# Patient Record
Sex: MALE | Race: BLACK OR AFRICAN AMERICAN | NOT HISPANIC OR LATINO | Employment: OTHER | RURAL
[De-identification: names, ages, dates, MRNs, and addresses within clinical notes are randomized per-mention and may not be internally consistent; named-entity substitution may affect disease eponyms.]

---

## 2020-07-01 ENCOUNTER — HISTORICAL (OUTPATIENT)
Dept: ADMINISTRATIVE | Facility: HOSPITAL | Age: 50
End: 2020-07-01

## 2021-01-18 ENCOUNTER — HISTORICAL (OUTPATIENT)
Dept: ADMINISTRATIVE | Facility: HOSPITAL | Age: 51
End: 2021-01-18

## 2021-03-11 RX ORDER — OLMESARTAN MEDOXOMIL 20 MG/1
20 TABLET ORAL DAILY
COMMUNITY
End: 2021-03-15 | Stop reason: ALTCHOICE

## 2021-03-15 ENCOUNTER — HOSPITAL ENCOUNTER (OUTPATIENT)
Facility: HOSPITAL | Age: 51
Discharge: HOME | End: 2021-03-15
Attending: SURGERY | Admitting: SURGERY
Payer: COMMERCIAL

## 2021-03-15 ENCOUNTER — ANESTHESIA EVENT (OUTPATIENT)
Dept: SURGERY | Facility: HOSPITAL | Age: 51
End: 2021-03-15
Payer: COMMERCIAL

## 2021-03-15 ENCOUNTER — ANESTHESIA (OUTPATIENT)
Dept: SURGERY | Facility: HOSPITAL | Age: 51
End: 2021-03-15
Payer: COMMERCIAL

## 2021-03-15 VITALS
RESPIRATION RATE: 16 BRPM | TEMPERATURE: 97 F | HEIGHT: 73 IN | BODY MASS INDEX: 27.57 KG/M2 | WEIGHT: 208 LBS | SYSTOLIC BLOOD PRESSURE: 147 MMHG | HEART RATE: 50 BPM | DIASTOLIC BLOOD PRESSURE: 88 MMHG | OXYGEN SATURATION: 96 %

## 2021-03-15 PROBLEM — R10.32 GROIN DISCOMFORT, LEFT: Status: ACTIVE | Noted: 2021-03-15

## 2021-03-15 PROBLEM — K40.20 NON-RECURRENT BILATERAL INGUINAL HERNIA WITHOUT OBSTRUCTION OR GANGRENE: Status: RESOLVED | Noted: 2021-03-15 | Resolved: 2021-03-15

## 2021-03-15 PROBLEM — K40.20 NON-RECURRENT BILATERAL INGUINAL HERNIA WITHOUT OBSTRUCTION OR GANGRENE: Status: ACTIVE | Noted: 2021-03-15

## 2021-03-15 PROCEDURE — 63600175 PHARM REV CODE 636 W HCPCS: Performed by: ANESTHESIOLOGY

## 2021-03-15 PROCEDURE — 25000003 PHARM REV CODE 250: Performed by: ANESTHESIOLOGY

## 2021-03-15 PROCEDURE — D9220A PRA ANESTHESIA: ICD-10-PCS | Mod: ,,, | Performed by: ANESTHESIOLOGY

## 2021-03-15 PROCEDURE — 63600175 PHARM REV CODE 636 W HCPCS: Performed by: SURGERY

## 2021-03-15 PROCEDURE — 36000711: Performed by: SURGERY

## 2021-03-15 PROCEDURE — C1781 MESH (IMPLANTABLE): HCPCS | Performed by: SURGERY

## 2021-03-15 PROCEDURE — 25000003 PHARM REV CODE 250: Performed by: SURGERY

## 2021-03-15 PROCEDURE — D9220A PRA ANESTHESIA: Mod: ,,, | Performed by: ANESTHESIOLOGY

## 2021-03-15 PROCEDURE — 49650 PR LAP,INGUINAL HERNIA REPR,INITIAL: ICD-10-PCS | Mod: 50,,, | Performed by: SURGERY

## 2021-03-15 PROCEDURE — 71000033 HC RECOVERY, INTIAL HOUR: Performed by: SURGERY

## 2021-03-15 PROCEDURE — 71000015 HC POSTOP RECOV 1ST HR: Performed by: SURGERY

## 2021-03-15 PROCEDURE — 37000009 HC ANESTHESIA EA ADD 15 MINS: Performed by: SURGERY

## 2021-03-15 PROCEDURE — 36000710: Performed by: SURGERY

## 2021-03-15 PROCEDURE — 49650 LAP ING HERNIA REPAIR INIT: CPT | Mod: 50,,, | Performed by: SURGERY

## 2021-03-15 PROCEDURE — 27201423 OPTIME MED/SURG SUP & DEVICES STERILE SUPPLY: Performed by: SURGERY

## 2021-03-15 PROCEDURE — 63600175 PHARM REV CODE 636 W HCPCS

## 2021-03-15 PROCEDURE — 71000016 HC POSTOP RECOV ADDL HR: Performed by: SURGERY

## 2021-03-15 PROCEDURE — 37000008 HC ANESTHESIA 1ST 15 MINUTES: Performed by: SURGERY

## 2021-03-15 DEVICE — PROGRIP LAPAROSCOPIC SELF FIXATING MESH 10 X15: Type: IMPLANTABLE DEVICE | Site: INGUINAL | Status: FUNCTIONAL

## 2021-03-15 RX ORDER — MEPERIDINE HYDROCHLORIDE 25 MG/ML
25 INJECTION INTRAMUSCULAR; INTRAVENOUS; SUBCUTANEOUS EVERY 10 MIN PRN
Status: DISCONTINUED | OUTPATIENT
Start: 2021-03-15 | End: 2021-03-15 | Stop reason: HOSPADM

## 2021-03-15 RX ORDER — MEPERIDINE HYDROCHLORIDE 25 MG/ML
INJECTION INTRAMUSCULAR; INTRAVENOUS; SUBCUTANEOUS
Status: DISCONTINUED
Start: 2021-03-15 | End: 2021-03-15 | Stop reason: HOSPADM

## 2021-03-15 RX ORDER — CEFAZOLIN SODIUM 2 G/50ML
2 SOLUTION INTRAVENOUS
Status: COMPLETED | OUTPATIENT
Start: 2021-03-15 | End: 2021-03-15

## 2021-03-15 RX ORDER — SODIUM CHLORIDE, SODIUM LACTATE, POTASSIUM CHLORIDE, CALCIUM CHLORIDE 600; 310; 30; 20 MG/100ML; MG/100ML; MG/100ML; MG/100ML
INJECTION, SOLUTION INTRAVENOUS
Status: DISCONTINUED
Start: 2021-03-15 | End: 2021-03-15 | Stop reason: HOSPADM

## 2021-03-15 RX ORDER — HYDROMORPHONE HYDROCHLORIDE 2 MG/ML
INJECTION, SOLUTION INTRAMUSCULAR; INTRAVENOUS; SUBCUTANEOUS
Status: DISCONTINUED
Start: 2021-03-15 | End: 2021-03-15 | Stop reason: HOSPADM

## 2021-03-15 RX ORDER — COLCHICINE 0.6 MG/1
0.6 TABLET ORAL
COMMUNITY
End: 2022-10-25 | Stop reason: SDUPTHER

## 2021-03-15 RX ORDER — HYDROMORPHONE HYDROCHLORIDE 2 MG/ML
0.5 INJECTION, SOLUTION INTRAMUSCULAR; INTRAVENOUS; SUBCUTANEOUS EVERY 5 MIN PRN
Status: DISCONTINUED | OUTPATIENT
Start: 2021-03-15 | End: 2021-03-15 | Stop reason: HOSPADM

## 2021-03-15 RX ORDER — ASPIRIN 81 MG/1
81 TABLET ORAL DAILY
COMMUNITY
End: 2022-02-03

## 2021-03-15 RX ORDER — HYDROCODONE BITARTRATE AND ACETAMINOPHEN 7.5; 325 MG/1; MG/1
1 TABLET ORAL EVERY 4 HOURS PRN
Qty: 20 TABLET | Refills: 0 | OUTPATIENT
Start: 2021-03-15

## 2021-03-15 RX ORDER — NEOSTIGMINE METHYLSULFATE 0.5 MG/ML
INJECTION, SOLUTION INTRAVENOUS
Status: DISCONTINUED | OUTPATIENT
Start: 2021-03-15 | End: 2021-03-15

## 2021-03-15 RX ORDER — MIDAZOLAM HYDROCHLORIDE 1 MG/ML
INJECTION, SOLUTION INTRAMUSCULAR; INTRAVENOUS
Status: DISCONTINUED | OUTPATIENT
Start: 2021-03-15 | End: 2021-03-15

## 2021-03-15 RX ORDER — SODIUM CHLORIDE, SODIUM LACTATE, POTASSIUM CHLORIDE, CALCIUM CHLORIDE 600; 310; 30; 20 MG/100ML; MG/100ML; MG/100ML; MG/100ML
INJECTION, SOLUTION INTRAVENOUS
Status: COMPLETED
Start: 2021-03-15 | End: 2021-03-15

## 2021-03-15 RX ORDER — FENTANYL CITRATE 50 UG/ML
INJECTION, SOLUTION INTRAMUSCULAR; INTRAVENOUS
Status: DISCONTINUED | OUTPATIENT
Start: 2021-03-15 | End: 2021-03-15

## 2021-03-15 RX ORDER — DEXAMETHASONE SODIUM PHOSPHATE 4 MG/ML
INJECTION, SOLUTION INTRA-ARTICULAR; INTRALESIONAL; INTRAMUSCULAR; INTRAVENOUS; SOFT TISSUE
Status: DISCONTINUED | OUTPATIENT
Start: 2021-03-15 | End: 2021-03-15

## 2021-03-15 RX ORDER — ALLOPURINOL 300 MG/1
300 TABLET ORAL
COMMUNITY
End: 2021-06-24

## 2021-03-15 RX ORDER — SODIUM CHLORIDE, SODIUM LACTATE, POTASSIUM CHLORIDE, CALCIUM CHLORIDE 600; 310; 30; 20 MG/100ML; MG/100ML; MG/100ML; MG/100ML
INJECTION, SOLUTION INTRAVENOUS CONTINUOUS
Status: DISCONTINUED | OUTPATIENT
Start: 2021-03-15 | End: 2021-03-15 | Stop reason: HOSPADM

## 2021-03-15 RX ORDER — ONDANSETRON 2 MG/ML
4 INJECTION INTRAMUSCULAR; INTRAVENOUS DAILY PRN
Status: DISCONTINUED | OUTPATIENT
Start: 2021-03-15 | End: 2021-03-15 | Stop reason: HOSPADM

## 2021-03-15 RX ORDER — LIDOCAINE HYDROCHLORIDE 10 MG/ML
1 INJECTION INFILTRATION; PERINEURAL ONCE
Status: DISCONTINUED | OUTPATIENT
Start: 2021-03-15 | End: 2021-03-15 | Stop reason: HOSPADM

## 2021-03-15 RX ORDER — LIDOCAINE HYDROCHLORIDE 20 MG/ML
INJECTION INTRAVENOUS
Status: DISCONTINUED | OUTPATIENT
Start: 2021-03-15 | End: 2021-03-15

## 2021-03-15 RX ORDER — ROCURONIUM BROMIDE 10 MG/ML
INJECTION, SOLUTION INTRAVENOUS
Status: DISCONTINUED | OUTPATIENT
Start: 2021-03-15 | End: 2021-03-15

## 2021-03-15 RX ORDER — BUPIVACAINE HYDROCHLORIDE 2.5 MG/ML
INJECTION, SOLUTION EPIDURAL; INFILTRATION; INTRACAUDAL
Status: DISCONTINUED | OUTPATIENT
Start: 2021-03-15 | End: 2021-03-15 | Stop reason: HOSPADM

## 2021-03-15 RX ORDER — NIFEDIPINE 60 MG/1
60 TABLET, EXTENDED RELEASE ORAL DAILY
COMMUNITY
End: 2022-02-03

## 2021-03-15 RX ORDER — PROPOFOL 10 MG/ML
VIAL (ML) INTRAVENOUS
Status: DISCONTINUED | OUTPATIENT
Start: 2021-03-15 | End: 2021-03-15

## 2021-03-15 RX ORDER — ONDANSETRON 2 MG/ML
INJECTION INTRAMUSCULAR; INTRAVENOUS
Status: DISCONTINUED | OUTPATIENT
Start: 2021-03-15 | End: 2021-03-15

## 2021-03-15 RX ORDER — DIPHENHYDRAMINE HYDROCHLORIDE 50 MG/ML
25 INJECTION INTRAMUSCULAR; INTRAVENOUS EVERY 6 HOURS PRN
Status: DISCONTINUED | OUTPATIENT
Start: 2021-03-15 | End: 2021-03-15 | Stop reason: HOSPADM

## 2021-03-15 RX ADMIN — CEFAZOLIN 2000 MG: 1 INJECTION, POWDER, FOR SOLUTION INTRAVENOUS at 10:03

## 2021-03-15 RX ADMIN — SODIUM CHLORIDE, POTASSIUM CHLORIDE, SODIUM LACTATE AND CALCIUM CHLORIDE: 600; 310; 30; 20 INJECTION, SOLUTION INTRAVENOUS at 12:03

## 2021-03-15 RX ADMIN — LIDOCAINE HYDROCHLORIDE 100 MG: 20 INJECTION, SOLUTION INTRAVENOUS at 10:03

## 2021-03-15 RX ADMIN — SODIUM CHLORIDE, POTASSIUM CHLORIDE, SODIUM LACTATE AND CALCIUM CHLORIDE: 600; 310; 30; 20 INJECTION, SOLUTION INTRAVENOUS at 10:03

## 2021-03-15 RX ADMIN — MIDAZOLAM HYDROCHLORIDE 2 MG: 1 INJECTION, SOLUTION INTRAMUSCULAR; INTRAVENOUS at 10:03

## 2021-03-15 RX ADMIN — HYDROMORPHONE HYDROCHLORIDE 0.5 MG: 2 INJECTION, SOLUTION INTRAMUSCULAR; INTRAVENOUS; SUBCUTANEOUS at 12:03

## 2021-03-15 RX ADMIN — DEXAMETHASONE SODIUM PHOSPHATE 4 MG: 4 INJECTION, SOLUTION INTRA-ARTICULAR; INTRALESIONAL; INTRAMUSCULAR; INTRAVENOUS; SOFT TISSUE at 10:03

## 2021-03-15 RX ADMIN — ONDANSETRON 4 MG: 2 INJECTION INTRAMUSCULAR; INTRAVENOUS at 10:03

## 2021-03-15 RX ADMIN — NEOSTIGMINE METHYLSULFATE 2 MG: 0.5 INJECTION, SOLUTION INTRAVENOUS at 11:03

## 2021-03-15 RX ADMIN — ROCURONIUM BROMIDE 35 MG: 10 INJECTION, SOLUTION INTRAVENOUS at 10:03

## 2021-03-15 RX ADMIN — FENTANYL CITRATE 100 MCG: 50 INJECTION, SOLUTION INTRAMUSCULAR; INTRAVENOUS at 10:03

## 2021-03-15 RX ADMIN — Medication 200 MG: at 10:03

## 2021-03-29 ENCOUNTER — OFFICE VISIT (OUTPATIENT)
Dept: SURGERY | Facility: CLINIC | Age: 51
End: 2021-03-29
Payer: COMMERCIAL

## 2021-03-29 DIAGNOSIS — K40.20 NON-RECURRENT BILATERAL INGUINAL HERNIA WITHOUT OBSTRUCTION OR GANGRENE: Primary | ICD-10-CM

## 2021-03-29 PROCEDURE — 99213 OFFICE O/P EST LOW 20 MIN: CPT | Mod: PBBFAC | Performed by: SURGERY

## 2021-03-29 PROCEDURE — 99024 POSTOP FOLLOW-UP VISIT: CPT | Mod: ,,, | Performed by: SURGERY

## 2021-03-29 PROCEDURE — 99999 PR PBB SHADOW E&M-EST. PATIENT-LVL III: ICD-10-PCS | Mod: PBBFAC,,, | Performed by: SURGERY

## 2021-03-29 PROCEDURE — 99024 PR POST-OP FOLLOW-UP VISIT: ICD-10-PCS | Mod: ,,, | Performed by: SURGERY

## 2021-03-29 PROCEDURE — 99999 PR PBB SHADOW E&M-EST. PATIENT-LVL III: CPT | Mod: PBBFAC,,, | Performed by: SURGERY

## 2021-04-20 ENCOUNTER — OFFICE VISIT (OUTPATIENT)
Dept: ORTHOPEDICS | Facility: CLINIC | Age: 51
End: 2021-04-20
Payer: COMMERCIAL

## 2021-04-20 DIAGNOSIS — M25.559 PAIN IN UNSPECIFIED HIP: ICD-10-CM

## 2021-04-20 DIAGNOSIS — M25.852 FEMOROACETABULAR IMPINGEMENT OF LEFT HIP: Primary | ICD-10-CM

## 2021-04-20 PROCEDURE — 99203 PR OFFICE/OUTPT VISIT, NEW, LEVL III, 30-44 MIN: ICD-10-PCS | Mod: ,,, | Performed by: ORTHOPAEDIC SURGERY

## 2021-04-20 PROCEDURE — 99203 OFFICE O/P NEW LOW 30 MIN: CPT | Mod: ,,, | Performed by: ORTHOPAEDIC SURGERY

## 2021-05-05 DIAGNOSIS — M25.552 LEFT HIP PAIN: Primary | ICD-10-CM

## 2021-05-07 ENCOUNTER — HISTORICAL (OUTPATIENT)
Dept: ADMINISTRATIVE | Facility: HOSPITAL | Age: 51
End: 2021-05-07

## 2021-05-17 DIAGNOSIS — M25.552 LEFT HIP PAIN: Primary | ICD-10-CM

## 2021-05-20 ENCOUNTER — TELEPHONE (OUTPATIENT)
Dept: FAMILY MEDICINE | Facility: CLINIC | Age: 51
End: 2021-05-20

## 2021-05-20 DIAGNOSIS — Z12.11 ENCOUNTER FOR SCREENING FOR MALIGNANT NEOPLASM OF COLON: Primary | ICD-10-CM

## 2021-05-20 RX ORDER — HYDROCODONE BITARTRATE AND ACETAMINOPHEN 7.5; 325 MG/1; MG/1
1 TABLET ORAL EVERY 4 HOURS PRN
COMMUNITY
Start: 2021-03-15 | End: 2021-10-01

## 2021-05-20 RX ORDER — AMLODIPINE BESYLATE 5 MG/1
5 TABLET ORAL DAILY
COMMUNITY
Start: 2020-12-31 | End: 2021-06-24

## 2021-05-20 RX ORDER — CETIRIZINE HYDROCHLORIDE 10 MG/1
10 TABLET ORAL DAILY
COMMUNITY
Start: 2021-05-08 | End: 2021-11-02 | Stop reason: SDUPTHER

## 2021-05-20 RX ORDER — ATENOLOL AND CHLORTHALIDONE TABLET 50; 25 MG/1; MG/1
1 TABLET ORAL 2 TIMES DAILY
COMMUNITY
Start: 2020-12-30 | End: 2022-02-03

## 2021-05-20 RX ORDER — DILTIAZEM HYDROCHLORIDE 240 MG/1
240 CAPSULE, EXTENDED RELEASE ORAL DAILY
COMMUNITY
Start: 2020-12-21

## 2021-05-20 RX ORDER — NIFEDIPINE 30 MG/1
30 TABLET, FILM COATED, EXTENDED RELEASE ORAL NIGHTLY
COMMUNITY
Start: 2021-02-04 | End: 2022-02-03 | Stop reason: DRUGHIGH

## 2021-05-21 ENCOUNTER — HOSPITAL ENCOUNTER (OUTPATIENT)
Dept: RADIOLOGY | Facility: HOSPITAL | Age: 51
Discharge: HOME | End: 2021-05-21
Attending: ORTHOPAEDIC SURGERY
Payer: COMMERCIAL

## 2021-05-21 DIAGNOSIS — M25.552 LEFT HIP PAIN: ICD-10-CM

## 2021-05-21 PROCEDURE — 63600175 PHARM REV CODE 636 W HCPCS

## 2021-05-21 PROCEDURE — 20610 DRAIN/INJ JOINT/BURSA W/O US: CPT | Mod: TC,LT

## 2021-05-21 PROCEDURE — 25000003 PHARM REV CODE 250: Performed by: ORTHOPAEDIC SURGERY

## 2021-05-21 PROCEDURE — 25500020 PHARM REV CODE 255

## 2021-05-21 RX ORDER — BUPIVACAINE HYDROCHLORIDE 2.5 MG/ML
5 INJECTION, SOLUTION EPIDURAL; INFILTRATION; INTRACAUDAL ONCE
Status: COMPLETED | OUTPATIENT
Start: 2021-05-21 | End: 2021-05-21

## 2021-05-21 RX ADMIN — IOPAMIDOL 4 ML: 612 INJECTION, SOLUTION INTRAVENOUS at 10:05

## 2021-05-21 RX ADMIN — BUPIVACAINE HYDROCHLORIDE 12.5 MG: 2.5 INJECTION, SOLUTION EPIDURAL; INFILTRATION; INTRACAUDAL; PERINEURAL at 11:05

## 2021-06-17 ENCOUNTER — OFFICE VISIT (OUTPATIENT)
Dept: ORTHOPEDICS | Facility: CLINIC | Age: 51
End: 2021-06-17
Payer: COMMERCIAL

## 2021-06-17 DIAGNOSIS — G57.12 MERALGIA PARAESTHETICA, LEFT: ICD-10-CM

## 2021-06-17 DIAGNOSIS — M25.852 FEMOROACETABULAR IMPINGEMENT OF LEFT HIP: Primary | ICD-10-CM

## 2021-06-17 PROCEDURE — 99213 OFFICE O/P EST LOW 20 MIN: CPT | Mod: ,,, | Performed by: ORTHOPAEDIC SURGERY

## 2021-06-17 PROCEDURE — 99213 PR OFFICE/OUTPT VISIT, EST, LEVL III, 20-29 MIN: ICD-10-PCS | Mod: ,,, | Performed by: ORTHOPAEDIC SURGERY

## 2021-06-17 RX ORDER — GABAPENTIN 100 MG/1
100 CAPSULE ORAL 3 TIMES DAILY
Qty: 90 CAPSULE | Refills: 11 | Status: SHIPPED | OUTPATIENT
Start: 2021-06-17 | End: 2023-07-06 | Stop reason: SDUPTHER

## 2021-06-22 ENCOUNTER — CLINICAL SUPPORT (OUTPATIENT)
Dept: REHABILITATION | Facility: HOSPITAL | Age: 51
End: 2021-06-22
Payer: COMMERCIAL

## 2021-06-22 DIAGNOSIS — M25.852 FEMOROACETABULAR IMPINGEMENT OF LEFT HIP: ICD-10-CM

## 2021-06-22 PROCEDURE — 97161 PT EVAL LOW COMPLEX 20 MIN: CPT | Mod: PN

## 2021-06-22 PROCEDURE — 97110 THERAPEUTIC EXERCISES: CPT | Mod: PN

## 2021-06-24 ENCOUNTER — CLINICAL SUPPORT (OUTPATIENT)
Dept: REHABILITATION | Facility: HOSPITAL | Age: 51
End: 2021-06-24
Payer: COMMERCIAL

## 2021-06-24 ENCOUNTER — ANESTHESIA EVENT (OUTPATIENT)
Dept: GASTROENTEROLOGY | Facility: HOSPITAL | Age: 51
End: 2021-06-24
Payer: COMMERCIAL

## 2021-06-24 ENCOUNTER — ANESTHESIA (OUTPATIENT)
Dept: GASTROENTEROLOGY | Facility: HOSPITAL | Age: 51
End: 2021-06-24
Payer: COMMERCIAL

## 2021-06-24 ENCOUNTER — HOSPITAL ENCOUNTER (OUTPATIENT)
Dept: GASTROENTEROLOGY | Facility: HOSPITAL | Age: 51
Discharge: HOME | End: 2021-06-24
Attending: STUDENT IN AN ORGANIZED HEALTH CARE EDUCATION/TRAINING PROGRAM
Payer: COMMERCIAL

## 2021-06-24 VITALS
RESPIRATION RATE: 12 BRPM | DIASTOLIC BLOOD PRESSURE: 76 MMHG | HEART RATE: 70 BPM | OXYGEN SATURATION: 98 % | TEMPERATURE: 97 F | BODY MASS INDEX: 27.57 KG/M2 | WEIGHT: 208 LBS | HEIGHT: 73 IN | SYSTOLIC BLOOD PRESSURE: 113 MMHG

## 2021-06-24 DIAGNOSIS — M25.852 HIP IMPINGEMENT SYNDROME, LEFT: Primary | ICD-10-CM

## 2021-06-24 DIAGNOSIS — Z12.11 SCREENING FOR MALIGNANT NEOPLASM OF COLON: ICD-10-CM

## 2021-06-24 DIAGNOSIS — Z12.11 SCREENING FOR MALIGNANT NEOPLASM OF COLON: Primary | ICD-10-CM

## 2021-06-24 DIAGNOSIS — Z12.11 ENCOUNTER FOR SCREENING FOR MALIGNANT NEOPLASM OF COLON: ICD-10-CM

## 2021-06-24 PROCEDURE — D9220A PRA ANESTHESIA: ICD-10-PCS | Mod: PT,,, | Performed by: NURSE ANESTHETIST, CERTIFIED REGISTERED

## 2021-06-24 PROCEDURE — 27000284 HC CANNULA NASAL: Performed by: NURSE ANESTHETIST, CERTIFIED REGISTERED

## 2021-06-24 PROCEDURE — 27000716 HC OXISENSOR PROBE, ANY SIZE: Performed by: NURSE ANESTHETIST, CERTIFIED REGISTERED

## 2021-06-24 PROCEDURE — 37000008 HC ANESTHESIA 1ST 15 MINUTES

## 2021-06-24 PROCEDURE — C1889 IMPLANT/INSERT DEVICE, NOC: HCPCS

## 2021-06-24 PROCEDURE — 25000003 PHARM REV CODE 250: Performed by: NURSE ANESTHETIST, CERTIFIED REGISTERED

## 2021-06-24 PROCEDURE — 97035 APP MDLTY 1+ULTRASOUND EA 15: CPT | Mod: PN,CQ

## 2021-06-24 PROCEDURE — 45385 COLONOSCOPY W/LESION REMOVAL: CPT | Mod: 33,,, | Performed by: STUDENT IN AN ORGANIZED HEALTH CARE EDUCATION/TRAINING PROGRAM

## 2021-06-24 PROCEDURE — 45380 COLONOSCOPY AND BIOPSY: CPT | Mod: 59,33,, | Performed by: STUDENT IN AN ORGANIZED HEALTH CARE EDUCATION/TRAINING PROGRAM

## 2021-06-24 PROCEDURE — 45380 PR COLONOSCOPY,BIOPSY: ICD-10-PCS | Mod: 59,33,, | Performed by: STUDENT IN AN ORGANIZED HEALTH CARE EDUCATION/TRAINING PROGRAM

## 2021-06-24 PROCEDURE — 45385 COLONOSCOPY W/LESION REMOVAL: CPT | Mod: 33

## 2021-06-24 PROCEDURE — 88305 TISSUE EXAM BY PATHOLOGIST: CPT | Mod: SUR | Performed by: STUDENT IN AN ORGANIZED HEALTH CARE EDUCATION/TRAINING PROGRAM

## 2021-06-24 PROCEDURE — 97110 THERAPEUTIC EXERCISES: CPT | Mod: PN,CQ

## 2021-06-24 PROCEDURE — 63600175 PHARM REV CODE 636 W HCPCS: Performed by: NURSE ANESTHETIST, CERTIFIED REGISTERED

## 2021-06-24 PROCEDURE — 88305 SURGICAL PATHOLOGY: ICD-10-PCS | Mod: 26,,, | Performed by: PATHOLOGY

## 2021-06-24 PROCEDURE — 45380 COLONOSCOPY AND BIOPSY: CPT

## 2021-06-24 PROCEDURE — 45385 PR COLONOSCOPY,REMV LESN,SNARE: ICD-10-PCS | Mod: 33,,, | Performed by: STUDENT IN AN ORGANIZED HEALTH CARE EDUCATION/TRAINING PROGRAM

## 2021-06-24 PROCEDURE — 37000009 HC ANESTHESIA EA ADD 15 MINS

## 2021-06-24 PROCEDURE — 27201423 OPTIME MED/SURG SUP & DEVICES STERILE SUPPLY

## 2021-06-24 PROCEDURE — 88305 TISSUE EXAM BY PATHOLOGIST: CPT | Mod: 26,,, | Performed by: PATHOLOGY

## 2021-06-24 PROCEDURE — D9220A PRA ANESTHESIA: Mod: PT,,, | Performed by: NURSE ANESTHETIST, CERTIFIED REGISTERED

## 2021-06-24 RX ORDER — LIDOCAINE HYDROCHLORIDE 20 MG/ML
INJECTION, SOLUTION EPIDURAL; INFILTRATION; INTRACAUDAL; PERINEURAL
Status: DISCONTINUED | OUTPATIENT
Start: 2021-06-24 | End: 2021-06-24

## 2021-06-24 RX ORDER — SODIUM CHLORIDE 0.9 % (FLUSH) 0.9 %
10 SYRINGE (ML) INJECTION
Status: DISCONTINUED | OUTPATIENT
Start: 2021-06-24 | End: 2021-06-25 | Stop reason: HOSPADM

## 2021-06-24 RX ORDER — PROPOFOL 10 MG/ML
VIAL (ML) INTRAVENOUS
Status: DISCONTINUED | OUTPATIENT
Start: 2021-06-24 | End: 2021-06-24

## 2021-06-24 RX ORDER — SODIUM CHLORIDE 9 MG/ML
INJECTION, SOLUTION INTRAVENOUS CONTINUOUS
Status: DISCONTINUED | OUTPATIENT
Start: 2021-06-24 | End: 2021-06-25 | Stop reason: HOSPADM

## 2021-06-24 RX ADMIN — SODIUM CHLORIDE: 9 INJECTION, SOLUTION INTRAVENOUS at 01:06

## 2021-06-24 RX ADMIN — PROPOFOL 100 MG: 10 INJECTION, EMULSION INTRAVENOUS at 01:06

## 2021-06-24 RX ADMIN — PROPOFOL 40 MG: 10 INJECTION, EMULSION INTRAVENOUS at 01:06

## 2021-06-24 RX ADMIN — PROPOFOL 50 MG: 10 INJECTION, EMULSION INTRAVENOUS at 01:06

## 2021-06-24 RX ADMIN — LIDOCAINE HYDROCHLORIDE 80 MG: 20 INJECTION, SOLUTION INTRAVENOUS at 01:06

## 2021-06-25 ENCOUNTER — CLINICAL SUPPORT (OUTPATIENT)
Dept: REHABILITATION | Facility: HOSPITAL | Age: 51
End: 2021-06-25
Payer: COMMERCIAL

## 2021-06-25 DIAGNOSIS — M25.852 HIP IMPINGEMENT SYNDROME, LEFT: Primary | ICD-10-CM

## 2021-06-25 LAB
ESTROGEN SERPL-MCNC: NORMAL PG/ML
LAB AP GROSS DESCRIPTION: NORMAL
LAB AP LABORATORY NOTES: NORMAL
T3RU NFR SERPL: NORMAL %

## 2021-06-25 PROCEDURE — 97110 THERAPEUTIC EXERCISES: CPT | Mod: PN,CQ

## 2021-06-25 PROCEDURE — 97035 APP MDLTY 1+ULTRASOUND EA 15: CPT | Mod: PN,CQ

## 2021-06-29 ENCOUNTER — CLINICAL SUPPORT (OUTPATIENT)
Dept: REHABILITATION | Facility: HOSPITAL | Age: 51
End: 2021-06-29
Payer: COMMERCIAL

## 2021-06-29 ENCOUNTER — TELEPHONE (OUTPATIENT)
Dept: ORTHOPEDICS | Facility: CLINIC | Age: 51
End: 2021-06-29

## 2021-06-29 DIAGNOSIS — M25.552 LEFT HIP PAIN: Primary | ICD-10-CM

## 2021-06-29 DIAGNOSIS — S73.192D TEAR OF LEFT ACETABULAR LABRUM, SUBSEQUENT ENCOUNTER: Primary | ICD-10-CM

## 2021-06-29 PROCEDURE — 97110 THERAPEUTIC EXERCISES: CPT | Mod: PN,CQ

## 2021-06-29 PROCEDURE — 97014 ELECTRIC STIMULATION THERAPY: CPT | Mod: PN,CQ

## 2021-06-29 RX ORDER — IBUPROFEN 800 MG/1
800 TABLET ORAL EVERY 6 HOURS PRN
Qty: 30 TABLET | Refills: 0 | Status: CANCELLED | OUTPATIENT
Start: 2021-06-29

## 2021-06-30 ENCOUNTER — CLINICAL SUPPORT (OUTPATIENT)
Dept: REHABILITATION | Facility: HOSPITAL | Age: 51
End: 2021-06-30
Payer: COMMERCIAL

## 2021-06-30 DIAGNOSIS — M25.652 DECREASED RANGE OF LEFT HIP MOVEMENT: Primary | ICD-10-CM

## 2021-06-30 PROCEDURE — 97110 THERAPEUTIC EXERCISES: CPT | Mod: PN,CQ

## 2021-06-30 PROCEDURE — 97014 ELECTRIC STIMULATION THERAPY: CPT | Mod: PN,CQ

## 2021-07-02 ENCOUNTER — CLINICAL SUPPORT (OUTPATIENT)
Dept: REHABILITATION | Facility: HOSPITAL | Age: 51
End: 2021-07-02
Payer: COMMERCIAL

## 2021-07-02 PROCEDURE — 97110 THERAPEUTIC EXERCISES: CPT | Mod: PN,CQ

## 2021-07-06 ENCOUNTER — CLINICAL SUPPORT (OUTPATIENT)
Dept: REHABILITATION | Facility: HOSPITAL | Age: 51
End: 2021-07-06
Payer: COMMERCIAL

## 2021-07-06 DIAGNOSIS — S73.192S TEAR OF LEFT ACETABULAR LABRUM, SEQUELA: Primary | ICD-10-CM

## 2021-07-06 PROCEDURE — 97110 THERAPEUTIC EXERCISES: CPT | Mod: PN,CQ

## 2021-07-08 ENCOUNTER — CLINICAL SUPPORT (OUTPATIENT)
Dept: REHABILITATION | Facility: HOSPITAL | Age: 51
End: 2021-07-08
Payer: COMMERCIAL

## 2021-07-08 PROCEDURE — 97110 THERAPEUTIC EXERCISES: CPT | Mod: PN,CQ

## 2021-07-09 ENCOUNTER — CLINICAL SUPPORT (OUTPATIENT)
Dept: REHABILITATION | Facility: HOSPITAL | Age: 51
End: 2021-07-09
Payer: COMMERCIAL

## 2021-07-09 DIAGNOSIS — M25.652 DECREASED RANGE OF LEFT HIP MOVEMENT: Primary | ICD-10-CM

## 2021-07-09 PROCEDURE — 97110 THERAPEUTIC EXERCISES: CPT | Mod: PN,CQ

## 2021-07-12 ENCOUNTER — CLINICAL SUPPORT (OUTPATIENT)
Dept: REHABILITATION | Facility: HOSPITAL | Age: 51
End: 2021-07-12
Payer: COMMERCIAL

## 2021-07-12 DIAGNOSIS — M25.652 DECREASED RANGE OF LEFT HIP MOVEMENT: Primary | ICD-10-CM

## 2021-07-12 PROCEDURE — 97110 THERAPEUTIC EXERCISES: CPT | Mod: PN,CQ

## 2021-07-22 ENCOUNTER — TELEPHONE (OUTPATIENT)
Dept: FAMILY MEDICINE | Facility: CLINIC | Age: 51
End: 2021-07-22

## 2021-07-29 ENCOUNTER — OFFICE VISIT (OUTPATIENT)
Dept: ORTHOPEDICS | Facility: CLINIC | Age: 51
End: 2021-07-29
Payer: COMMERCIAL

## 2021-07-29 DIAGNOSIS — Z01.818 PREPROCEDURAL EXAMINATION: ICD-10-CM

## 2021-07-29 DIAGNOSIS — Z01.812 PRE-PROCEDURAL LABORATORY EXAMINATION: ICD-10-CM

## 2021-07-29 DIAGNOSIS — M25.852 FEMOROACETABULAR IMPINGEMENT OF LEFT HIP: Primary | ICD-10-CM

## 2021-07-29 PROCEDURE — 99214 PR OFFICE/OUTPT VISIT, EST, LEVL IV, 30-39 MIN: ICD-10-PCS | Mod: ,,, | Performed by: ORTHOPAEDIC SURGERY

## 2021-07-29 PROCEDURE — 99214 OFFICE O/P EST MOD 30 MIN: CPT | Mod: ,,, | Performed by: ORTHOPAEDIC SURGERY

## 2021-07-29 RX ORDER — NAPROXEN 500 MG/1
500 TABLET ORAL 2 TIMES DAILY WITH MEALS
Qty: 60 TABLET | Refills: 3 | Status: SHIPPED | OUTPATIENT
Start: 2021-07-29 | End: 2022-02-03 | Stop reason: ALTCHOICE

## 2021-07-30 DIAGNOSIS — M25.852 FEMOROACETABULAR IMPINGEMENT OF LEFT HIP: Primary | ICD-10-CM

## 2021-07-30 RX ORDER — MUPIROCIN 20 MG/G
OINTMENT TOPICAL
Status: CANCELLED | OUTPATIENT
Start: 2021-07-30

## 2021-07-30 RX ORDER — SODIUM CHLORIDE 9 MG/ML
INJECTION, SOLUTION INTRAVENOUS CONTINUOUS
Status: CANCELLED | OUTPATIENT
Start: 2021-07-30

## 2021-08-09 ENCOUNTER — CLINICAL SUPPORT (OUTPATIENT)
Dept: CARDIOLOGY | Facility: CLINIC | Age: 51
End: 2021-08-09
Payer: COMMERCIAL

## 2021-08-09 DIAGNOSIS — Z01.812 PRE-PROCEDURAL LABORATORY EXAMINATION: ICD-10-CM

## 2021-08-09 PROCEDURE — 93010 ELECTROCARDIOGRAM REPORT: CPT | Mod: S$PBB,,, | Performed by: STUDENT IN AN ORGANIZED HEALTH CARE EDUCATION/TRAINING PROGRAM

## 2021-08-09 PROCEDURE — 93010 EKG 12-LEAD: ICD-10-PCS | Mod: S$PBB,,, | Performed by: STUDENT IN AN ORGANIZED HEALTH CARE EDUCATION/TRAINING PROGRAM

## 2021-08-09 PROCEDURE — 93005 ELECTROCARDIOGRAM TRACING: CPT | Mod: PBBFAC | Performed by: STUDENT IN AN ORGANIZED HEALTH CARE EDUCATION/TRAINING PROGRAM

## 2021-08-09 PROCEDURE — 99212 OFFICE O/P EST SF 10 MIN: CPT | Mod: PBBFAC

## 2021-08-24 ENCOUNTER — OFFICE VISIT (OUTPATIENT)
Dept: FAMILY MEDICINE | Facility: CLINIC | Age: 51
End: 2021-08-24
Payer: COMMERCIAL

## 2021-08-24 VITALS
TEMPERATURE: 97 F | OXYGEN SATURATION: 95 % | RESPIRATION RATE: 20 BRPM | DIASTOLIC BLOOD PRESSURE: 88 MMHG | SYSTOLIC BLOOD PRESSURE: 116 MMHG | HEART RATE: 68 BPM | WEIGHT: 201 LBS | HEIGHT: 73 IN | BODY MASS INDEX: 26.64 KG/M2

## 2021-08-24 DIAGNOSIS — H66.91 RIGHT OTITIS MEDIA, UNSPECIFIED OTITIS MEDIA TYPE: ICD-10-CM

## 2021-08-24 DIAGNOSIS — H60.501 ACUTE OTITIS EXTERNA OF RIGHT EAR, UNSPECIFIED TYPE: Primary | ICD-10-CM

## 2021-08-24 DIAGNOSIS — H93.8X1 CONGESTION OF RIGHT EAR: ICD-10-CM

## 2021-08-24 PROBLEM — M25.551 PAIN IN RIGHT HIP: Status: ACTIVE | Noted: 2020-08-12

## 2021-08-24 LAB
CTP QC/QA: YES
FLUAV AG NPH QL: NEGATIVE
FLUBV AG NPH QL: NEGATIVE
SARS-COV-2 AG RESP QL IA.RAPID: NEGATIVE

## 2021-08-24 PROCEDURE — 87428 SARSCOV & INF VIR A&B AG IA: CPT | Mod: QW,,, | Performed by: NURSE PRACTITIONER

## 2021-08-24 PROCEDURE — 99213 OFFICE O/P EST LOW 20 MIN: CPT | Mod: ,,, | Performed by: NURSE PRACTITIONER

## 2021-08-24 PROCEDURE — 99213 PR OFFICE/OUTPT VISIT, EST, LEVL III, 20-29 MIN: ICD-10-PCS | Mod: ,,, | Performed by: NURSE PRACTITIONER

## 2021-08-24 PROCEDURE — 87428 POCT SARS-COV2 (COVID) WITH FLU ANTIGEN: ICD-10-PCS | Mod: QW,,, | Performed by: NURSE PRACTITIONER

## 2021-08-24 RX ORDER — CARVEDILOL 12.5 MG/1
12.5 TABLET ORAL 2 TIMES DAILY WITH MEALS
COMMUNITY
End: 2022-02-03

## 2021-08-24 RX ORDER — CEFUROXIME AXETIL 250 MG/1
250 TABLET ORAL EVERY 12 HOURS
Qty: 20 TABLET | Refills: 0 | Status: SHIPPED | OUTPATIENT
Start: 2021-08-24 | End: 2021-11-04 | Stop reason: ALTCHOICE

## 2021-08-24 RX ORDER — NEOMYCIN SULFATE, POLYMYXIN B SULFATE AND HYDROCORTISONE 10; 3.5; 1 MG/ML; MG/ML; [USP'U]/ML
3 SUSPENSION/ DROPS AURICULAR (OTIC) 3 TIMES DAILY
Qty: 10 ML | Refills: 0 | Status: SHIPPED | OUTPATIENT
Start: 2021-08-24 | End: 2021-11-04 | Stop reason: ALTCHOICE

## 2021-09-09 DIAGNOSIS — M25.852 FEMOROACETABULAR IMPINGEMENT OF LEFT HIP: Primary | ICD-10-CM

## 2021-09-09 DIAGNOSIS — Z11.59 SPECIAL SCREENING EXAMINATION FOR UNSPECIFIED VIRAL DISEASE: ICD-10-CM

## 2021-09-09 DIAGNOSIS — S73.192A LABRAL TEAR OF LEFT HIP JOINT: ICD-10-CM

## 2021-09-09 RX ORDER — SODIUM CHLORIDE 9 MG/ML
INJECTION, SOLUTION INTRAVENOUS CONTINUOUS
Status: CANCELLED | OUTPATIENT
Start: 2021-09-09

## 2021-09-09 RX ORDER — MUPIROCIN 20 MG/G
OINTMENT TOPICAL
Status: CANCELLED | OUTPATIENT
Start: 2021-09-09

## 2021-09-16 RX ORDER — NIFEDIPINE 30 MG/1
30 TABLET, EXTENDED RELEASE ORAL NIGHTLY
COMMUNITY
Start: 2021-09-09 | End: 2022-02-03

## 2021-09-20 ENCOUNTER — ANESTHESIA (OUTPATIENT)
Dept: SURGERY | Facility: HOSPITAL | Age: 51
End: 2021-09-20
Payer: COMMERCIAL

## 2021-09-20 ENCOUNTER — ANESTHESIA EVENT (OUTPATIENT)
Dept: SURGERY | Facility: HOSPITAL | Age: 51
End: 2021-09-20
Payer: COMMERCIAL

## 2021-09-20 ENCOUNTER — HOSPITAL ENCOUNTER (OUTPATIENT)
Facility: HOSPITAL | Age: 51
Discharge: HOME | End: 2021-09-20
Attending: ORTHOPAEDIC SURGERY | Admitting: ORTHOPAEDIC SURGERY
Payer: COMMERCIAL

## 2021-09-20 VITALS
SYSTOLIC BLOOD PRESSURE: 160 MMHG | HEART RATE: 60 BPM | HEIGHT: 73 IN | DIASTOLIC BLOOD PRESSURE: 73 MMHG | BODY MASS INDEX: 28.58 KG/M2 | WEIGHT: 215.63 LBS | OXYGEN SATURATION: 99 % | RESPIRATION RATE: 12 BRPM | TEMPERATURE: 99 F

## 2021-09-20 DIAGNOSIS — S73.192A LABRAL TEAR OF LEFT HIP JOINT: Primary | ICD-10-CM

## 2021-09-20 DIAGNOSIS — M25.852 FEMOROACETABULAR IMPINGEMENT OF LEFT HIP: ICD-10-CM

## 2021-09-20 PROCEDURE — 37000008 HC ANESTHESIA 1ST 15 MINUTES: Performed by: ORTHOPAEDIC SURGERY

## 2021-09-20 PROCEDURE — 71000016 HC POSTOP RECOV ADDL HR: Performed by: ORTHOPAEDIC SURGERY

## 2021-09-20 PROCEDURE — 29916 PR ARTHROSCOPY HIP W/LABRAL REPAIR: ICD-10-PCS | Mod: LT,,, | Performed by: ORTHOPAEDIC SURGERY

## 2021-09-20 PROCEDURE — 27000510 HC BLANKET BAIR HUGGER ANY SIZE: Performed by: ANESTHESIOLOGY

## 2021-09-20 PROCEDURE — 27000260 *HC AIRWAY ORAL: Performed by: ANESTHESIOLOGY

## 2021-09-20 PROCEDURE — 37000009 HC ANESTHESIA EA ADD 15 MINS: Performed by: ORTHOPAEDIC SURGERY

## 2021-09-20 PROCEDURE — 27000655: Performed by: ANESTHESIOLOGY

## 2021-09-20 PROCEDURE — 25000003 PHARM REV CODE 250

## 2021-09-20 PROCEDURE — C1713 ANCHOR/SCREW BN/BN,TIS/BN: HCPCS | Performed by: ORTHOPAEDIC SURGERY

## 2021-09-20 PROCEDURE — 27000689 HC BLADE LARYNGOSCOPE ANY SIZE: Performed by: ANESTHESIOLOGY

## 2021-09-20 PROCEDURE — 27201423 OPTIME MED/SURG SUP & DEVICES STERILE SUPPLY: Performed by: ORTHOPAEDIC SURGERY

## 2021-09-20 PROCEDURE — D9220A PRA ANESTHESIA: Mod: ,,, | Performed by: ANESTHESIOLOGY

## 2021-09-20 PROCEDURE — 25000003 PHARM REV CODE 250: Performed by: ANESTHESIOLOGY

## 2021-09-20 PROCEDURE — 27100168 OPTIME MED/SURG SUP & DEVICES NON-STERILE SUPPLY: Performed by: ORTHOPAEDIC SURGERY

## 2021-09-20 PROCEDURE — 63600175 PHARM REV CODE 636 W HCPCS: Performed by: ANESTHESIOLOGY

## 2021-09-20 PROCEDURE — 63600175 PHARM REV CODE 636 W HCPCS

## 2021-09-20 PROCEDURE — 64447 NJX AA&/STRD FEMORAL NRV IMG: CPT | Mod: XU,LT,, | Performed by: ANESTHESIOLOGY

## 2021-09-20 PROCEDURE — 27000716 HC OXISENSOR PROBE, ANY SIZE: Performed by: ANESTHESIOLOGY

## 2021-09-20 PROCEDURE — 71000033 HC RECOVERY, INTIAL HOUR: Performed by: ORTHOPAEDIC SURGERY

## 2021-09-20 PROCEDURE — 71000015 HC POSTOP RECOV 1ST HR: Performed by: ORTHOPAEDIC SURGERY

## 2021-09-20 PROCEDURE — 29916 HIP ARTHRO W/LABRAL REPAIR: CPT | Mod: LT,,, | Performed by: ORTHOPAEDIC SURGERY

## 2021-09-20 PROCEDURE — 25000003 PHARM REV CODE 250: Performed by: NURSE ANESTHETIST, CERTIFIED REGISTERED

## 2021-09-20 PROCEDURE — 29914 HIP ARTHRO W/FEMOROPLASTY: CPT | Mod: LT,,, | Performed by: ORTHOPAEDIC SURGERY

## 2021-09-20 PROCEDURE — 63600175 PHARM REV CODE 636 W HCPCS: Performed by: NURSE ANESTHETIST, CERTIFIED REGISTERED

## 2021-09-20 PROCEDURE — 97161 PT EVAL LOW COMPLEX 20 MIN: CPT

## 2021-09-20 PROCEDURE — 29914 PR ARTHROSCOPY HIP W/FEMOROPLASTY: ICD-10-PCS | Mod: LT,,, | Performed by: ORTHOPAEDIC SURGERY

## 2021-09-20 PROCEDURE — D9220A PRA ANESTHESIA: ICD-10-PCS | Mod: ,,, | Performed by: ANESTHESIOLOGY

## 2021-09-20 PROCEDURE — 64447 PERIPHERAL BLOCK: ICD-10-PCS | Mod: XU,LT,, | Performed by: ANESTHESIOLOGY

## 2021-09-20 PROCEDURE — 36000710: Performed by: ORTHOPAEDIC SURGERY

## 2021-09-20 PROCEDURE — 36000711: Performed by: ORTHOPAEDIC SURGERY

## 2021-09-20 PROCEDURE — 25000003 PHARM REV CODE 250: Performed by: ORTHOPAEDIC SURGERY

## 2021-09-20 PROCEDURE — 27000165 HC TUBE, ETT CUFFED: Performed by: ANESTHESIOLOGY

## 2021-09-20 DEVICE — IMPLANTABLE DEVICE: Type: IMPLANTABLE DEVICE | Site: HIP | Status: FUNCTIONAL

## 2021-09-20 RX ORDER — ONDANSETRON 2 MG/ML
4 INJECTION INTRAMUSCULAR; INTRAVENOUS DAILY PRN
Status: DISCONTINUED | OUTPATIENT
Start: 2021-09-20 | End: 2021-09-20 | Stop reason: HOSPADM

## 2021-09-20 RX ORDER — FENTANYL CITRATE 50 UG/ML
INJECTION, SOLUTION INTRAMUSCULAR; INTRAVENOUS
Status: DISCONTINUED | OUTPATIENT
Start: 2021-09-20 | End: 2021-09-20

## 2021-09-20 RX ORDER — MORPHINE SULFATE 10 MG/ML
4 INJECTION INTRAMUSCULAR; INTRAVENOUS; SUBCUTANEOUS EVERY 5 MIN PRN
Status: DISCONTINUED | OUTPATIENT
Start: 2021-09-20 | End: 2021-09-20 | Stop reason: HOSPADM

## 2021-09-20 RX ORDER — OXYCODONE AND ACETAMINOPHEN 10; 325 MG/1; MG/1
1 TABLET ORAL EVERY 6 HOURS PRN
Qty: 30 TABLET | Refills: 0 | Status: SHIPPED | OUTPATIENT
Start: 2021-09-20 | End: 2021-10-01

## 2021-09-20 RX ORDER — OXYCODONE HYDROCHLORIDE 5 MG/1
5 TABLET ORAL
Status: DISCONTINUED | OUTPATIENT
Start: 2021-09-20 | End: 2021-09-20 | Stop reason: HOSPADM

## 2021-09-20 RX ORDER — OXYCODONE AND ACETAMINOPHEN 5; 325 MG/1; MG/1
1 TABLET ORAL EVERY 4 HOURS PRN
Status: DISCONTINUED | OUTPATIENT
Start: 2021-09-20 | End: 2021-09-20 | Stop reason: HOSPADM

## 2021-09-20 RX ORDER — ONDANSETRON 4 MG/1
4 TABLET, ORALLY DISINTEGRATING ORAL EVERY 6 HOURS PRN
Qty: 30 TABLET | Refills: 0 | Status: SHIPPED | OUTPATIENT
Start: 2021-09-20 | End: 2021-11-04 | Stop reason: ALTCHOICE

## 2021-09-20 RX ORDER — HYDROCODONE BITARTRATE AND ACETAMINOPHEN 7.5; 325 MG/1; MG/1
1 TABLET ORAL EVERY 6 HOURS PRN
Status: DISCONTINUED | OUTPATIENT
Start: 2021-09-20 | End: 2021-09-20 | Stop reason: HOSPADM

## 2021-09-20 RX ORDER — LIDOCAINE HYDROCHLORIDE 20 MG/ML
INJECTION, SOLUTION EPIDURAL; INFILTRATION; INTRACAUDAL; PERINEURAL
Status: DISCONTINUED | OUTPATIENT
Start: 2021-09-20 | End: 2021-09-20

## 2021-09-20 RX ORDER — EPHEDRINE SULFATE 50 MG/ML
INJECTION, SOLUTION INTRAVENOUS
Status: DISCONTINUED | OUTPATIENT
Start: 2021-09-20 | End: 2021-09-20

## 2021-09-20 RX ORDER — DEXAMETHASONE SODIUM PHOSPHATE 4 MG/ML
INJECTION, SOLUTION INTRA-ARTICULAR; INTRALESIONAL; INTRAMUSCULAR; INTRAVENOUS; SOFT TISSUE
Status: DISCONTINUED | OUTPATIENT
Start: 2021-09-20 | End: 2021-09-20

## 2021-09-20 RX ORDER — OXYCODONE HYDROCHLORIDE 5 MG/1
10 TABLET ORAL EVERY 4 HOURS PRN
Status: DISCONTINUED | OUTPATIENT
Start: 2021-09-20 | End: 2021-09-20 | Stop reason: HOSPADM

## 2021-09-20 RX ORDER — HYDROMORPHONE HYDROCHLORIDE 2 MG/ML
0.5 INJECTION, SOLUTION INTRAMUSCULAR; INTRAVENOUS; SUBCUTANEOUS EVERY 5 MIN PRN
Status: DISCONTINUED | OUTPATIENT
Start: 2021-09-20 | End: 2021-09-20 | Stop reason: HOSPADM

## 2021-09-20 RX ORDER — BUPIVACAINE HYDROCHLORIDE 2.5 MG/ML
INJECTION, SOLUTION EPIDURAL; INFILTRATION; INTRACAUDAL
Status: DISCONTINUED | OUTPATIENT
Start: 2021-09-20 | End: 2021-09-20 | Stop reason: HOSPADM

## 2021-09-20 RX ORDER — ONDANSETRON 4 MG/1
8 TABLET, ORALLY DISINTEGRATING ORAL EVERY 8 HOURS PRN
Status: DISCONTINUED | OUTPATIENT
Start: 2021-09-20 | End: 2021-09-20 | Stop reason: HOSPADM

## 2021-09-20 RX ORDER — ONDANSETRON 2 MG/ML
INJECTION INTRAMUSCULAR; INTRAVENOUS
Status: DISCONTINUED | OUTPATIENT
Start: 2021-09-20 | End: 2021-09-20

## 2021-09-20 RX ORDER — PROPOFOL 10 MG/ML
VIAL (ML) INTRAVENOUS
Status: DISCONTINUED | OUTPATIENT
Start: 2021-09-20 | End: 2021-09-20

## 2021-09-20 RX ORDER — CEFAZOLIN SODIUM 1 G/3ML
INJECTION, POWDER, FOR SOLUTION INTRAMUSCULAR; INTRAVENOUS
Status: DISCONTINUED | OUTPATIENT
Start: 2021-09-20 | End: 2021-09-20

## 2021-09-20 RX ORDER — SODIUM CHLORIDE 9 MG/ML
INJECTION, SOLUTION INTRAVENOUS CONTINUOUS
Status: DISCONTINUED | OUTPATIENT
Start: 2021-09-20 | End: 2021-09-20 | Stop reason: HOSPADM

## 2021-09-20 RX ORDER — MUPIROCIN 20 MG/G
OINTMENT TOPICAL
Status: DISCONTINUED | OUTPATIENT
Start: 2021-09-20 | End: 2021-09-20 | Stop reason: HOSPADM

## 2021-09-20 RX ORDER — MEPERIDINE HYDROCHLORIDE 25 MG/ML
25 INJECTION INTRAMUSCULAR; INTRAVENOUS; SUBCUTANEOUS EVERY 10 MIN PRN
Status: DISCONTINUED | OUTPATIENT
Start: 2021-09-20 | End: 2021-09-20 | Stop reason: HOSPADM

## 2021-09-20 RX ORDER — CEFAZOLIN SODIUM 2 G/50ML
2 SOLUTION INTRAVENOUS
Status: DISCONTINUED | OUTPATIENT
Start: 2021-09-20 | End: 2021-09-20 | Stop reason: HOSPADM

## 2021-09-20 RX ORDER — MIDAZOLAM HYDROCHLORIDE 1 MG/ML
INJECTION INTRAMUSCULAR; INTRAVENOUS
Status: DISCONTINUED | OUTPATIENT
Start: 2021-09-20 | End: 2021-09-20

## 2021-09-20 RX ORDER — DIPHENHYDRAMINE HYDROCHLORIDE 50 MG/ML
25 INJECTION INTRAMUSCULAR; INTRAVENOUS EVERY 6 HOURS PRN
Status: DISCONTINUED | OUTPATIENT
Start: 2021-09-20 | End: 2021-09-20 | Stop reason: HOSPADM

## 2021-09-20 RX ORDER — ROCURONIUM BROMIDE 10 MG/ML
INJECTION, SOLUTION INTRAVENOUS
Status: DISCONTINUED | OUTPATIENT
Start: 2021-09-20 | End: 2021-09-20

## 2021-09-20 RX ADMIN — SUGAMMADEX 200 MG: 100 INJECTION, SOLUTION INTRAVENOUS at 06:09

## 2021-09-20 RX ADMIN — FENTANYL CITRATE 50 MCG: 50 INJECTION INTRAMUSCULAR; INTRAVENOUS at 06:09

## 2021-09-20 RX ADMIN — HYDROCODONE BITARTRATE AND ACETAMINOPHEN 1 TABLET: 7.5; 325 TABLET ORAL at 12:09

## 2021-09-20 RX ADMIN — LIDOCAINE HYDROCHLORIDE 100 MG: 20 INJECTION, SOLUTION INTRAVENOUS at 02:09

## 2021-09-20 RX ADMIN — MORPHINE SULFATE 4 MG: 10 INJECTION INTRAVENOUS at 07:09

## 2021-09-20 RX ADMIN — ROCURONIUM BROMIDE 50 MG: 10 INJECTION INTRAVENOUS at 02:09

## 2021-09-20 RX ADMIN — ROPIVACAINE HYDROCHLORIDE 20 ML: 7.5 INJECTION, SOLUTION EPIDURAL; PERINEURAL at 02:09

## 2021-09-20 RX ADMIN — FENTANYL CITRATE 100 MCG: 50 INJECTION INTRAMUSCULAR; INTRAVENOUS at 02:09

## 2021-09-20 RX ADMIN — ONDANSETRON 4 MG: 2 INJECTION INTRAMUSCULAR; INTRAVENOUS at 07:09

## 2021-09-20 RX ADMIN — PROPOFOL 100 MG: 10 INJECTION, EMULSION INTRAVENOUS at 02:09

## 2021-09-20 RX ADMIN — CEFAZOLIN 2 G: 1 INJECTION, POWDER, FOR SOLUTION INTRAMUSCULAR; INTRAVENOUS; PARENTERAL at 06:09

## 2021-09-20 RX ADMIN — SODIUM CHLORIDE: 9 INJECTION, SOLUTION INTRAVENOUS at 02:09

## 2021-09-20 RX ADMIN — SODIUM CHLORIDE: 9 INJECTION, SOLUTION INTRAVENOUS at 12:09

## 2021-09-20 RX ADMIN — DEXAMETHASONE SODIUM PHOSPHATE 4 MG: 4 INJECTION, SOLUTION INTRA-ARTICULAR; INTRALESIONAL; INTRAMUSCULAR; INTRAVENOUS; SOFT TISSUE at 06:09

## 2021-09-20 RX ADMIN — ROCURONIUM BROMIDE 20 MG: 10 INJECTION INTRAVENOUS at 03:09

## 2021-09-20 RX ADMIN — EPHEDRINE SULFATE 10 MG: 50 INJECTION INTRAVENOUS at 03:09

## 2021-09-20 RX ADMIN — FENTANYL CITRATE 100 MCG: 50 INJECTION INTRAMUSCULAR; INTRAVENOUS at 04:09

## 2021-09-20 RX ADMIN — MIDAZOLAM 2 MG: 1 INJECTION INTRAMUSCULAR; INTRAVENOUS at 02:09

## 2021-09-20 RX ADMIN — PROPOFOL 200 MG: 10 INJECTION, EMULSION INTRAVENOUS at 02:09

## 2021-09-20 RX ADMIN — CEFAZOLIN 2 G: 1 INJECTION, POWDER, FOR SOLUTION INTRAMUSCULAR; INTRAVENOUS; PARENTERAL at 02:09

## 2021-09-20 RX ADMIN — EPHEDRINE SULFATE 10 MG: 50 INJECTION INTRAVENOUS at 05:09

## 2021-09-20 RX ADMIN — ONDANSETRON 4 MG: 2 INJECTION INTRAMUSCULAR; INTRAVENOUS at 06:09

## 2021-09-20 RX ADMIN — ROCURONIUM BROMIDE 10 MG: 10 INJECTION INTRAVENOUS at 04:09

## 2021-09-21 RX ORDER — ROPIVACAINE HYDROCHLORIDE 7.5 MG/ML
INJECTION, SOLUTION EPIDURAL; PERINEURAL
Status: DISCONTINUED | OUTPATIENT
Start: 2021-09-20 | End: 2021-09-21

## 2021-09-23 ENCOUNTER — CLINICAL SUPPORT (OUTPATIENT)
Dept: REHABILITATION | Facility: HOSPITAL | Age: 51
End: 2021-09-23
Payer: COMMERCIAL

## 2021-09-23 DIAGNOSIS — S73.192A TEAR OF LEFT ACETABULAR LABRUM, INITIAL ENCOUNTER: ICD-10-CM

## 2021-09-23 DIAGNOSIS — M25.852 FEMOROACETABULAR IMPINGEMENT OF LEFT HIP: Primary | ICD-10-CM

## 2021-09-23 DIAGNOSIS — S73.192A LABRAL TEAR OF LEFT HIP JOINT: ICD-10-CM

## 2021-09-23 PROCEDURE — 97110 THERAPEUTIC EXERCISES: CPT | Mod: PN

## 2021-09-23 PROCEDURE — 97161 PT EVAL LOW COMPLEX 20 MIN: CPT | Mod: PN

## 2021-09-28 ENCOUNTER — CLINICAL SUPPORT (OUTPATIENT)
Dept: REHABILITATION | Facility: HOSPITAL | Age: 51
End: 2021-09-28
Payer: COMMERCIAL

## 2021-09-28 DIAGNOSIS — S73.192S TEAR OF LEFT ACETABULAR LABRUM, SEQUELA: Primary | ICD-10-CM

## 2021-09-28 PROCEDURE — 97110 THERAPEUTIC EXERCISES: CPT | Mod: PN,CQ

## 2021-09-30 ENCOUNTER — CLINICAL SUPPORT (OUTPATIENT)
Dept: REHABILITATION | Facility: HOSPITAL | Age: 51
End: 2021-09-30
Payer: COMMERCIAL

## 2021-09-30 DIAGNOSIS — S73.192S TEAR OF LEFT ACETABULAR LABRUM, SEQUELA: Primary | ICD-10-CM

## 2021-09-30 PROCEDURE — 97110 THERAPEUTIC EXERCISES: CPT | Mod: PN,CQ

## 2021-10-01 ENCOUNTER — OFFICE VISIT (OUTPATIENT)
Dept: ORTHOPEDICS | Facility: CLINIC | Age: 51
End: 2021-10-01
Payer: COMMERCIAL

## 2021-10-01 VITALS — WEIGHT: 206 LBS | BODY MASS INDEX: 27.3 KG/M2 | HEIGHT: 73 IN

## 2021-10-01 DIAGNOSIS — S73.192S TEAR OF LEFT ACETABULAR LABRUM, SEQUELA: Primary | ICD-10-CM

## 2021-10-01 PROCEDURE — 99024 POSTOP FOLLOW-UP VISIT: CPT | Mod: ,,, | Performed by: NURSE PRACTITIONER

## 2021-10-01 PROCEDURE — 99024 PR POST-OP FOLLOW-UP VISIT: ICD-10-PCS | Mod: ,,, | Performed by: NURSE PRACTITIONER

## 2021-10-01 RX ORDER — TRAMADOL HYDROCHLORIDE 50 MG/1
50 TABLET ORAL EVERY 6 HOURS PRN
Qty: 30 TABLET | Refills: 0 | Status: SHIPPED | OUTPATIENT
Start: 2021-10-01 | End: 2023-11-22 | Stop reason: SDUPTHER

## 2021-10-05 ENCOUNTER — CLINICAL SUPPORT (OUTPATIENT)
Dept: REHABILITATION | Facility: HOSPITAL | Age: 51
End: 2021-10-05
Payer: COMMERCIAL

## 2021-10-05 DIAGNOSIS — S73.192S TEAR OF LEFT ACETABULAR LABRUM, SEQUELA: Primary | ICD-10-CM

## 2021-10-05 PROCEDURE — 97110 THERAPEUTIC EXERCISES: CPT | Mod: PN,CQ

## 2021-10-08 ENCOUNTER — CLINICAL SUPPORT (OUTPATIENT)
Dept: REHABILITATION | Facility: HOSPITAL | Age: 51
End: 2021-10-08
Payer: COMMERCIAL

## 2021-10-08 DIAGNOSIS — S73.192S TEAR OF LEFT ACETABULAR LABRUM, SEQUELA: Primary | ICD-10-CM

## 2021-10-08 PROCEDURE — 97110 THERAPEUTIC EXERCISES: CPT | Mod: PN,CQ

## 2021-10-12 ENCOUNTER — CLINICAL SUPPORT (OUTPATIENT)
Dept: REHABILITATION | Facility: HOSPITAL | Age: 51
End: 2021-10-12
Payer: COMMERCIAL

## 2021-10-12 DIAGNOSIS — M25.652 DECREASED RANGE OF LEFT HIP MOVEMENT: Primary | ICD-10-CM

## 2021-10-12 PROCEDURE — 97116 GAIT TRAINING THERAPY: CPT | Mod: PN,CQ

## 2021-10-12 PROCEDURE — 97110 THERAPEUTIC EXERCISES: CPT | Mod: PN,CQ

## 2021-10-14 ENCOUNTER — CLINICAL SUPPORT (OUTPATIENT)
Dept: REHABILITATION | Facility: HOSPITAL | Age: 51
End: 2021-10-14
Payer: COMMERCIAL

## 2021-10-14 DIAGNOSIS — S73.192S TEAR OF LEFT ACETABULAR LABRUM, SEQUELA: Primary | ICD-10-CM

## 2021-10-14 DIAGNOSIS — M25.551 PAIN IN RIGHT HIP: ICD-10-CM

## 2021-10-14 PROCEDURE — 97110 THERAPEUTIC EXERCISES: CPT | Mod: PN

## 2021-10-19 ENCOUNTER — CLINICAL SUPPORT (OUTPATIENT)
Dept: REHABILITATION | Facility: HOSPITAL | Age: 51
End: 2021-10-19
Payer: COMMERCIAL

## 2021-10-19 DIAGNOSIS — S73.192S TEAR OF LEFT ACETABULAR LABRUM, SEQUELA: Primary | ICD-10-CM

## 2021-10-19 PROCEDURE — 97110 THERAPEUTIC EXERCISES: CPT | Mod: PN,CQ

## 2021-10-21 ENCOUNTER — CLINICAL SUPPORT (OUTPATIENT)
Dept: REHABILITATION | Facility: HOSPITAL | Age: 51
End: 2021-10-21
Payer: COMMERCIAL

## 2021-10-21 DIAGNOSIS — M25.551 PAIN IN RIGHT HIP: ICD-10-CM

## 2021-10-21 DIAGNOSIS — M25.852 FEMOROACETABULAR IMPINGEMENT OF LEFT HIP: ICD-10-CM

## 2021-10-21 DIAGNOSIS — S73.192S TEAR OF LEFT ACETABULAR LABRUM, SEQUELA: Primary | ICD-10-CM

## 2021-10-21 PROCEDURE — 97110 THERAPEUTIC EXERCISES: CPT | Mod: PN

## 2021-10-26 ENCOUNTER — CLINICAL SUPPORT (OUTPATIENT)
Dept: REHABILITATION | Facility: HOSPITAL | Age: 51
End: 2021-10-26
Payer: COMMERCIAL

## 2021-10-26 DIAGNOSIS — M25.652 DECREASED RANGE OF LEFT HIP MOVEMENT: Primary | ICD-10-CM

## 2021-10-26 PROCEDURE — 97110 THERAPEUTIC EXERCISES: CPT | Mod: PN,CQ

## 2021-10-28 ENCOUNTER — CLINICAL SUPPORT (OUTPATIENT)
Dept: REHABILITATION | Facility: HOSPITAL | Age: 51
End: 2021-10-28
Payer: COMMERCIAL

## 2021-10-28 DIAGNOSIS — M25.652 DECREASED RANGE OF LEFT HIP MOVEMENT: Primary | ICD-10-CM

## 2021-10-28 PROCEDURE — 97110 THERAPEUTIC EXERCISES: CPT | Mod: PN,CQ

## 2021-11-02 ENCOUNTER — CLINICAL SUPPORT (OUTPATIENT)
Dept: REHABILITATION | Facility: HOSPITAL | Age: 51
End: 2021-11-02
Payer: COMMERCIAL

## 2021-11-02 ENCOUNTER — HOSPITAL ENCOUNTER (OUTPATIENT)
Dept: RADIOLOGY | Facility: HOSPITAL | Age: 51
Discharge: HOME | End: 2021-11-02
Attending: ORTHOPAEDIC SURGERY
Payer: COMMERCIAL

## 2021-11-02 ENCOUNTER — OFFICE VISIT (OUTPATIENT)
Dept: ORTHOPEDICS | Facility: CLINIC | Age: 51
End: 2021-11-02
Payer: COMMERCIAL

## 2021-11-02 DIAGNOSIS — M25.552 BILATERAL HIP PAIN: Primary | ICD-10-CM

## 2021-11-02 DIAGNOSIS — M25.652 DECREASED RANGE OF LEFT HIP MOVEMENT: Primary | ICD-10-CM

## 2021-11-02 DIAGNOSIS — M25.552 BILATERAL HIP PAIN: ICD-10-CM

## 2021-11-02 DIAGNOSIS — M25.551 BILATERAL HIP PAIN: Primary | ICD-10-CM

## 2021-11-02 DIAGNOSIS — M25.551 BILATERAL HIP PAIN: ICD-10-CM

## 2021-11-02 DIAGNOSIS — Z98.890 S/P HIP ARTHROSCOPY: ICD-10-CM

## 2021-11-02 PROCEDURE — 97110 THERAPEUTIC EXERCISES: CPT | Mod: PN,CQ,GP

## 2021-11-02 PROCEDURE — 73523 X-RAY EXAM HIPS BI 5/> VIEWS: CPT | Mod: 26,,, | Performed by: ORTHOPAEDIC SURGERY

## 2021-11-02 PROCEDURE — 73523 X-RAY EXAM HIPS BI 5/> VIEWS: CPT | Mod: TC

## 2021-11-02 PROCEDURE — 73523 XR HIP 5 OR MORE VIEWS BILATERAL: ICD-10-PCS | Mod: 26,,, | Performed by: ORTHOPAEDIC SURGERY

## 2021-11-02 PROCEDURE — 99024 POSTOP FOLLOW-UP VISIT: CPT | Mod: ,,, | Performed by: ORTHOPAEDIC SURGERY

## 2021-11-02 PROCEDURE — 99024 PR POST-OP FOLLOW-UP VISIT: ICD-10-PCS | Mod: ,,, | Performed by: ORTHOPAEDIC SURGERY

## 2021-11-02 RX ORDER — CETIRIZINE HYDROCHLORIDE 10 MG/1
10 TABLET ORAL DAILY
Qty: 30 TABLET | Refills: 5 | Status: SHIPPED | OUTPATIENT
Start: 2021-11-02 | End: 2023-07-06 | Stop reason: SDUPTHER

## 2021-11-04 ENCOUNTER — OFFICE VISIT (OUTPATIENT)
Dept: FAMILY MEDICINE | Facility: CLINIC | Age: 51
End: 2021-11-04
Payer: COMMERCIAL

## 2021-11-04 VITALS
DIASTOLIC BLOOD PRESSURE: 98 MMHG | RESPIRATION RATE: 20 BRPM | HEART RATE: 57 BPM | WEIGHT: 206 LBS | BODY MASS INDEX: 27.3 KG/M2 | SYSTOLIC BLOOD PRESSURE: 158 MMHG | OXYGEN SATURATION: 97 % | HEIGHT: 73 IN | TEMPERATURE: 97 F

## 2021-11-04 DIAGNOSIS — J32.4 CHRONIC PANSINUSITIS: ICD-10-CM

## 2021-11-04 DIAGNOSIS — R09.81 NASAL CONGESTION: Primary | ICD-10-CM

## 2021-11-04 DIAGNOSIS — I10 HYPERTENSION, UNSPECIFIED TYPE: ICD-10-CM

## 2021-11-04 LAB
CTP QC/QA: YES
CTP QC/QA: YES
FLUAV AG NPH QL: NEGATIVE
FLUBV AG NPH QL: NEGATIVE
S PYO RRNA THROAT QL PROBE: NEGATIVE
SARS-COV-2 AG RESP QL IA.RAPID: NEGATIVE

## 2021-11-04 PROCEDURE — 87428 SARSCOV & INF VIR A&B AG IA: CPT | Mod: QW,,, | Performed by: FAMILY MEDICINE

## 2021-11-04 PROCEDURE — 87880 STREP A ASSAY W/OPTIC: CPT | Mod: QW,,, | Performed by: FAMILY MEDICINE

## 2021-11-04 PROCEDURE — 87428 POCT SARS-COV2 (COVID) WITH FLU ANTIGEN: ICD-10-PCS | Mod: QW,,, | Performed by: FAMILY MEDICINE

## 2021-11-04 PROCEDURE — 99214 OFFICE O/P EST MOD 30 MIN: CPT | Mod: 25,,, | Performed by: FAMILY MEDICINE

## 2021-11-04 PROCEDURE — 96372 PR INJECTION,THERAP/PROPH/DIAG2ST, IM OR SUBCUT: ICD-10-PCS | Mod: ,,, | Performed by: FAMILY MEDICINE

## 2021-11-04 PROCEDURE — 96372 THER/PROPH/DIAG INJ SC/IM: CPT | Mod: ,,, | Performed by: FAMILY MEDICINE

## 2021-11-04 PROCEDURE — 87880 POCT RAPID STREP A: ICD-10-PCS | Mod: QW,,, | Performed by: FAMILY MEDICINE

## 2021-11-04 PROCEDURE — 99214 PR OFFICE/OUTPT VISIT, EST, LEVL IV, 30-39 MIN: ICD-10-PCS | Mod: 25,,, | Performed by: FAMILY MEDICINE

## 2021-11-04 RX ORDER — METHYLPREDNISOLONE ACETATE 40 MG/ML
40 INJECTION, SUSPENSION INTRA-ARTICULAR; INTRALESIONAL; INTRAMUSCULAR; SOFT TISSUE
Status: COMPLETED | OUTPATIENT
Start: 2021-11-04 | End: 2021-11-04

## 2021-11-04 RX ORDER — IPRATROPIUM BROMIDE 42 UG/1
2 SPRAY, METERED NASAL 4 TIMES DAILY
Qty: 15 ML | Refills: 5 | Status: SHIPPED | OUTPATIENT
Start: 2021-11-04 | End: 2023-09-01 | Stop reason: SDUPTHER

## 2021-11-04 RX ORDER — MONTELUKAST SODIUM 10 MG/1
TABLET ORAL
COMMUNITY
End: 2022-02-03

## 2021-11-04 RX ORDER — AMLODIPINE BESYLATE 5 MG/1
TABLET ORAL
COMMUNITY
End: 2022-02-03

## 2021-11-04 RX ORDER — DOXYCYCLINE 100 MG/1
100 CAPSULE ORAL 2 TIMES DAILY
Qty: 20 CAPSULE | Refills: 0 | Status: SHIPPED | OUTPATIENT
Start: 2021-11-04 | End: 2022-02-03

## 2021-11-04 RX ADMIN — METHYLPREDNISOLONE ACETATE 40 MG: 40 INJECTION, SUSPENSION INTRA-ARTICULAR; INTRALESIONAL; INTRAMUSCULAR; SOFT TISSUE at 10:11

## 2021-11-12 ENCOUNTER — CLINICAL SUPPORT (OUTPATIENT)
Dept: REHABILITATION | Facility: HOSPITAL | Age: 51
End: 2021-11-12
Payer: COMMERCIAL

## 2021-11-12 DIAGNOSIS — M25.652 DECREASED RANGE OF LEFT HIP MOVEMENT: Primary | ICD-10-CM

## 2021-11-12 PROCEDURE — 97110 THERAPEUTIC EXERCISES: CPT | Mod: KX,PN,CQ,GP

## 2021-11-16 ENCOUNTER — CLINICAL SUPPORT (OUTPATIENT)
Dept: REHABILITATION | Facility: HOSPITAL | Age: 51
End: 2021-11-16
Payer: COMMERCIAL

## 2021-11-16 DIAGNOSIS — M25.652 DECREASED RANGE OF LEFT HIP MOVEMENT: Primary | ICD-10-CM

## 2021-11-16 PROCEDURE — 97110 THERAPEUTIC EXERCISES: CPT | Mod: KX,PN,CQ,GP

## 2021-11-22 ENCOUNTER — CLINICAL SUPPORT (OUTPATIENT)
Dept: REHABILITATION | Facility: HOSPITAL | Age: 51
End: 2021-11-22
Payer: COMMERCIAL

## 2021-11-22 DIAGNOSIS — M25.652 DECREASED RANGE OF LEFT HIP MOVEMENT: Primary | ICD-10-CM

## 2021-11-22 PROCEDURE — 97110 THERAPEUTIC EXERCISES: CPT | Mod: KX,PN,CQ

## 2021-11-23 ENCOUNTER — HOSPITAL ENCOUNTER (OUTPATIENT)
Dept: RADIOLOGY | Facility: HOSPITAL | Age: 51
Discharge: HOME | End: 2021-11-23
Attending: ORTHOPAEDIC SURGERY
Payer: COMMERCIAL

## 2021-11-23 ENCOUNTER — CLINICAL SUPPORT (OUTPATIENT)
Dept: REHABILITATION | Facility: HOSPITAL | Age: 51
End: 2021-11-23
Payer: COMMERCIAL

## 2021-11-23 VITALS — RESPIRATION RATE: 18 BRPM

## 2021-11-23 DIAGNOSIS — M25.652 DECREASED RANGE OF LEFT HIP MOVEMENT: Primary | ICD-10-CM

## 2021-11-23 DIAGNOSIS — S73.192A LABRAL TEAR OF LEFT HIP JOINT: Primary | ICD-10-CM

## 2021-11-23 DIAGNOSIS — S73.192A LABRAL TEAR OF LEFT HIP JOINT: ICD-10-CM

## 2021-11-23 PROCEDURE — 75989 ABSCESS DRAINAGE UNDER X-RAY: CPT | Mod: TC

## 2021-11-23 PROCEDURE — 75989: ICD-10-PCS | Mod: 26,,, | Performed by: RADIOLOGY

## 2021-11-23 PROCEDURE — 77012 CT SCAN FOR NEEDLE BIOPSY: CPT | Mod: CT

## 2021-11-23 PROCEDURE — 75989 ABSCESS DRAINAGE UNDER X-RAY: CPT | Mod: 26,,, | Performed by: RADIOLOGY

## 2021-11-23 PROCEDURE — 20610 DRAIN/INJ JOINT/BURSA W/O US: CPT | Mod: RT

## 2021-11-23 PROCEDURE — 97110 THERAPEUTIC EXERCISES: CPT | Mod: PN,CQ,GP

## 2021-11-30 ENCOUNTER — CLINICAL SUPPORT (OUTPATIENT)
Dept: REHABILITATION | Facility: HOSPITAL | Age: 51
End: 2021-11-30
Payer: COMMERCIAL

## 2021-11-30 DIAGNOSIS — S73.192S TEAR OF LEFT ACETABULAR LABRUM, SEQUELA: ICD-10-CM

## 2021-11-30 DIAGNOSIS — M25.551 PAIN IN RIGHT HIP: ICD-10-CM

## 2021-11-30 PROCEDURE — 97110 THERAPEUTIC EXERCISES: CPT | Mod: PN

## 2022-01-04 ENCOUNTER — OFFICE VISIT (OUTPATIENT)
Dept: ORTHOPEDICS | Facility: CLINIC | Age: 52
End: 2022-01-04
Payer: COMMERCIAL

## 2022-01-04 DIAGNOSIS — M25.551 PAIN IN RIGHT HIP: Primary | ICD-10-CM

## 2022-01-04 DIAGNOSIS — Z98.890 STATUS POST ARTHROSCOPY OF HIP: ICD-10-CM

## 2022-01-04 PROCEDURE — 1159F PR MEDICATION LIST DOCUMENTED IN MEDICAL RECORD: ICD-10-PCS | Mod: CPTII,,, | Performed by: ORTHOPAEDIC SURGERY

## 2022-01-04 PROCEDURE — 99213 OFFICE O/P EST LOW 20 MIN: CPT | Mod: ,,, | Performed by: ORTHOPAEDIC SURGERY

## 2022-01-04 PROCEDURE — 1159F MED LIST DOCD IN RCRD: CPT | Mod: CPTII,,, | Performed by: ORTHOPAEDIC SURGERY

## 2022-01-04 PROCEDURE — 99213 PR OFFICE/OUTPT VISIT, EST, LEVL III, 20-29 MIN: ICD-10-PCS | Mod: ,,, | Performed by: ORTHOPAEDIC SURGERY

## 2022-01-04 NOTE — PROGRESS NOTES
ASSESSMENT:      ICD-10-CM ICD-9-CM   1. Pain in right hip  M25.551 719.45   2. Status post arthroscopy of hip  Z98.890 V45.89       PLAN:     -Findings and treatment options were discussed with the patient  -All questions answered  Natural history and expected course discussed. Questions answered.  Educational materials distributed.  Home exercises discussed.      He is doing well following his left hip arthroscopy.  He states that his vas pain score is less than a 1 on a 10.  It is greatly improved compared to preoperatively.  His right hip continues to be a source of pain.  He had a recent injection is iliopsoas sheath under CT guidance which provided short-term relief of his pain.  Unfortunately the pain returned.  He is 10 years out from a total hip arthroplasty.  May be a candidate for revision hip replacement versus psoas tenotomy.  Will refer to Dr. Barron for evaluation  Patient Instructions   Refer -[facility] [employer]      IMAGING:  No results found.               CC:  Hip pain  51 y.o. Male returns to clinic for a follow up visit regarding hip pain.  Bilateral hip pain. Left is doing much better. Right is still giving him some trouble. The Psoas injection helped for a few weeks but gradually got worse.          REVIEW OF SYSTEMS:   Constitution: Negative. Negative for chills, fever and night sweats.    Hematologic/Lymphatic: Negative for bleeding problem. Does not bruise/bleed easily.   Skin: Negative for dry skin, itching and rash.   Musculoskeletal: Negative for falls. Positive for hip pain and muscle weakness.     All other review of symptoms were reviewed and found to be noncontributory.     PAST MEDICAL HISTORY:   Past Medical History:   Diagnosis Date    Aseptic necrosis of head of humerus 09/27/2013    Cyst of epididymis 02/18/2021    Degeneration of lumbar intervertebral disc 03/24/2013    Encounter for examination for driving license 08/06/2018    Hypertension     Hypokalemia      Lateral epicondylitis, left elbow 06/01/2020    Non-diabetic hypoglycemia 01/10/2012    Presence of right artificial hip joint 02/04/2016    Prolapsed cervical intervertebral disc 05/09/2013    without myelopathy       PAST SURGICAL HISTORY:   Past Surgical History:   Procedure Laterality Date    ARTHROSCOPY, HIP Left 9/20/2021    Procedure: ARTHROSCOPY, HIP, WITH LABRUM REPAIR;  Surgeon: Charles Yeh MD;  Location: [facility] OR;  Service: Orthopedics;  Laterality: Left;    ARTHROSCOPY, HIP Left 9/20/2021    Procedure: ARTHROSCOPY, HIP, WITH FEMOROPLASTY;  Surgeon: Charles Yeh MD;  Location: [facility] OR;  Service: Orthopedics;  Laterality: Left;    ARTHROSCOPY, HIP Left 9/20/2021    Procedure: ARTHROSCOPY, HIP, WITH ACETABULOPLASTY, WITH REPAIR OF LABRUM IF INDICATED;  Surgeon: Charles Yeh MD;  Location: [facility] OR;  Service: Orthopedics;  Laterality: Left;    CARPAL TUNNEL RELEASE      DIAGNOSTIC LAPAROSCOPY N/A 3/15/2021    Procedure: LAPAROSCOPY, DIAGNOSTIC;  Surgeon: Jay Nolen MD;  Location: [facility] OR;  Service: General;  Laterality: N/A;  1052 FAMILY INFORMED OF SURGERY START  1130 DR NOLEN TALKED TO PATIENT FAMILY    HIP SURGERY      inguinal hernia reparir  03/05/2021    Dr. Robert Nolen    ROBOT-ASSISTED LAPAROSCOPIC REPAIR OF INGUINAL HERNIA USING DA JALEN XI Bilateral 3/15/2021    Procedure: XI ROBOTIC INGUINAL HERNIA REPAIRS WITH MESH;  Surgeon: Jay Nolen MD;  Location: [facility] [place];  Service: General;  Laterality: Bilateral;    SHOULDER SURGERY      VARICOSE VEIN SURGERY         FAMILY HISTORY:   Family History   Problem Relation Age of Onset    Colon cancer Mother     Hypertension Mother     Diabetes Mellitus Father     Heart disease Father     Hypertension Father        SOCIAL HISTORY:   Social History     Socioeconomic History    Marital status:    Tobacco Use    Smoking status: Never Smoker    Smokeless tobacco: Never Used   Substance  and Sexual Activity    Alcohol use: Not Currently    Drug use: Not Currently    Sexual activity: Yes     Partners: Female       MEDICATIONS:     Current Outpatient Medications:     amLODIPine (NORVASC) 5 MG tablet, 1 tablet, Disp: , Rfl:     aspirin (ECOTRIN) 81 MG EC tablet, Take 81 mg by mouth once daily., Disp: , Rfl:     atenoloL-chlorthalidone (TENORETIC) 50-25 mg Tab, Take 1 tablet by mouth 2 (two) times daily., Disp: , Rfl:     CARTIA  mg 24 hr capsule, Take 240 mg by mouth once daily., Disp: , Rfl:     carvediloL (COREG) 12.5 MG tablet, Take 12.5 mg by mouth 2 (two) times daily with meals., Disp: , Rfl:     cetirizine (ZYRTEC) 10 MG tablet, Take 1 tablet (10 mg total) by mouth once daily., Disp: 30 tablet, Rfl: 5    colchicine (COLCRYS) 0.6 mg tablet, Take 0.6 mg by mouth as needed., Disp: , Rfl:     doxycycline (VIBRAMYCIN) 100 MG Cap, Take 1 capsule (100 mg total) by mouth 2 (two) times daily., Disp: 20 capsule, Rfl: 0    gabapentin (NEURONTIN) 100 MG capsule, Take 1 capsule (100 mg total) by mouth 3 (three) times daily., Disp: 90 capsule, Rfl: 11    ipratropium (ATROVENT) 42 mcg (0.06 %) nasal spray, 2 sprays by Nasal route 4 (four) times daily., Disp: 15 mL, Rfl: 5    montelukast (SINGULAIR) 10 mg tablet, 1 tablet in the evening, Disp: , Rfl:     naproxen (NAPROSYN) 500 MG tablet, Take 1 tablet (500 mg total) by mouth 2 (two) times daily with meals., Disp: 60 tablet, Rfl: 3    NIFEdipine (ADALAT CC) 30 MG TbSR, Take 30 mg by mouth nightly., Disp: , Rfl:     NIFEdipine (ADALAT CC) 60 MG TbSR, Take 60 mg by mouth once daily., Disp: , Rfl:     NIFEdipine (PROCARDIA-XL) 30 MG (OSM) 24 hr tablet, Take 30 mg by mouth every evening., Disp: , Rfl:     traMADoL (ULTRAM) 50 mg tablet, Take 1 tablet (50 mg total) by mouth every 6 (six) hours as needed for Pain., Disp: 30 tablet, Rfl: 0    ALLERGIES:   Review of patient's allergies indicates:   Allergen Reactions    Ace inhibitors Swelling     Lisinopril Anaphylaxis    Codeine Nausea And Vomiting    Mobic [meloxicam] Itching        PHYSICAL EXAMINATION:  There were no vitals taken for this visit.  General    Eyes: EOM are normal.   Cardiovascular: Intact distal pulses.    Neurological: He displays normal reflexes. No cranial nerve deficit. He exhibits normal muscle tone. Coordination normal.     General Musculoskeletal Exam   Pelvic Obliquity: none        Right Hip Exam     Inspection   Deformity of hip.    Range of Motion   Extension: abnormal   Flexion: abnormal   External rotation: abnormal   Internal rotation: abnormal     Tests   Pain w/ forced internal rotation (JOSE ARMANDO): present  Pain w/ forced external rotation (FADIR): present  Circumduction test: positive  Log Roll: positive    Other   Sensation: normal  Back (L-Spine & T-Spine) / Neck (C-Spine) Exam   Back exam is normal.    Back (L-Spine & T-Spine) Range of Motion   The patient has abnormal back ROM.      Vascular Exam     Right Pulses    Posterior Tibial:      2+                No orders of the defined types were placed in this encounter.      Procedures

## 2022-01-24 ENCOUNTER — HOSPITAL ENCOUNTER (OUTPATIENT)
Dept: RADIOLOGY | Facility: HOSPITAL | Age: 52
Discharge: HOME | End: 2022-01-24
Attending: ORTHOPAEDIC SURGERY
Payer: COMMERCIAL

## 2022-01-24 DIAGNOSIS — M25.551 RIGHT HIP PAIN: ICD-10-CM

## 2022-01-24 PROCEDURE — 73502 X-RAY EXAM HIP UNI 2-3 VIEWS: CPT | Mod: TC,RT

## 2022-02-03 ENCOUNTER — OFFICE VISIT (OUTPATIENT)
Dept: FAMILY MEDICINE | Facility: CLINIC | Age: 52
End: 2022-02-03
Payer: COMMERCIAL

## 2022-02-03 VITALS
RESPIRATION RATE: 18 BRPM | HEART RATE: 74 BPM | DIASTOLIC BLOOD PRESSURE: 80 MMHG | SYSTOLIC BLOOD PRESSURE: 110 MMHG | BODY MASS INDEX: 29.31 KG/M2 | WEIGHT: 221.19 LBS | HEIGHT: 73 IN | OXYGEN SATURATION: 98 %

## 2022-02-03 DIAGNOSIS — M25.551 PAIN IN RIGHT HIP: Primary | ICD-10-CM

## 2022-02-03 DIAGNOSIS — I10 HYPERTENSION, UNSPECIFIED TYPE: ICD-10-CM

## 2022-02-03 DIAGNOSIS — H16.203 KERATOCONJUNCTIVITIS OF BOTH EYES: ICD-10-CM

## 2022-02-03 DIAGNOSIS — K21.9 GASTROESOPHAGEAL REFLUX DISEASE WITHOUT ESOPHAGITIS: ICD-10-CM

## 2022-02-03 PROCEDURE — 1159F PR MEDICATION LIST DOCUMENTED IN MEDICAL RECORD: ICD-10-PCS | Mod: CPTII,,, | Performed by: NURSE PRACTITIONER

## 2022-02-03 PROCEDURE — 96372 THER/PROPH/DIAG INJ SC/IM: CPT | Mod: ,,, | Performed by: NURSE PRACTITIONER

## 2022-02-03 PROCEDURE — 3008F PR BODY MASS INDEX (BMI) DOCUMENTED: ICD-10-PCS | Mod: CPTII,,, | Performed by: NURSE PRACTITIONER

## 2022-02-03 PROCEDURE — 99213 PR OFFICE/OUTPT VISIT, EST, LEVL III, 20-29 MIN: ICD-10-PCS | Mod: 25,,, | Performed by: NURSE PRACTITIONER

## 2022-02-03 PROCEDURE — 1159F MED LIST DOCD IN RCRD: CPT | Mod: CPTII,,, | Performed by: NURSE PRACTITIONER

## 2022-02-03 PROCEDURE — 3079F PR MOST RECENT DIASTOLIC BLOOD PRESSURE 80-89 MM HG: ICD-10-PCS | Mod: CPTII,,, | Performed by: NURSE PRACTITIONER

## 2022-02-03 PROCEDURE — 1160F PR REVIEW ALL MEDS BY PRESCRIBER/CLIN PHARMACIST DOCUMENTED: ICD-10-PCS | Mod: CPTII,,, | Performed by: NURSE PRACTITIONER

## 2022-02-03 PROCEDURE — 3074F PR MOST RECENT SYSTOLIC BLOOD PRESSURE < 130 MM HG: ICD-10-PCS | Mod: CPTII,,, | Performed by: NURSE PRACTITIONER

## 2022-02-03 PROCEDURE — 99213 OFFICE O/P EST LOW 20 MIN: CPT | Mod: 25,,, | Performed by: NURSE PRACTITIONER

## 2022-02-03 PROCEDURE — 96372 PR INJECTION,THERAP/PROPH/DIAG2ST, IM OR SUBCUT: ICD-10-PCS | Mod: ,,, | Performed by: NURSE PRACTITIONER

## 2022-02-03 PROCEDURE — 3074F SYST BP LT 130 MM HG: CPT | Mod: CPTII,,, | Performed by: NURSE PRACTITIONER

## 2022-02-03 PROCEDURE — 1160F RVW MEDS BY RX/DR IN RCRD: CPT | Mod: CPTII,,, | Performed by: NURSE PRACTITIONER

## 2022-02-03 PROCEDURE — 3008F BODY MASS INDEX DOCD: CPT | Mod: CPTII,,, | Performed by: NURSE PRACTITIONER

## 2022-02-03 PROCEDURE — 3079F DIAST BP 80-89 MM HG: CPT | Mod: CPTII,,, | Performed by: NURSE PRACTITIONER

## 2022-02-03 RX ORDER — ESOMEPRAZOLE MAGNESIUM 20 MG/1
20 GRANULE, DELAYED RELEASE ORAL EVERY OTHER DAY
COMMUNITY
End: 2023-07-06 | Stop reason: SDUPTHER

## 2022-02-03 RX ORDER — LIFITEGRAST 50 MG/ML
SOLUTION/ DROPS OPHTHALMIC
COMMUNITY
Start: 2021-08-19

## 2022-02-03 RX ORDER — NEOMYCIN SULFATE, POLYMYXIN B SULFATE, AND DEXAMETHASONE 3.5; 10000; 1 MG/G; [USP'U]/G; MG/G
OINTMENT OPHTHALMIC
COMMUNITY
Start: 2021-10-11

## 2022-02-03 RX ORDER — NIFEDIPINE 30 MG/1
30 TABLET, FILM COATED, EXTENDED RELEASE ORAL 2 TIMES DAILY
COMMUNITY

## 2022-02-03 RX ORDER — CARVEDILOL 25 MG/1
TABLET ORAL
COMMUNITY
Start: 2022-02-01 | End: 2023-07-06

## 2022-02-03 RX ORDER — SPIRONOLACTONE 25 MG/1
25 TABLET ORAL DAILY
COMMUNITY

## 2022-02-03 RX ORDER — TRIAMCINOLONE ACETONIDE 40 MG/ML
40 INJECTION, SUSPENSION INTRA-ARTICULAR; INTRAMUSCULAR
Status: COMPLETED | OUTPATIENT
Start: 2022-02-03 | End: 2022-02-03

## 2022-02-03 RX ORDER — KETOROLAC TROMETHAMINE 10 MG/1
10 TABLET, FILM COATED ORAL EVERY 6 HOURS
Qty: 28 TABLET | Refills: 0 | Status: SHIPPED | OUTPATIENT
Start: 2022-02-03 | End: 2022-02-08

## 2022-02-03 RX ADMIN — TRIAMCINOLONE ACETONIDE 40 MG: 40 INJECTION, SUSPENSION INTRA-ARTICULAR; INTRAMUSCULAR at 04:02

## 2022-02-07 NOTE — PROGRESS NOTES
YAQUELIN Fatima   [place] [address] [address] [address]  [phone]      PATIENT NAME: Gary Moscoso  : 1970  DATE: 2/3/22  MRN: [id number]      Billing Provider: YAQUELIN Fatima  Level of Service:   Patient PCP Information     Provider PCP Type    YAQUELIN Daniels General          Reason for Visit / Chief Complaint: Abdominal Pain (Lower right quadrant pain when he takes a deep breath or when he is sitting down.  Reports that area is sore when you press on it.  Pain started yesterday.)       Update PCP  Update Chief Complaint         History of Present Illness / Problem Focused Workflow     Presents with complaints of right hip pain. Reports he has had this pain for several months and has been seen by orthopedics for ongoing problem with hip  He has had previous hip surgery and has scheduled appointment with ortho    Review of Systems     Review of Systems   Constitutional: Negative for chills, fatigue and fever.   HENT: Negative for congestion and sore throat.    Eyes: Negative for visual disturbance.   Respiratory: Negative for cough.    Cardiovascular: Negative for chest pain.   Gastrointestinal: Positive for abdominal pain. Negative for diarrhea and nausea.   Musculoskeletal: Positive for arthralgias. Negative for gait problem.        Right hip pain     Skin: Negative for wound.   Neurological: Negative for dizziness and headaches.   Psychiatric/Behavioral: Negative for dysphoric mood. The patient is not nervous/anxious.        Medical / Social / Family History     Past Medical History:   Diagnosis Date    Aseptic necrosis of head of humerus 2013    Cyst of epididymis 2021    Degeneration of lumbar intervertebral disc 2013    Encounter for examination for driving license 2018    Hypertension     Hypokalemia     Lateral epicondylitis, left elbow 2020    Non-diabetic hypoglycemia 01/10/2012    Presence of right  artificial hip joint 02/04/2016    Prolapsed cervical intervertebral disc 05/09/2013    without myelopathy       Past Surgical History:   Procedure Laterality Date    ARTHROSCOPY, HIP Left 9/20/2021    Procedure: ARTHROSCOPY, HIP, WITH LABRUM REPAIR;  Surgeon: Charles Yeh MD;  Location: [facility] OR;  Service: Orthopedics;  Laterality: Left;    ARTHROSCOPY, HIP Left 9/20/2021    Procedure: ARTHROSCOPY, HIP, WITH FEMOROPLASTY;  Surgeon: Charles Yeh MD;  Location: [facility] OR;  Service: Orthopedics;  Laterality: Left;    ARTHROSCOPY, HIP Left 9/20/2021    Procedure: ARTHROSCOPY, HIP, WITH ACETABULOPLASTY, WITH REPAIR OF LABRUM IF INDICATED;  Surgeon: Charles Yeh MD;  Location: [facility] OR;  Service: Orthopedics;  Laterality: Left;    CARPAL TUNNEL RELEASE      DIAGNOSTIC LAPAROSCOPY N/A 3/15/2021    Procedure: LAPAROSCOPY, DIAGNOSTIC;  Surgeon: Jay Nolen MD;  Location: [facility] OR;  Service: General;  Laterality: N/A;  1052 FAMILY INFORMED OF SURGERY START  1130 DR NOLEN TALKED TO PATIENT FAMILY    HIP SURGERY      inguinal hernia reparir  03/05/2021    Dr. Robert Nolen    ROBOT-ASSISTED LAPAROSCOPIC REPAIR OF INGUINAL HERNIA USING DA JALEN XI Bilateral 3/15/2021    Procedure: XI ROBOTIC INGUINAL HERNIA REPAIRS WITH MESH;  Surgeon: Jay Nolen MD;  Location: [facility];  Service: General;  Laterality: Bilateral;    SHOULDER SURGERY      VARICOSE VEIN SURGERY         Social History    reports that he has never smoked. He has never used smokeless tobacco. He reports previous alcohol use. He reports previous drug use.    Family History  's family history includes Colon cancer in his mother; Diabetes Mellitus in his father; Heart disease in his father; Hypertension in his father and mother.    Medications and Allergies     Medications  Outpatient Medications Marked as Taking for the 2/3/22 encounter (Office Visit) with YAQUELIN Fatima   Medication Sig Dispense Refill     CARTIA  mg 24 hr capsule Take 240 mg by mouth once daily.      carvediloL (COREG) 25 MG tablet TAKE 1 TABLET BY MOUTH TWICE DAILY DOSAGE INCREASE      cetirizine (ZYRTEC) 10 MG tablet Take 1 tablet (10 mg total) by mouth once daily. 30 tablet 5    colchicine (COLCRYS) 0.6 mg tablet Take 0.6 mg by mouth as needed.      esomeprazole (NEXIUM) 20 mg GrPS Take 20 mg by mouth every other day.      gabapentin (NEURONTIN) 100 MG capsule Take 1 capsule (100 mg total) by mouth 3 (three) times daily. 90 capsule 11    ipratropium (ATROVENT) 42 mcg (0.06 %) nasal spray 2 sprays by Nasal route 4 (four) times daily. 15 mL 5    neomycin-polymyxin-dexamethasone (DEXACINE) 3.5 mg/g-10,000 unit/g-0.1 % Oint SMARTSIG:Sparingly In Eye(s) Every Night      NIFEdipine (ADALAT CC) 30 MG TbSR Take 30 mg by mouth 2 (two) times a day.      spironolactone (ALDACTONE) 25 MG tablet Take 25 mg by mouth once daily.      traMADoL (ULTRAM) 50 mg tablet Take 1 tablet (50 mg total) by mouth every 6 (six) hours as needed for Pain. 30 tablet 0    XIIDRA 5 % Dpet Instill 1 drop into both eyes twice a day as directed      [DISCONTINUED] aspirin (ECOTRIN) 81 MG EC tablet Take 81 mg by mouth once daily.      [DISCONTINUED] naproxen (NAPROSYN) 500 MG tablet Take 1 tablet (500 mg total) by mouth 2 (two) times daily with meals. 60 tablet 3       Allergies  Review of patient's allergies indicates:   Allergen Reactions    Ace inhibitors Swelling    Lisinopril Anaphylaxis    Codeine Nausea And Vomiting    Mobic [meloxicam] Itching       Physical Examination     Vitals:    02/03/22 1625   BP: 110/80   Pulse: 74   Resp: 18     Physical Exam  Constitutional:       General: He is not in acute distress.  HENT:      Head: Normocephalic.      Nose: Nose normal. No congestion.      Mouth/Throat:      Mouth: Mucous membranes are moist.   Eyes:      Extraocular Movements: Extraocular movements intact.   Cardiovascular:      Rate and Rhythm: Normal  rate.      Heart sounds: Normal heart sounds.   Pulmonary:      Effort: Pulmonary effort is normal. No respiratory distress.   Abdominal:      General: Bowel sounds are normal. There is no distension.      Tenderness: There is no abdominal tenderness.   Musculoskeletal:         General: Tenderness (right hip area) present. No swelling or signs of injury. Normal range of motion.      Cervical back: Normal range of motion.   Skin:     General: Skin is warm.   Neurological:      Mental Status: He is alert.   Psychiatric:         Mood and Affect: Mood normal.           Imaging / Labs     No visits with results within 1 Day(s) from this visit.   Latest known visit with results is:   Lab Visit on 01/24/2022   Component Date Value Ref Range Status    CRP 01/24/2022 0.65  0.00 - 0.80 mg/dL Final    ESR Westergren 01/24/2022 6  0 - 20 mm/Hr Final    WBC 01/24/2022 5.21  4.50 - 11.00 K/uL Final    RBC 01/24/2022 4.93  4.60 - 6.20 M/uL Final    Hemoglobin 01/24/2022 14.7  13.5 - 18.0 g/dL Final    Hematocrit 01/24/2022 42.8  40.0 - 54.0 % Final    MCV 01/24/2022 86.8  80.0 - 96.0 fL Final    MCH 01/24/2022 29.8  27.0 - 31.0 pg Final    MCHC 01/24/2022 34.3  32.0 - 36.0 g/dL Final    RDW 01/24/2022 13.5  11.5 - 14.5 % Final    Platelet Count 01/24/2022 295  150 - 400 K/uL Final    MPV 01/24/2022 9.4  9.4 - 12.4 fL Final    Neutrophils % 01/24/2022 55.9  53.0 - 65.0 % Final    Lymphocytes % 01/24/2022 29.2  27.0 - 41.0 % Final    Monocytes % 01/24/2022 10.6* 2.0 - 6.0 % Final    Eosinophils % 01/24/2022 3.3  1.0 - 4.0 % Final    Basophils % 01/24/2022 0.8  0.0 - 1.0 % Final    Immature Granulocytes % 01/24/2022 0.2  0.0 - 0.4 % Final    nRBC, Auto 01/24/2022 0.0  <=0.0 % Final    Neutrophils, Abs 01/24/2022 2.92  1.80 - 7.70 K/uL Final    Lymphocytes, Absolute 01/24/2022 1.52  1.00 - 4.80 K/uL Final    Monocytes, Absolute 01/24/2022 0.55  0.00 - 0.80 K/uL Final    Eosinophils, Absolute 01/24/2022 0.17   0.00 - 0.50 K/uL Final    Basophils, Absolute 01/24/2022 0.04  0.00 - 0.20 K/uL Final    Immature Granulocytes, Absolute 01/24/2022 0.01  0.00 - 0.04 K/uL Final    nRBC, Absolute 01/24/2022 0.00  <=0.00 x10e3/uL Final    Diff Type 01/24/2022 Auto   Final     X-Ray Hip 2 or 3 views Right (with Pelvis when performed)  See Procedure Notes for results.     IMPRESSION: Please see Ortho procedure notes for report.      This procedure was auto-finalized by: Virtual Radiologist      Assessment and Plan (including Health Maintenance)      Problem List  Smart Sets  Document Outside HM   :    Health Maintenance Due   Topic Date Due    Hepatitis C Screening  Never done    Lipid Panel  Never done    HIV Screening  Never done    TETANUS VACCINE  Never done    Shingles Vaccine (1 of 2) Never done    COVID-19 Vaccine (3 - Booster) 08/25/2021    Influenza Vaccine (1) Never done       Problem List Items Addressed This Visit        Cardiac/Vascular    Hypertension    Relevant Medications    carvediloL (COREG) 25 MG tablet    NIFEdipine (ADALAT CC) 30 MG TbSR    spironolactone (ALDACTONE) 25 MG tablet       Orthopedic    Pain in right hip - Primary    Relevant Medications    ketorolac (TORADOL) 10 mg tablet      Other Visit Diagnoses     Keratoconjunctivitis of both eyes        Relevant Medications    neomycin-polymyxin-dexamethasone (DEXACINE) 3.5 mg/g-10,000 unit/g-0.1 % Oint    XIIDRA 5 % Dpet    Gastroesophageal reflux disease without esophagitis        Relevant Medications    esomeprazole (NEXIUM) 20 mg GrPS        Kenalog 40 mg IM today and toradol po as needed for right hip pain. Refill routine medications  Follow up with ortho 4/2022. Follow up here about 6 mo    Health Maintenance Topics with due status: Not Due       Topic Last Completion Date    Colorectal Cancer Screening 06/24/2021       Future Appointments   Date Time Provider Department Center   4/5/2022  8:45 AM Charles Yeh MD [facility]           Signature:  YAQUELIN Fatima  [address] [place] [address]  [phone]    Date of encounter: 2/3/22

## 2022-04-05 ENCOUNTER — OFFICE VISIT (OUTPATIENT)
Dept: ORTHOPEDICS | Facility: CLINIC | Age: 52
End: 2022-04-05
Payer: COMMERCIAL

## 2022-04-05 DIAGNOSIS — Z98.890 STATUS POST ARTHROSCOPY OF HIP: Primary | ICD-10-CM

## 2022-04-05 PROCEDURE — 99214 OFFICE O/P EST MOD 30 MIN: CPT | Mod: ,,, | Performed by: ORTHOPAEDIC SURGERY

## 2022-04-05 PROCEDURE — 99214 PR OFFICE/OUTPT VISIT, EST, LEVL IV, 30-39 MIN: ICD-10-PCS | Mod: ,,, | Performed by: ORTHOPAEDIC SURGERY

## 2022-04-05 RX ORDER — METHYLPREDNISOLONE 4 MG/1
TABLET ORAL
Qty: 1 TABLET | Refills: 0 | Status: SHIPPED | OUTPATIENT
Start: 2022-04-05 | End: 2023-07-06

## 2022-04-05 NOTE — PROGRESS NOTES
CC:  Hip pain  51 y.o. Male returns to clinic for a follow up visit regarding     ICD-10-CM ICD-9-CM   1. Status post arthroscopy of hip  Z98.890 V45.89       Pt is here for recheck left hip, he is 6- mos post hip arthroscopy.  today his pain has become worse in the buttock area, but he has some mild groin pain as well. He continue to have right hip pain.  [place] [facility] but that never materialized due to insurance issues.  His psoas injection did help but relief was transient.        REVIEW OF SYSTEMS:   Constitution: Negative. Negative for chills, fever and night sweats.    Hematologic/Lymphatic: Negative for bleeding problem. Does not bruise/bleed easily.   Skin: Negative for dry skin, itching and rash.   Musculoskeletal: Negative for falls. Positive for hip pain and muscle weakness.   All other review of symptoms were reviewed and found to be noncontributory.     PAST MEDICAL HISTORY:   Past Medical History:   Diagnosis Date    Aseptic necrosis of head of humerus 09/27/2013    Cyst of epididymis 02/18/2021    Degeneration of lumbar intervertebral disc 03/24/2013    Encounter for examination for driving license 08/06/2018    Hypertension     Hypokalemia     Lateral epicondylitis, left elbow 06/01/2020    Non-diabetic hypoglycemia 01/10/2012    Presence of right artificial hip joint 02/04/2016    Prolapsed cervical intervertebral disc 05/09/2013    without myelopathy       PAST SURGICAL HISTORY:   Past Surgical History:   Procedure Laterality Date    ARTHROSCOPY, HIP Left 9/20/2021    Procedure: ARTHROSCOPY, HIP, WITH LABRUM REPAIR;  Surgeon: Charles Yeh MD;  Location: [facility] OR;  Service: Orthopedics;  Laterality: Left;    ARTHROSCOPY, HIP Left 9/20/2021    Procedure: ARTHROSCOPY, HIP, WITH FEMOROPLASTY;  Surgeon: Charles Yeh MD;  Location: [facility] OR;  Service: Orthopedics;  Laterality: Left;    ARTHROSCOPY, HIP Left 9/20/2021     Procedure: ARTHROSCOPY, HIP, WITH ACETABULOPLASTY, WITH REPAIR OF LABRUM IF INDICATED;  Surgeon: Charles Yeh MD;  Location: [facility] OR;  Service: Orthopedics;  Laterality: Left;    CARPAL TUNNEL RELEASE      DIAGNOSTIC LAPAROSCOPY N/A 3/15/2021    Procedure: LAPAROSCOPY, DIAGNOSTIC;  Surgeon: Jay Nolen MD;  Location: [facility] OR;  Service: General;  Laterality: N/A;  1052 FAMILY INFORMED OF SURGERY START  1130 DR NOLEN TALKED TO PATIENT FAMILY    HIP SURGERY      inguinal hernia reparir  03/05/2021    Dr. Robert Nolen    ROBOT-ASSISTED LAPAROSCOPIC REPAIR OF INGUINAL HERNIA USING DA JALEN XI Bilateral 3/15/2021    Procedure: XI ROBOTIC INGUINAL HERNIA REPAIRS WITH MESH;  Surgeon: Jay Nolen MD;  Location: [facility] OR;  Service: General;  Laterality: Bilateral;    SHOULDER SURGERY      VARICOSE VEIN SURGERY           PHYSICAL EXAMINATION:  Ortho/SPM Exam  No pain with Susie, fadir exam. ttp along trochanteric bursa.      IMAGING:  No results found.       ASSESSMENT:      ICD-10-CM ICD-9-CM   1. Status post arthroscopy of hip  Z98.890 V45.89       PLAN:     -Findings and treatment options were discussed with the patient  -All questions answered  Natural history and expected course discussed. Questions answered.  Educational materials distributed.  Home exercises discussed.    Patient Instructions     PT for lumbar spine/left hip        Orders Placed This Encounter   Procedures    Ambulatory referral/consult to Physical/Occupational Therapy    Ambulatory referral/consult to Physical/Occupational Therapy       Procedures

## 2022-04-13 ENCOUNTER — CLINICAL SUPPORT (OUTPATIENT)
Dept: REHABILITATION | Facility: HOSPITAL | Age: 52
End: 2022-04-13
Payer: COMMERCIAL

## 2022-04-13 DIAGNOSIS — M25.552 LEFT HIP PAIN: Primary | ICD-10-CM

## 2022-04-13 DIAGNOSIS — Z98.890 STATUS POST ARTHROSCOPY OF HIP: ICD-10-CM

## 2022-04-13 PROCEDURE — 97110 THERAPEUTIC EXERCISES: CPT | Mod: PN

## 2022-04-13 PROCEDURE — 97161 PT EVAL LOW COMPLEX 20 MIN: CPT | Mod: PN

## 2022-04-13 NOTE — PLAN OF CARE
[facility]   Physical Therapy Initial Evaluation    Date: 4/13/2022   Name: Gary Moscoso  Clinic Number: [id number]    Therapy Diagnosis:   Encounter Diagnoses   Name Primary?    Status post arthroscopy of hip     Left hip pain Yes     Physician: Charles Yeh MD    Physician Orders: PT Eval and Treat    Medical Diagnosis from Referral: left hip pain  Evaluation Date: 4/13/2022  Updated Plan of Care Due : 5/12/22  Authorization Period Expiration: master   Plan of Care Expiration: see master approved through 6/10/22  Visit # / Visits authorized: 1/  10 visits     Time In: 933  Time Out: 1025  Total Appointment Time (timed & untimed codes): 55 minutes    Precautions: Standard    Subjective   Date of onset: pt had left hip scope last year 3/21/2022 .  Pt voices he has been having increased hip pain about a month ago , pt states he is aching feels like a toothache.  Pt states he has pain with sidelying on his left side. Pt states standing a long time and walking also bother him.     History of current condition - Gary reports: see above.       Medical History:   Past Medical History:   Diagnosis Date    Aseptic necrosis of head of humerus 09/27/2013    Cyst of epididymis 02/18/2021    Degeneration of lumbar intervertebral disc 03/24/2013    Encounter for examination for driving license 08/06/2018    Hypertension     Hypokalemia     Lateral epicondylitis, left elbow 06/01/2020    Non-diabetic hypoglycemia 01/10/2012    Presence of right artificial hip joint 02/04/2016    Prolapsed cervical intervertebral disc 05/09/2013    without myelopathy       Surgical History:   Gary Moscoso  has a past surgical history that includes Hip surgery; Shoulder surgery; Carpal tunnel release; Diagnostic laparoscopy (N/A, 3/15/2021); Robot-assisted laparoscopic repair of inguinal hernia using da Alpesh Xi (Bilateral, 3/15/2021); inguinal hernia reparir (03/05/2021); Varicose vein surgery; arthroscopy, hip  (Left, 9/20/2021); arthroscopy, hip (Left, 9/20/2021); and arthroscopy, hip (Left, 9/20/2021).    Medications:   Gary has a current medication list which includes the following prescription(s): cartia xt, carvedilol, cetirizine, colchicine, esomeprazole, gabapentin, ipratropium, methylprednisolone, neomycin-polymyxin-dexamethasone, nifedipine, spironolactone, tramadol, and xiidra.    Allergies:   Review of patient's allergies indicates:   Allergen Reactions    Ace inhibitors Swelling    Lisinopril Anaphylaxis    Codeine Nausea And Vomiting    Mobic [meloxicam] Itching        Imaging, bone scan films:      Prior Therapy: a year ago   Social History:   lives with their family  Occupation: disabled   Prior Level of Function: independent   Current Level of Function: pain with movement     Pain:  Current 5/10, worst 10/10, best 4/10   Location: left hip       Description: Aching, Dull, Tight and Tingling  Aggravating Factors: Sitting, Standing and walking   Easing Factors: nothing    Patients goals: pt states he wants to be able to stand and walk pain free wea    Objective           Incision :    Old scar no pain with old incision site             Range of Motion/Strength :                  Left Extremity                                                                        Right Extremity   AROM PROM Strength  Location  AROM    PROM   Strength   110 110 3+   Hip      Flexion 90 92 4   15  3+               Extension 5     30  3+               Internal Rotation (Prone) 20  4   30  3+               External Rotation (Prone) 30  4   25  3+               Abduction 10  4   -5             Quad lag  -5     135  4   Knee    Flexion 140  4   0  4                Extension 0  4   15     Ankle   Dorsiflexion 15                                               Functional Impairments :  Decreased hip  range of motion and strength. Right si dysfunction, left it band and greater trochanteric bursitis       Limitation/Restriction for  FOTO hip  Survey    Therapist reviewed FOTO scores for Gary Mai Ardd on 4/13/2022.   FOTO documents entered into EPIC - see Media section.    Limitation Score: 28%         TREATMENT     Pt has right sacroiliac dysfunction  And left IT band tendonitis and GREATER TROCHANTERIC BURSITIS     Gary received the treatments listed below:  THERAPEUTIC EXERCISES to develop strength, endurance, ROM and flexibility for 20 minutes including home ex program     Pt received cont us to left it band and greater trochanteric bursitis x 8 min at 1 mhz     Home Exercises and Patient Education Provided    Education provided:   - Pt performed and received home ex program.     Written Home Exercises Provided: yes.  Exercises were reviewed and Gary was able to demonstrate them prior to the end of the session.  Gary demonstrated good  understanding of the education provided.     See EMR under Media for exercises provided 4/13/2022.    Assessment   Gary is a 51 y.o. male referred to outpatient Physical Therapy with a medical diagnosis of left hip pain . Patient presents with right sacroiliac dysfunction, left hip pain with it band tendonitis and left greater trochanteric bursitis .      Patient prognosis is Excellent.   Patientt will benefit from skilled outpatient Physical Therapy to address the deficits stated above and in the chart below, provide patient /family education, and to maximize patientt's level of independence.     Plan of care discussed with patient: Yes  Patient's spiritual, cultural and educational needs considered and patient is agreeable to the plan of care and goals as stated below:     Anticipated Barriers for therapy: right sacroiliac dysfunction, left it band tendonitis , left greater trochanteric bursitis   Goals:  Short Term Goals: 4 weeks   Pt will be independent with home ex program   Pt will increase left hip flexion to 125 degrees  Pt will increase bilateral hip extension to 15 degrees   Increase bilateral  hip strength to 5/5   Pt will decrease pain to 4/10 at rest       Long Term Goals: 6 weeks   Pt will be able to sleep on the left side pain free   Pt will be able to walk 30 min without pain     Plan   Plan of care Certification: 4/13/2022 to 5/12/2022.    Outpatient Physical Therapy 2 times weekly for 4 weeks to include the following interventions: Patient Education, Therapeutic Exercise, Ultrasound and modalities as needed      .     González Solo, PT

## 2022-04-19 ENCOUNTER — CLINICAL SUPPORT (OUTPATIENT)
Dept: REHABILITATION | Facility: HOSPITAL | Age: 52
End: 2022-04-19
Payer: COMMERCIAL

## 2022-04-19 DIAGNOSIS — M25.552 LEFT HIP PAIN: Primary | ICD-10-CM

## 2022-04-19 DIAGNOSIS — Z98.890 STATUS POST ARTHROSCOPY OF HIP: ICD-10-CM

## 2022-04-19 PROCEDURE — 97110 THERAPEUTIC EXERCISES: CPT | Mod: PN,CQ

## 2022-04-19 NOTE — PROGRESS NOTES
Physical Therapy Treatment Note     Name: Gary Mai [facility]  Clinic Number: [id number]    Therapy Diagnosis:   Encounter Diagnoses   Name Primary?    Status post arthroscopy of hip     Left hip pain Yes     Physician: Charles Yeh MD    Visit Date: 4/19/2022    Physician Orders: PT Eval and Treat    Medical Diagnosis from Referral: left hip pain  Evaluation Date: 4/13/2022  Updated Plan of Care Due : 5/12/22  Authorization Period Expiration: master   Plan of Care Expiration: see master approved through 6/10/22  Visit # / Visits authorized: 2/  10 visits   PTA Visit #: 1    Time In: 0845  Time Out: 0920  Total Billable Time: 35 minutes    Precautions: Standard    Received Plan of Care per González Solo PT     Subjective     Pt reports: pain as noted; no pain meds taken today  He was compliant with home exercise program.  Response to previous treatment: sore      Pain: 3/10  Location: left hip      Objective     Gary received therapeutic exercises to develop strength, ROM, flexibility and core stabilization for 35 minutes including:    Bike x 5 minutes   Total gym bilateral squats x 20 repetitions   Total gym bilateral calf raises x 20 repetitions   Slant board bilateral calf stretch x 2 minutes   Hamstring stretch on step, 3x20 second hold   iliotibial band stretching, 3x15 second hold   Side bending stretch, 3x15 second hold   Bridging with hip adduction isometrics x 15 repetitions   hooklying hip abduction with blue band x 15 repetitions   bilateral lower extremity stretches, 3x20 second hold each:   Single knee to chest, hamstring, hip external rotation, piriformis    Range of motion measures:   Hip flexion left 117 degrees  right 110 degrees   Hamstring  left   -5 degrees right   -5 degrees     Hip extension  left    5 degrees  right    5 degrees       Gary received the following direct contact modalities after being cleared for contraindications: Ultrasound:  Gary received ultrasound to manage pain and  inflammation at 100 % duty cycle applied to the left hip at an intensity of NA W/cm2  for a duration of NA minutes. Patient tolerated treatment well without adverse effects. Therapist was in attendance throughout intervention.    Gary received the following supervised modalities after being cleared for contradictions: IFC Electrical Stimulation:  Gary received IFC Electrical Stimulation for pain control applied to the NA. Pt received stimulation at 100 % scan at a frequency of NA for NA minutes. Gary tolderated treatment well without any adverse effects.    Gary received hot pack for NA minutes to NA.      Home Exercises Provided and Patient Education Provided     Education provided: continue current home exercise program, pain free    Written Home Exercises Provided: Patient instructed to cont prior HEP.  Exercises were reviewed and Gary was able to demonstrate them prior to the end of the session.  Gary demonstrated good  understanding of the education provided.     See EMR under Patient Instructions for exercises provided prior visit.    Assessment     Pain improved to 2/10 post treatment; pt declined modalities today  Gary Is progressing well towards his goals.   Pt prognosis is Excellent.     Pt will continue to benefit from skilled outpatient physical therapy to address the deficits listed in the problem list box on initial evaluation, provide pt/family education and to maximize pt's level of independence in the home and community environment.     Anticipated barriers to physical therapy: home exercise program compliance    Goals:  Short Term Goals: 4 weeks   Pt will be independent with home ex program   Pt will increase left hip flexion to 125 degrees  Pt will increase bilateral hip extension to 15 degrees   Increase bilateral hip strength to 5/5   Pt will decrease pain to 4/10 at rest         Long Term Goals: 6 weeks   Pt will be able to sleep on the left side pain free   Pt will be able to walk 30  min without pain     Plan     Plan of care Certification: 4/13/2022 to 5/12/2022.  Outpatient Physical Therapy 2 times weekly for 4 weeks to include the following interventions: Patient Education, Therapeutic Exercise, Ultrasound and modalities as needed    Continue per Plan of Care and progress as pt able   Kavitha Bauman, PTA  4/19/2022

## 2022-04-21 ENCOUNTER — CLINICAL SUPPORT (OUTPATIENT)
Dept: REHABILITATION | Facility: HOSPITAL | Age: 52
End: 2022-04-21
Payer: COMMERCIAL

## 2022-04-21 DIAGNOSIS — M25.552 LEFT HIP PAIN: Primary | ICD-10-CM

## 2022-04-21 DIAGNOSIS — M25.652 DECREASED RANGE OF LEFT HIP MOVEMENT: ICD-10-CM

## 2022-04-21 PROCEDURE — 97110 THERAPEUTIC EXERCISES: CPT | Mod: PN,CQ

## 2022-04-21 PROCEDURE — 97035 APP MDLTY 1+ULTRASOUND EA 15: CPT | Mod: PN,CQ

## 2022-04-21 NOTE — PROGRESS NOTES
Physical Therapy Treatment Note     Name: Gary Mai [facility]  Clinic Number: [id number]    Therapy Diagnosis:   No diagnosis found.  Physician: Charles Yeh MD    Visit Date: 4/21/2022    Physician Orders: PT Eval and Treat    Medical Diagnosis from Referral: left hip pain  Evaluation Date: 4/13/2022  Updated Plan of Care Due : 5/12/22  Authorization Period Expiration: amblenar   Plan of Care Expiration: see master approved through 6/10/22  Visit # / Visits authorized: 3/  10 visits   PTA Visit #: 2    Time In: 932  Time Out: 1010  Total Billable Time: 38 minutes    Precautions: Standard    Received Plan of Care per González Solo PT     Subjective     Pt reports: I had to take pain pill this am. I hurt in my left buttock.  He was compliant with home exercise program.  Response to previous treatment: sore    Pain: 5/10  Location: left hip      Objective     Gary received therapeutic exercises to develop strength, ROM, flexibility and core stabilization for 30 minutes including:    Bike x 6 minutes   Total gym bilateral squats x 20 repetitions   Total gym bilateral calf raises x 20 repetitions   Slant board bilateral calf stretch x 2 minutes   Hamstring stretch on step, 3x20 second hold   iliotibial band stretching, 3x15 second hold   Side bending stretch, 3x15 second hold   Bridging with hip adduction isometrics x 15 repetitions   bilateral lower extremity stretches, 3x20 second hold each:   Single knee to chest, hip external rotation, ( left only)  piriformis    Range of motion measures:   Hip flexion left 110 degrees  right 110 degrees   Hamstring  left   -10 degrees right   -8 degrees     Hip extension  left    5 degrees  right    5 degrees     Gary received the following direct contact modalities after being cleared for contraindications: Ultrasound:  Gary received ultrasound to manage pain and inflammation at 100 % duty cycle applied to the left hip at an intensity of 2.0 W/cm2/mhz  for a duration of 8  minutes. Patient tolerated treatment well without adverse effects. Therapist was in attendance throughout intervention.    Gary received the following supervised modalities after being cleared for contradictions: IFC Electrical Stimulation:  Gary received IFC Electrical Stimulation for pain control applied to the NA. Pt received stimulation at 100 % scan at a frequency of NA for NA minutes. Gary tolderated treatment well without any adverse effects.    Gary received hot pack for NA minutes to NA.      Home Exercises Provided and Patient Education Provided     Education provided: continue current home exercise program, pain free    Written Home Exercises Provided: Patient instructed to cont prior HEP.  Exercises were reviewed and Gary was able to demonstrate them prior to the end of the session.  Gary demonstrated good  understanding of the education provided.     See EMR under Patient Instructions for exercises provided prior visit.    Assessment     Pain tender to touch along left piriformis. Patient unable to perform right hip external rotation figure 4 stretch. Voicing decreased pain 2/10 after treatment.  Gary Is progressing well towards his goals.   Pt prognosis is Excellent.     Pt will continue to benefit from skilled outpatient physical therapy to address the deficits listed in the problem list box on initial evaluation, provide pt/family education and to maximize pt's level of independence in the home and community environment.     Anticipated barriers to physical therapy: home exercise program compliance    Goals:  Short Term Goals: 4 weeks   Pt will be independent with home ex program -met  Pt will increase left hip flexion to 125 degrees  Pt will increase bilateral hip extension to 15 degrees   Increase bilateral hip strength to 5/5   Pt will decrease pain to 4/10 at rest         Long Term Goals: 6 weeks   Pt will be able to sleep on the left side pain free   Pt will be able to walk 30 min without  pain     Plan     Plan of care Certification: 4/13/2022 to 5/12/2022.  Outpatient Physical Therapy 2 times weekly for 4 weeks to include the following interventions: Patient Education, Therapeutic Exercise, Ultrasound and modalities as needed    Continue per Plan of Care and progress as pt able   Od[place] Colon, PTA  4/21/2022

## 2022-04-26 ENCOUNTER — CLINICAL SUPPORT (OUTPATIENT)
Dept: REHABILITATION | Facility: HOSPITAL | Age: 52
End: 2022-04-26
Payer: COMMERCIAL

## 2022-04-26 DIAGNOSIS — M25.551 PAIN IN RIGHT HIP: ICD-10-CM

## 2022-04-26 DIAGNOSIS — M25.652 DECREASED RANGE OF LEFT HIP MOVEMENT: ICD-10-CM

## 2022-04-26 DIAGNOSIS — M25.552 LEFT HIP PAIN: Primary | ICD-10-CM

## 2022-04-26 PROCEDURE — 97110 THERAPEUTIC EXERCISES: CPT | Mod: PN

## 2022-04-26 PROCEDURE — 97035 APP MDLTY 1+ULTRASOUND EA 15: CPT | Mod: PN

## 2022-04-26 NOTE — PROGRESS NOTES
Physical Therapy Treatment Note     Name: Gary Mai [facility]  Clinic Number: [id number]    Therapy Diagnosis:   Encounter Diagnoses   Name Primary?    Left hip pain Yes    Decreased range of left hip movement     Pain in right hip      Physician: Charles Yeh MD    Visit Date: 4/26/2022    Physician Orders: PT Eval and Treat    Medical Diagnosis from Referral: left hip pain  Evaluation Date: 4/13/2022  Updated Plan of Care Due : 5/12/22  Authorization Period Expiration: master   Plan of Care Expiration: see master approved through 6/10/22  Visit # / Visits authorized: 4/  10 visits   PTA Visit #:     Time In: 845  Time Out: 921  Total Billable Time: 32 minutes    Precautions: Standard    Received Plan of Care per González Solo PT     Subjective     Pt reports: right side back si joint pain has resolved,  Pt voices left greater trochanteric bursitis pain and it band pain . Pt voices suffering from allergies today   He was compliant with home exercise program.  Response to previous treatment: sore    Pain: 3/10  Location: left hip      Objective     Gary received therapeutic exercises to develop strength, ROM, flexibility and core stabilization for 30 minutes including:    Bike x 6 minutes   Total gym bilateral squats x 20 repetitions   Total gym bilateral calf raises x 20 repetitions   Slant board bilateral calf stretch x 2 minutes   Hamstring stretch on step, 3x20 second hold   iliotibial band stretching, 3x15 second hold   Side bending stretch, 3x15 second hold   Bridging with hip adduction isometrics x 15 repetitions   bilateral lower extremity stretches, 3x20 second hold each:   Single knee to chest, hip external rotation, ( left only)  piriformis    Range of motion measures:   Hip flexion left 110 degrees  right 110 degrees   Hamstring  left   -10 degrees right   -8 degrees     Hip extension  left    5 degrees  right    5 degrees     Gary received the following direct contact modalities after being  cleared for contraindications: Ultrasound:  Gary received ultrasound to manage pain and inflammation at 100 % duty cycle applied to the left hip at an intensity of 2.0 W/cm2/mhz  for a duration of 8 minutes. Patient tolerated treatment well without adverse effects. Therapist was in attendance throughout intervention.          Home Exercises Provided and Patient Education Provided     Education provided: continue current home exercise program, pain free    Written Home Exercises Provided: Patient instructed to cont prior HEP.  Exercises were reviewed and Gary was able to demonstrate them prior to the end of the session.  Gary demonstrated good  understanding of the education provided.     See EMR under Patient Instructions for exercises provided prior visit.    Assessment   Pt tx cut short secondary to suffering from allergies   Pain tender to touch along left piriformis. Patient unable to perform right hip external rotation figure 4 stretch. Voicing decreased pain 2/10 after treatment.  Gary Is progressing well towards his goals.   Pt prognosis is Excellent.     Pt will continue to benefit from skilled outpatient physical therapy to address the deficits listed in the problem list box on initial evaluation, provide pt/family education and to maximize pt's level of independence in the home and community environment.     Anticipated barriers to physical therapy: home exercise program compliance    Goals:  Short Term Goals: 4 weeks   Pt will be independent with home ex program -met  Pt will increase left hip flexion to 125 degrees  Pt will increase bilateral hip extension to 15 degrees   Increase bilateral hip strength to 5/5   Pt will decrease pain to 4/10 at rest         Long Term Goals: 6 weeks   Pt will be able to sleep on the left side pain free   Pt will be able to walk 30 min without pain     Plan     Plan of care Certification: 4/13/2022 to 5/12/2022.  Outpatient Physical Therapy 2 times weekly for 4 weeks  to include the following interventions: Patient Education, Therapeutic Exercise, Ultrasound and modalities as needed      Plan of care has been reestablished with Sandrita LYNN and Kavitha LYNN.       González Solo, PT  4/26/2022

## 2022-04-28 ENCOUNTER — CLINICAL SUPPORT (OUTPATIENT)
Dept: REHABILITATION | Facility: HOSPITAL | Age: 52
End: 2022-04-28
Payer: COMMERCIAL

## 2022-04-28 DIAGNOSIS — M25.552 LEFT HIP PAIN: ICD-10-CM

## 2022-04-28 DIAGNOSIS — M25.652 DECREASED RANGE OF LEFT HIP MOVEMENT: Primary | ICD-10-CM

## 2022-04-28 PROCEDURE — 97110 THERAPEUTIC EXERCISES: CPT | Mod: PN,CQ

## 2022-04-28 NOTE — PROGRESS NOTES
Physical Therapy Treatment Note     Name: Gary Mai [facility]  Clinic Number: [id number]    Therapy Diagnosis:   Encounter Diagnoses   Name Primary?    Left hip pain     Decreased range of left hip movement Yes     Physician: Charles Yeh MD    Visit Date: 4/28/2022    Physician Orders: PT Eval and Treat    Medical Diagnosis from Referral: left hip pain  Evaluation Date: 4/13/2022  Updated Plan of Care Due : 5/12/22  Authorization Period Expiration: master   Plan of Care Expiration: see master approved through 6/10/22  Visit # / Visits authorized: 5/  10 visits   PTA Visit #: 1    Time In: 0842  Time Out: 925  Total Billable Time: 43 minutes     Precautions: Standard    Continue per Plan of Care per González Solo PT     Subjective     Pt reports: better today regarding allergies; pain as noted; has taken pain meds; states it feels like a tooth ache when driving, sitting    He was compliant with home exercise program.  Response to previous treatment: sore    Pain: 3/10  Location: left hip      Objective     Gary received therapeutic exercises to develop strength, ROM, flexibility and core stabilization for 43 minutes including:    Bike x 5 minutes   Total gym bilateral squats x 20 repetitions   Total gym bilateral calf raises x 20 repetitions   cybex leg press x 20 repetitions, 75#  Multi hip abduction, extension x 10 repetitions each, #3 plate  Slant board bilateral calf stretch x 2 minutes   Hamstring stretch on step, 3x20 second hold   iliotibial band stretching, 3x15 second hold   Side bending stretch, 3x15 second hold   Bridging with hip adduction isometrics x 15 repetitions   Prone hip extension x 10 repetitions each  bilateral lower extremity stretches, 3x15 second hold each:   Single knee to chest, hamstring, hip external rotation, piriformis    Range of motion measures:   Hip flexion left 112 degrees  right 110 degrees   Hamstring  left  - 5 degrees right   -4 degrees     Hip extension  left    7 degrees   right    8 degrees     Gary received the following direct contact modalities after being cleared for contraindications: Ultrasound:  Gary received ultrasound to manage pain and inflammation at 100 % duty cycle applied to the left hip at an intensity of 2.0 W/cm2/mhz  for a duration of NA minutes. Patient tolerated treatment well without adverse effects. Therapist was in attendance throughout intervention.      Home Exercises Provided and Patient Education Provided     Education provided: continue current home exercise program, pain free    Written Home Exercises Provided: Patient instructed to cont prior HEP.  Exercises were reviewed and Gary was able to demonstrate them prior to the end of the session.  Gary demonstrated good  understanding of the education provided.     Assessment     Increased range of motion as noted above; reports decreased pain to 1/10  Gary Is progressing well towards his goals.   Pt prognosis is Excellent.     Pt will continue to benefit from skilled outpatient physical therapy to address the deficits listed in the problem list box on initial evaluation, provide pt/family education and to maximize pt's level of independence in the home and community environment.     Anticipated barriers to physical therapy: home exercise program compliance    Goals:  Short Term Goals: 4 weeks   Pt will be independent with home ex program -met  Pt will increase left hip flexion to 125 degrees  Pt will increase bilateral hip extension to 15 degrees   Increase bilateral hip strength to 5/5   Pt will decrease pain to 4/10 at rest      Long Term Goals: 6 weeks   Pt will be able to sleep on the left side pain free   Pt will be able to walk 30 min without pain     Plan     Plan of care Certification: 4/13/2022 to 5/12/2022.  Outpatient Physical Therapy 2 times weekly for 4 weeks to include the following interventions: Patient Education, Therapeutic Exercise, Ultrasound and modalities as needed    Continue per  Plan of Care and progress as pt able   Kavitha Bauman, PTA  4/28/2022

## 2022-05-03 ENCOUNTER — CLINICAL SUPPORT (OUTPATIENT)
Dept: REHABILITATION | Facility: HOSPITAL | Age: 52
End: 2022-05-03
Payer: COMMERCIAL

## 2022-05-03 DIAGNOSIS — M25.652 DECREASED RANGE OF LEFT HIP MOVEMENT: ICD-10-CM

## 2022-05-03 DIAGNOSIS — M25.552 LEFT HIP PAIN: Primary | ICD-10-CM

## 2022-05-03 PROCEDURE — 97110 THERAPEUTIC EXERCISES: CPT | Mod: PN,CQ

## 2022-05-03 NOTE — PROGRESS NOTES
Physical Therapy Treatment Note     Name: Gary Mai [facility]  Clinic Number: [id number]    Therapy Diagnosis:   No diagnosis found.  Physician: Charles Yeh MD    Visit Date: 5/3/2022    Physician Orders: PT Eval and Treat    Medical Diagnosis from Referral: left hip pain  Evaluation Date: 4/13/2022  Updated Plan of Care Due : 5/12/22  Authorization Period Expiration: amblenar   Plan of Care Expiration: see master approved through 6/10/22  Visit # / Visits authorized: 6/  10 visits   PTA Visit #: 2    Time In: 843  Time Out: 922  Total Billable Time: 39 minutes     Precautions: Standard    Continue per Plan of Care per González Solo PT     Subjective     Pt reports: I've been sore in hip ever since trying to cross my right leg over left to stretch.  He was compliant with home exercise program.  Response to previous treatment: sore    Pain: 3/10  Location: left hip      Objective     Gary received therapeutic exercises to develop strength, ROM, flexibility and core stabilization for 39 minutes including:    Bike x 5 minutes   Total gym bilateral squats x 20 repetitions   Total gym bilateral calf raises x 20 repetitions   cybex leg press x 20 repetitions, 80#  Double support toe presses 20 x 80#  Multi hip abduction, extension x 10 repetitions each, #4 plate  Slant board bilateral calf stretch x 2 minutes   Hamstring stretch on step, 3x20 second hold   iliotibial band stretching, 3x15 second hold   Prone hip extension x 10 repetitions each  bilateral lower extremity stretches, 3x15 second hold each:   Single knee to chest, hip external rotation- left side only, piriformis  Bilateral single support bridging x 10    Range of motion measures:   Hip flexion left 112 degrees  right 112 degrees   Hamstring  left  - 10 degrees right   -10 degrees     Hip extension  left    8 degrees  right    8 degrees     Gary received the following direct contact modalities after being cleared for contraindications: Ultrasound:  Gary  received ultrasound to manage pain and inflammation at 100 % duty cycle applied to the left hip at an intensity of 2.0 W/cm2/mhz  for a duration of NA minutes. Patient tolerated treatment well without adverse effects. Therapist was in attendance throughout intervention.      Home Exercises Provided and Patient Education Provided     Education provided: continue current home exercise program, pain free    Written Home Exercises Provided: Patient instructed to cont prior HEP.  Exercises were reviewed and Gary was able to demonstrate them prior to the end of the session.  Gary demonstrated good  understanding of the education provided.     Assessment     Patient progressing with lower extremity strengthening.  0Terry Is progressing well towards his goals.   Pt prognosis is Excellent.     Pt will continue to benefit from skilled outpatient physical therapy to address the deficits listed in the problem list box on initial evaluation, provide pt/family education and to maximize pt's level of independence in the home and community environment.     Anticipated barriers to physical therapy: home exercise program compliance    Goals:  Short Term Goals: 4 weeks   Pt will be independent with home ex program -met  Pt will increase left hip flexion to 125 degrees  Pt will increase bilateral hip extension to 15 degrees   Increase bilateral hip strength to 5/5   Pt will decrease pain to 4/10 at rest      Long Term Goals: 6 weeks   Pt will be able to sleep on the left side pain free   Pt will be able to walk 30 min without pain     Plan     Plan of care Certification: 4/13/2022 to 5/12/2022.  Outpatient Physical Therapy 2 times weekly for 4 weeks to include the following interventions: Patient Education, Therapeutic Exercise, Ultrasound and modalities as needed    Continue per Plan of Care and progress as pt able   Odessa Colon, PTA  5/3/2022

## 2022-05-06 ENCOUNTER — CLINICAL SUPPORT (OUTPATIENT)
Dept: REHABILITATION | Facility: HOSPITAL | Age: 52
End: 2022-05-06
Payer: COMMERCIAL

## 2022-05-06 DIAGNOSIS — M25.552 LEFT HIP PAIN: Primary | ICD-10-CM

## 2022-05-06 DIAGNOSIS — M25.652 DECREASED RANGE OF LEFT HIP MOVEMENT: ICD-10-CM

## 2022-05-06 PROCEDURE — 97110 THERAPEUTIC EXERCISES: CPT | Mod: PN,CQ

## 2022-05-06 NOTE — PROGRESS NOTES
Physical Therapy Treatment Note     Name: Gary Mai [facility]  Clinic Number: [id number]    Therapy Diagnosis:   Encounter Diagnoses   Name Primary?    Left hip pain Yes    Decreased range of left hip movement      Physician: Charles Yeh MD    Visit Date: 5/6/2022    Physician Orders: PT Eval and Treat    Medical Diagnosis from Referral: left hip pain  Evaluation Date: 4/13/2022  Updated Plan of Care Due : 5/12/22  Authorization Period Expiration: master   Plan of Care Expiration: see master approved through 6/10/22  Visit # / Visits authorized: 7/  10 visits   PTA Visit #: 3    Time In: 843  Time Out: 921  Total Billable Time: 38 minutes     Precautions: Standard  ortho -may   Continue per Plan of Care per González Solo, PT     Subjective     Pt reports: I got steroid shot for sinus infection and its helped with my hip pain as well.  He was compliant with home exercise program.  Response to previous treatment: sore    Pain: 1/10  Location: left hip      Objective     Gary received therapeutic exercises to develop strength, ROM, flexibility and core stabilization for 38 minutes including:    Bike x 5 minutes   Total gym bilateral squats x 20 repetitions   Total gym bilateral calf raises x 20 repetitions   cybex leg press x 20 repetitions, 80#  Double support toe presses 20 x 80#  Multi hip abduction, extension x 10 repetitions each, #4 plate  Slant board bilateral calf stretch x 2 minutes   Hamstring stretch on step, 3x20 second hold   iliotibial band stretching, 3x15 second hold   bilateral lower extremity stretches, 3x15 second hold each:   Single knee to chest, piriformis, hip adduction   Bilateral single support bridging x 10  Hip adduction with bolster x 10    Range of motion measures:   Hip flexion left 112 degrees  right 112 degrees   Hamstring  left  - 10 degrees right   -10 degrees     Hip extension  left    8 degrees  right    8 degrees     Gary received the following direct contact modalities after  being cleared for contraindications: Ultrasound:  Gary received ultrasound to manage pain and inflammation at 100 % duty cycle applied to the left hip at an intensity of 2.0 W/cm2/mhz  for a duration of NA minutes. Patient tolerated treatment well without adverse effects. Therapist was in attendance throughout intervention.      Home Exercises Provided and Patient Education Provided     Education provided: continue current home exercise program, pain free    Written Home Exercises Provided: Patient instructed to cont prior HEP.  Exercises were reviewed and Gary was able to demonstrate them prior to the end of the session.  Gary demonstrated good  understanding of the education provided.     Assessment     Patient progressing with glut med strengthening.   Pt prognosis is Excellent.     Pt will continue to benefit from skilled outpatient physical therapy to address the deficits listed in the problem list box on initial evaluation, provide pt/family education and to maximize pt's level of independence in the home and community environment.     Anticipated barriers to physical therapy: home exercise program compliance    Goals:  Short Term Goals: 4 weeks   Pt will be independent with home ex program -met  Pt will increase left hip flexion to 125 degrees  Pt will increase bilateral hip extension to 15 degrees   Increase bilateral hip strength to 5/5   Pt will decrease pain to 4/10 at rest      Long Term Goals: 6 weeks   Pt will be able to sleep on the left side pain free   Pt will be able to walk 30 min without pain     Plan     Plan of care Certification: 4/13/2022 to 5/12/2022.  Outpatient Physical Therapy 2 times weekly for 4 weeks to include the following interventions: Patient Education, Therapeutic Exercise, Ultrasound and modalities as needed      Odessa Colon, PTA  5/6/2022

## 2022-05-10 ENCOUNTER — CLINICAL SUPPORT (OUTPATIENT)
Dept: REHABILITATION | Facility: HOSPITAL | Age: 52
End: 2022-05-10
Payer: COMMERCIAL

## 2022-05-10 DIAGNOSIS — M25.652 DECREASED RANGE OF LEFT HIP MOVEMENT: ICD-10-CM

## 2022-05-10 DIAGNOSIS — M25.552 LEFT HIP PAIN: Primary | ICD-10-CM

## 2022-05-10 PROCEDURE — 97110 THERAPEUTIC EXERCISES: CPT | Mod: PN

## 2022-05-10 PROCEDURE — 97035 APP MDLTY 1+ULTRASOUND EA 15: CPT | Mod: PN

## 2022-05-10 NOTE — PROGRESS NOTES
Physical Therapy Treatment Note     Name: Gary Mai [facility]  Clinic Number: [id number]    Therapy Diagnosis:   No diagnosis found.  Physician: Charles Yeh MD    Visit Date: 5/10/2022    Physician Orders: PT Eval and Treat    Medical Diagnosis from Referral: left hip pain  Evaluation Date: 4/13/2022  Updated Plan of Care Due : 5/12/22  Authorization Period Expiration: amblenar   Plan of Care Expiration: see master approved through 6/10/22  Visit # / Visits authorized: 8/  10 visits   PTA Visit #:     Time In: 845  Time Out: 932  Total Billable Time: 47 minutes     Precautions: Standard  ortho -may   Continue per Plan of Care per González Solo PT     Subjective     Pt reports: I having left side hip pain that is deep today.     He was compliant with home exercise program.  Response to previous treatment: sore    Pain: 3/10  Location: left hip      Objective     Gary received therapeutic exercises to develop strength, ROM, flexibility and core stabilization for 38 minutes including:    Bike x 5 minutes   Total gym bilateral squats x 20 repetitions   Total gym bilateral calf raises x 20 repetitions   cybex leg press x 20 repetitions, 80#  Double support toe presses 20 x 80#  Multi hip abduction, extension x 10 repetitions each, #4 plate  Slant board bilateral calf stretch x 2 minutes   Hamstring stretch on step, 3x20 second hold   iliotibial band stretching, 3x15 second hold   bilateral lower extremity stretches, 3x15 second hold each:   Single knee to chest, piriformis, hip adduction   Bilateral single support bridging x 10  Hip adduction with bolster x 10    Range of motion measures:   Hip flexion left 112 degrees  right 112 degrees   Hamstring  left  - 10 degrees right   -10 degrees     Hip extension  left    8 degrees  right    8 degrees     Gary received the following direct contact modalities after being cleared for contraindications: Ultrasound:  Gary received ultrasound to manage pain and inflammation at  100 % duty cycle applied to the left hip at an intensity of 2.0 W/cm2/mhz  for a duration of NA minutes. Patient tolerated treatment well without adverse effects. Therapist was in attendance throughout intervention.      Home Exercises Provided and Patient Education Provided     Education provided: continue current home exercise program, pain free    Written Home Exercises Provided: Patient instructed to cont prior HEP.  Exercises were reviewed and Gary was able to demonstrate them prior to the end of the session.  Gary demonstrated good  understanding of the education provided.     Assessment     Patient progressing with glut med strengthening.   Pt prognosis is Excellent.     Pt will continue to benefit from skilled outpatient physical therapy to address the deficits listed in the problem list box on initial evaluation, provide pt/family education and to maximize pt's level of independence in the home and community environment.     Anticipated barriers to physical therapy: home exercise program compliance    Goals:  Short Term Goals: 4 weeks   Pt will be independent with home ex program -met  Pt will increase left hip flexion to 125 degrees  Pt will increase bilateral hip extension to 15 degrees   Increase bilateral hip strength to 5/5   Pt will decrease pain to 4/10 at rest      Long Term Goals: 6 weeks   Pt will be able to sleep on the left side pain free   Pt will be able to walk 30 min without pain     Plan     Plan of care Certification: 4/13/2022 to 5/12/2022.  Outpatient Physical Therapy 2 times weekly for 4 weeks to include the following interventions: Patient Education, Therapeutic Exercise, Ultrasound and modalities as needed      González Solo, PT  5/10/2022

## 2022-05-12 ENCOUNTER — CLINICAL SUPPORT (OUTPATIENT)
Dept: REHABILITATION | Facility: HOSPITAL | Age: 52
End: 2022-05-12
Payer: COMMERCIAL

## 2022-05-12 DIAGNOSIS — M25.552 LEFT HIP PAIN: Primary | ICD-10-CM

## 2022-05-12 DIAGNOSIS — M25.652 DECREASED RANGE OF LEFT HIP MOVEMENT: ICD-10-CM

## 2022-05-12 PROCEDURE — 97110 THERAPEUTIC EXERCISES: CPT | Mod: PN,CQ

## 2022-05-12 PROCEDURE — 97035 APP MDLTY 1+ULTRASOUND EA 15: CPT | Mod: PN,CQ

## 2022-05-12 NOTE — PLAN OF CARE
Physical Therapy Treatment Note      Name: Gary Mai [facility]  Clinic Number: [id number]     Therapy Diagnosis:        Encounter Diagnoses   Name Primary?    Left hip pain Yes    Decreased range of left hip movement        Physician: Charles Yeh MD     Visit Date: 5/12/2022     Physician Orders: PT Eval and Treat    Medical Diagnosis from Referral: left hip pain  Evaluation Date: 4/13/2022  Updated Plan of Care Due : 5/12/22  Authorization Period Expiration: master   Plan of Care Expiration: see master approved through 6/10/22  Visit # / Visits authorized: 9/  10 visits   PTA Visit #:      Time In: 843  Time Out: 925  Total Billable Time: 42 minutes      Precautions: Standard  ortho -5/16/22  Continue per Plan of Care per González Solo PT      Subjective      Pt reports:i still having some pain in the hip, I see  on Monday. I hope he'll an xray and shot in my hip.     He was compliant with home exercise program.  Response to previous treatment: sore     Pain: 3/10  Location: left hip       Objective      Gary received therapeutic exercises to develop strength, ROM, flexibility and core stabilization for 34 minutes including:     Bike x 5 minutes   Total gym bilateral squats x 20 repetitions   Total gym bilateral calf raises x 20 repetitions   cybex leg press x 20 repetitions, 80#  Double support toe presses 20 x 80#  Multi hip abduction, extension x 10 repetitions each, #4 plate  Slant board bilateral calf stretch x 2 minutes   Hamstring stretch on step, 3x20 second hold   iliotibial band stretching, 3x15 second hold   bilateral lower extremity stretches, 3x15 second hold each:              Single knee to chest, piriformis, hip adduction   Bilateral single support bridging x 10  Hip adduction with bolster x 10     Range of motion measures:   Hip flexion       left 115 degrees          right 112 degrees   Hamstring        left  - 10 degrees         right   -11 degrees     Hip extension  left    10 degrees          right    10 degrees      Gary received the following direct contact modalities after being cleared for contraindications: Ultrasound:  Gary received ultrasound to manage pain and inflammation at 100 % duty cycle applied to the left hip at an intensity of 2.0 W/cm2/mhz  for a duration of 8 minutes. Patient tolerated treatment well without adverse effects. Therapist was in attendance throughout intervention.        Home Exercises Provided and Patient Education Provided      Education provided: continue current home exercise program     Written Home Exercises Provided: Patient instructed to cont prior HEP.  Exercises were reviewed and Gary was able to demonstrate them prior to the end of the session.  Gary demonstrated good  understanding of the education provided.      Assessment      Patient instructed to be consistent with home exercise program.  Pt prognosis is Excellent.      Pt will continue to benefit from skilled outpatient physical therapy to address the deficits listed in the problem list box on initial evaluation, provide pt/family education and to maximize pt's level of independence in the home and community environment.      Anticipated barriers to physical therapy: home exercise program compliance     Goals:  Short Term Goals: 4 weeks   Pt will be independent with home ex program -met  Pt will increase left hip flexion to 125 degrees  Pt will increase bilateral hip extension to 15 degrees   Increase bilateral hip strength to 5/5- not met on lower extremity   Pt will decrease pain to 4/10 at rest -met     Long Term Goals: 6 weeks   Pt will be able to sleep on the left side pain free   Pt will be able to walk 30 min without pain      Plan       Outpatient Therapy Discharge Summary     Name: Gary Mai [facility]  Clinic Number: [id number]    Therapy Diagnosis:   Encounter Diagnoses   Name Primary?    Left hip pain Yes    Decreased range of left hip movement      Physician: Charles Yeh,  MD        Assessment    Goals:  Pt met most goals doing well to cont with home ex program     Discharge reason: Patient has completed the physician's prescription and Patient has met all of his/her goals    Plan   This patient is discharged from Physical Therapy.    González Solo, PT

## 2022-05-12 NOTE — PROGRESS NOTES
Physical Therapy Treatment Note     Name: Gary Mai [facility]  Clinic Number: [id number]    Therapy Diagnosis:   Encounter Diagnoses   Name Primary?    Left hip pain Yes    Decreased range of left hip movement      Physician: Charles Yeh MD    Visit Date: 5/12/2022    Physician Orders: PT Eval and Treat    Medical Diagnosis from Referral: left hip pain  Evaluation Date: 4/13/2022  Updated Plan of Care Due : 5/12/22  Authorization Period Expiration: master   Plan of Care Expiration: see master approved through 6/10/22  Visit # / Visits authorized: 9/  10 visits   PTA Visit #: 1    Time In: 843  Time Out: 925  Total Billable Time: 42 minutes     Precautions: Standard  ortho -5/16/22  Continue per Plan of Care per González Solo PT     Subjective     Pt reports:i still having some pain in the hip, I see  on Monday. I hope he'll an xray and shot in my hip.    He was compliant with home exercise program.  Response to previous treatment: sore    Pain: 3/10  Location: left hip      Objective     Gary received therapeutic exercises to develop strength, ROM, flexibility and core stabilization for 34 minutes including:    Bike x 5 minutes   Total gym bilateral squats x 20 repetitions   Total gym bilateral calf raises x 20 repetitions   cybex leg press x 20 repetitions, 80#  Double support toe presses 20 x 80#  Multi hip abduction, extension x 10 repetitions each, #4 plate  Slant board bilateral calf stretch x 2 minutes   Hamstring stretch on step, 3x20 second hold   iliotibial band stretching, 3x15 second hold   bilateral lower extremity stretches, 3x15 second hold each:   Single knee to chest, piriformis, hip adduction   Bilateral single support bridging x 10  Hip adduction with bolster x 10    Range of motion measures:   Hip flexion left 115 degrees  right 112 degrees   Hamstring  left  - 10 degrees right   -11 degrees     Hip extension  left    10 degrees  right    10 degrees     Gary received the following direct  contact modalities after being cleared for contraindications: Ultrasound:  Gary received ultrasound to manage pain and inflammation at 100 % duty cycle applied to the left hip at an intensity of 2.0 W/cm2/mhz  for a duration of 8 minutes. Patient tolerated treatment well without adverse effects. Therapist was in attendance throughout intervention.      Home Exercises Provided and Patient Education Provided     Education provided: continue current home exercise program    Written Home Exercises Provided: Patient instructed to cont prior HEP.  Exercises were reviewed and Gary was able to demonstrate them prior to the end of the session.  Gary demonstrated good  understanding of the education provided.     Assessment     Patient instructed to be consistent with home exercise program.  Pt prognosis is Excellent.     Pt will continue to benefit from skilled outpatient physical therapy to address the deficits listed in the problem list box on initial evaluation, provide pt/family education and to maximize pt's level of independence in the home and community environment.     Anticipated barriers to physical therapy: home exercise program compliance    Goals:  Short Term Goals: 4 weeks   Pt will be independent with home ex program -met  Pt will increase left hip flexion to 125 degrees  Pt will increase bilateral hip extension to 15 degrees   Increase bilateral hip strength to 5/5- not met on lower extremity   Pt will decrease pain to 4/10 at rest -met     Long Term Goals: 6 weeks   Pt will be able to sleep on the left side pain free   Pt will be able to walk 30 min without pain     Plan     Will d/c see physical therapist note    Odessa Colon, PTA  5/12/2022

## 2022-05-16 DIAGNOSIS — Z98.890 STATUS POST ARTHROSCOPY OF HIP: Primary | ICD-10-CM

## 2022-05-17 ENCOUNTER — HOSPITAL ENCOUNTER (OUTPATIENT)
Dept: RADIOLOGY | Facility: HOSPITAL | Age: 52
Discharge: HOME | End: 2022-05-17
Attending: ORTHOPAEDIC SURGERY
Payer: COMMERCIAL

## 2022-05-17 ENCOUNTER — OFFICE VISIT (OUTPATIENT)
Dept: ORTHOPEDICS | Facility: CLINIC | Age: 52
End: 2022-05-17
Payer: COMMERCIAL

## 2022-05-17 DIAGNOSIS — Z98.890 STATUS POST ARTHROSCOPY OF HIP: ICD-10-CM

## 2022-05-17 DIAGNOSIS — M70.62 GREATER TROCHANTERIC BURSITIS OF LEFT HIP: Primary | ICD-10-CM

## 2022-05-17 PROCEDURE — 73502 X-RAY EXAM HIP UNI 2-3 VIEWS: CPT | Mod: TC,LT

## 2022-05-17 PROCEDURE — 73502 X-RAY EXAM HIP UNI 2-3 VIEWS: CPT | Mod: 26,LT,, | Performed by: ORTHOPAEDIC SURGERY

## 2022-05-17 PROCEDURE — 20610 DRAIN/INJ JOINT/BURSA W/O US: CPT | Mod: LT,,, | Performed by: ORTHOPAEDIC SURGERY

## 2022-05-17 PROCEDURE — 99213 OFFICE O/P EST LOW 20 MIN: CPT | Mod: 25,,, | Performed by: ORTHOPAEDIC SURGERY

## 2022-05-17 PROCEDURE — 20610 LARGE JOINT ASPIRATION/INJECTION: L GREATER TROCHANTERIC BURSA: ICD-10-PCS | Mod: LT,,, | Performed by: ORTHOPAEDIC SURGERY

## 2022-05-17 PROCEDURE — 73502 XR HIP WITH PELVIS WHEN PERFORMED, 2 OR 3 VIEWS LEFT: ICD-10-PCS | Mod: 26,LT,, | Performed by: ORTHOPAEDIC SURGERY

## 2022-05-17 PROCEDURE — 99213 PR OFFICE/OUTPT VISIT, EST, LEVL III, 20-29 MIN: ICD-10-PCS | Mod: 25,,, | Performed by: ORTHOPAEDIC SURGERY

## 2022-05-17 RX ORDER — TRIAMCINOLONE ACETONIDE 40 MG/ML
80 INJECTION, SUSPENSION INTRA-ARTICULAR; INTRAMUSCULAR
Status: DISCONTINUED | OUTPATIENT
Start: 2022-05-17 | End: 2022-05-17 | Stop reason: HOSPADM

## 2022-05-17 RX ADMIN — TRIAMCINOLONE ACETONIDE 80 MG: 40 INJECTION, SUSPENSION INTRA-ARTICULAR; INTRAMUSCULAR at 08:05

## 2022-05-17 NOTE — PROGRESS NOTES
CC:  Hip pain  51 y.o. Male returns to clinic for a follow up visit regarding     ICD-10-CM ICD-9-CM   1. Greater trochanteric bursitis of left hip  M70.62 726.5   2. Status post arthroscopy of hip  Z98.890 V45.89       PT is 8 months post op left hip arthroscopy. He is still having some pain. Reports his pain stays about a 3 out of 10. He just finished up PT.  he is complaining of laterally based hip pain today in his left hip.  Has lost weight    SANE score left hip: 80  Pre-op: 40     REVIEW OF SYSTEMS:   Constitution: Negative. Negative for chills, fever and night sweats.    Hematologic/Lymphatic: Negative for bleeding problem. Does not bruise/bleed easily.   Skin: Negative for dry skin, itching and rash.   Musculoskeletal: Negative for falls. Positive for hip pain and muscle weakness.   All other review of symptoms were reviewed and found to be noncontributory.     PAST MEDICAL HISTORY:   Past Medical History:   Diagnosis Date    Aseptic necrosis of head of humerus 09/27/2013    Cyst of epididymis 02/18/2021    Degeneration of lumbar intervertebral disc 03/24/2013    Encounter for examination for driving license 08/06/2018    Hypertension     Hypokalemia     Lateral epicondylitis, left elbow 06/01/2020    Non-diabetic hypoglycemia 01/10/2012    Presence of right artificial hip joint 02/04/2016    Prolapsed cervical intervertebral disc 05/09/2013    without myelopathy       PAST SURGICAL HISTORY:   Past Surgical History:   Procedure Laterality Date    ARTHROSCOPY, HIP Left 9/20/2021    Procedure: ARTHROSCOPY, HIP, WITH LABRUM REPAIR;  Surgeon: Charles Yeh MD;  Location: [facility] OR;  Service: Orthopedics;  Laterality: Left;    ARTHROSCOPY, HIP Left 9/20/2021    Procedure: ARTHROSCOPY, HIP, WITH FEMOROPLASTY;  Surgeon: Charles Yeh MD;  Location: [facility] OR;  Service: Orthopedics;  Laterality: Left;    ARTHROSCOPY, HIP Left 9/20/2021    Procedure: ARTHROSCOPY, HIP, WITH  ACETABULOPLASTY, WITH REPAIR OF LABRUM IF INDICATED;  Surgeon: Charles Yeh MD;  Location: [facility] OR;  Service: Orthopedics;  Laterality: Left;    CARPAL TUNNEL RELEASE      DIAGNOSTIC LAPAROSCOPY N/A 3/15/2021    Procedure: LAPAROSCOPY, DIAGNOSTIC;  Surgeon: Jay Nolen MD;  Location: [facility] OR;  Service: General;  Laterality: N/A;  1052 FAMILY INFORMED OF SURGERY START  1130 DR NOLEN TALKED TO PATIENT FAMILY    HIP SURGERY      inguinal hernia reparir  03/05/2021    Dr. Robert Nolen    ROBOT-ASSISTED LAPAROSCOPIC REPAIR OF INGUINAL HERNIA USING DA JALEN XI Bilateral 3/15/2021    Procedure: XI ROBOTIC INGUINAL HERNIA REPAIRS WITH MESH;  Surgeon: Jay Nolen MD;  Location: [facility] OR;  Service: General;  Laterality: Bilateral;    SHOULDER SURGERY      VARICOSE VEIN SURGERY           PHYSICAL EXAMINATION:  General    Nursing note and vitals reviewed.  Constitutional: He is oriented to person, place, and time. He appears well-developed and well-nourished.   HENT:   Head: Normocephalic and atraumatic.   Nose: Nose normal.   Eyes: EOM are normal. Pupils are equal, round, and reactive to light.   Neck: Neck supple.   Cardiovascular: Normal rate and intact distal pulses.    Pulmonary/Chest: Effort normal. No respiratory distress. He exhibits no tenderness.   Abdominal: Soft. He exhibits no distension. There is no abdominal tenderness.   Neurological: He is alert and oriented to person, place, and time. He has normal reflexes.   Psychiatric: He has a normal mood and affect. His behavior is normal. Judgment and thought content normal.         Left Hip Exam     Range of Motion   Abduction: normal   Adduction: normal   Extension: normal   Flexion: normal   External rotation: normal   Internal rotation: normal     Tests   Pain w/ forced internal rotation (JOSE ARMANDO): absent  Pain w/ forced external rotation (FADIR): absent    Other   Sensation: normal          Muscle Strength   Left Lower Extremity    Hip Abduction: 4/5         IMAGING:  X-Ray Hip 2 or 3 views Left (with Pelvis when performed)    Radiographs left hip were obtained today.  These demonstrate the presence of a prior cam resection of the left femoral neck.  Hip does not demonstrate any signs of osteoarthritis.  Right hip is images well in demonstrates presence of heterotopic ossification but a stable appearing hip prosthesis.    ASSESSMENT:      ICD-10-CM ICD-9-CM   1. Greater trochanteric bursitis of left hip  M70.62 726.5   2. Status post arthroscopy of hip  Z98.890 V45.89       PLAN:     -Findings and treatment options were discussed with the patient  -All questions answered  Natural history and expected course discussed. Questions answered.  Educational materials distributed.  Steroid injection for trochanteric bursitis. See procedure note.  He has some degree of trochanteric bursitis but no Fader exam in no JOSE ARMANDO exam as well.  I think intra-articularly his hip is doing well but these problems likely manifested secondary to the presence of bilateral hip pathology.  Educational program was given as were stretching exercises.  There are no Patient Instructions on file for this visit.    Orders Placed This Encounter   Procedures    Large Joint Aspiration/Injection: L greater trochanteric bursa       Large Joint Aspiration/Injection: L greater trochanteric bursa    Date/Time: 5/17/2022 8:45 AM  Performed by: Charles Yeh MD  Authorized by: Charles Yeh MD     Consent Done?:  Yes (Verbal)  Indications:  Pain  Site marked: the procedure site was marked    Local anesthetic:  Bupivacaine 0.25% without epinephrine    Details:  Needle Size:  22 G  Ultrasonic Guidance for needle placement?: No    Approach:  Lateral  Location:  Hip  Site:  L greater trochanteric bursa  Medications:  80 mg triamcinolone acetonide 40 mg/mL  Patient tolerance:  Patient tolerated the procedure well with no immediate complications

## 2022-09-06 ENCOUNTER — OFFICE VISIT (OUTPATIENT)
Dept: ORTHOPEDICS | Facility: CLINIC | Age: 52
End: 2022-09-06
Payer: COMMERCIAL

## 2022-09-06 DIAGNOSIS — Z96.641 STATUS POST TOTAL HIP REPLACEMENT, RIGHT: ICD-10-CM

## 2022-09-06 DIAGNOSIS — M70.62 GREATER TROCHANTERIC BURSITIS OF LEFT HIP: Primary | ICD-10-CM

## 2022-09-06 PROCEDURE — 99214 OFFICE O/P EST MOD 30 MIN: CPT | Mod: 25,,, | Performed by: ORTHOPAEDIC SURGERY

## 2022-09-06 PROCEDURE — 99214 PR OFFICE/OUTPT VISIT, EST, LEVL IV, 30-39 MIN: ICD-10-PCS | Mod: 25,,, | Performed by: ORTHOPAEDIC SURGERY

## 2022-09-06 PROCEDURE — 1160F RVW MEDS BY RX/DR IN RCRD: CPT | Mod: CPTII,,, | Performed by: ORTHOPAEDIC SURGERY

## 2022-09-06 PROCEDURE — 20610 LARGE JOINT ASPIRATION/INJECTION: L GREATER TROCHANTERIC BURSA: ICD-10-PCS | Mod: LT,,, | Performed by: ORTHOPAEDIC SURGERY

## 2022-09-06 PROCEDURE — 1159F MED LIST DOCD IN RCRD: CPT | Mod: CPTII,,, | Performed by: ORTHOPAEDIC SURGERY

## 2022-09-06 PROCEDURE — 20610 DRAIN/INJ JOINT/BURSA W/O US: CPT | Mod: LT,,, | Performed by: ORTHOPAEDIC SURGERY

## 2022-09-06 PROCEDURE — 1160F PR REVIEW ALL MEDS BY PRESCRIBER/CLIN PHARMACIST DOCUMENTED: ICD-10-PCS | Mod: CPTII,,, | Performed by: ORTHOPAEDIC SURGERY

## 2022-09-06 PROCEDURE — 1159F PR MEDICATION LIST DOCUMENTED IN MEDICAL RECORD: ICD-10-PCS | Mod: CPTII,,, | Performed by: ORTHOPAEDIC SURGERY

## 2022-09-06 RX ORDER — TRIAMCINOLONE ACETONIDE 40 MG/ML
80 INJECTION, SUSPENSION INTRA-ARTICULAR; INTRAMUSCULAR
Status: DISCONTINUED | OUTPATIENT
Start: 2022-09-06 | End: 2022-09-06 | Stop reason: HOSPADM

## 2022-09-06 RX ADMIN — TRIAMCINOLONE ACETONIDE 80 MG: 40 INJECTION, SUSPENSION INTRA-ARTICULAR; INTRAMUSCULAR at 09:09

## 2022-09-06 NOTE — PROGRESS NOTES
CC:  Hip pain  51 y.o. Male returns to clinic for a follow up visit regarding     ICD-10-CM ICD-9-CM   1. Greater trochanteric bursitis of left hip  M70.62 726.5   2. Status post total hip replacement, right  Z96.641 V43.64       Patient states that his hip has started hurting again since his last injection in 5/17.   He states that he did get some relief from that injection.     He also states that his right hip has started hurting as well. The pain he feels there is lateral as well as in the groin.   He has not had an injection in the right hip.   He states that he has had a right hip replacement in 2013 by Dr. PUENTES  Has recently had PT for his right and left hip.         REVIEW OF SYSTEMS:   Constitution: Negative. Negative for chills, fever and night sweats.    Hematologic/Lymphatic: Negative for bleeding problem. Does not bruise/bleed easily.   Skin: Negative for dry skin, itching and rash.   Musculoskeletal: Negative for falls. Positive for hip pain and muscle weakness.   All other review of symptoms were reviewed and found to be noncontributory.     PAST MEDICAL HISTORY:   Past Medical History:   Diagnosis Date    Aseptic necrosis of head of humerus 09/27/2013    Cyst of epididymis 02/18/2021    Degeneration of lumbar intervertebral disc 03/24/2013    Encounter for examination for driving license 08/06/2018    Hypertension     Hypokalemia     Lateral epicondylitis, left elbow 06/01/2020    Non-diabetic hypoglycemia 01/10/2012    Presence of right artificial hip joint 02/04/2016    Prolapsed cervical intervertebral disc 05/09/2013    without myelopathy       PAST SURGICAL HISTORY:   Past Surgical History:   Procedure Laterality Date    ARTHROSCOPY, HIP Left 9/20/2021    Procedure: ARTHROSCOPY, HIP, WITH LABRUM REPAIR;  Surgeon: Charles Yeh MD;  Location: [facility];  Service: Orthopedics;  Laterality: Left;    ARTHROSCOPY, HIP Left 9/20/2021    Procedure: ARTHROSCOPY, HIP, WITH FEMOROPLASTY;   Surgeon: Charles Yeh MD;  Location: [facility] OR;  Service: Orthopedics;  Laterality: Left;    ARTHROSCOPY, HIP Left 9/20/2021    Procedure: ARTHROSCOPY, HIP, WITH ACETABULOPLASTY, WITH REPAIR OF LABRUM IF INDICATED;  Surgeon: Charles Yeh MD;  Location: [facility] OR;  Service: Orthopedics;  Laterality: Left;    CARPAL TUNNEL RELEASE      DIAGNOSTIC LAPAROSCOPY N/A 3/15/2021    Procedure: LAPAROSCOPY, DIAGNOSTIC;  Surgeon: Jay Nolen MD;  Location: [facility] OR;  Service: General;  Laterality: N/A;  1052 FAMILY INFORMED OF SURGERY START  1130 DR NOLEN TALKED TO PATIENT FAMILY    HIP SURGERY      inguinal hernia reparir  03/05/2021    Dr. Robert Nolen    ROBOT-ASSISTED LAPAROSCOPIC REPAIR OF INGUINAL HERNIA USING DA JALEN XI Bilateral 3/15/2021    Procedure: XI ROBOTIC INGUINAL HERNIA REPAIRS WITH MESH;  Surgeon: Jay Nolen MD;  Location: [facility] OR;  Service: General;  Laterality: Bilateral;    SHOULDER SURGERY      VARICOSE VEIN SURGERY           PHYSICAL EXAMINATION:  General    Nursing note and vitals reviewed.  Constitutional: He is oriented to person, place, and time. He appears well-developed and well-nourished.   HENT:   Head: Normocephalic and atraumatic.   Nose: Nose normal.   Eyes: EOM are normal. Pupils are equal, round, and reactive to light.   Neck: Neck supple.   Cardiovascular:  Normal rate and intact distal pulses.            Pulmonary/Chest: Effort normal. No respiratory distress. He exhibits no tenderness.   Abdominal: Soft. He exhibits no distension. There is no abdominal tenderness.   Neurological: He is alert and oriented to person, place, and time. He has normal reflexes. He displays normal reflexes. No cranial nerve deficit. He exhibits normal muscle tone. Coordination normal.   Psychiatric: He has a normal mood and affect. His behavior is normal. Judgment and thought content normal.     General Musculoskeletal Exam   Pelvic Obliquity: none        Right Hip Exam      Inspection   Deformity of hip.    Range of Motion   Extension:  abnormal   Flexion:  abnormal   External rotation:  abnormal   Internal rotation:  abnormal     Tests   Pain w/ forced internal rotation (JEFF): present  Pain w/ forced external rotation (FADIR): present  Circumduction test: positive  Log Roll: positive    Other   Sensation: normal  Left Hip Exam     Range of Motion   Abduction:  normal   Adduction:  normal   Extension:  normal   Flexion:  normal   External rotation:  normal   Internal rotation: normal     Tests   Pain w/ forced internal rotation (JEFF): absent  Pain w/ forced external rotation (FADIR): absent    Other   Sensation: normal      Back (L-Spine & T-Spine) / Neck (C-Spine) Exam   Back exam is normal.    Back (L-Spine & T-Spine) Range of Motion   The patient has abnormal back ROM.      Muscle Strength   Left Lower Extremity   Hip Abduction: 4/5     Vascular Exam     Right Pulses    Posterior Tibial:      2+      + trendelenberg gait pattern.  Rt hip abductor complex appears weak (4/5 strength)  Left hip abductor 5/5. No pain with jeff, fadir on left     IMAGING:  No results found.       ASSESSMENT:      ICD-10-CM ICD-9-CM   1. Greater trochanteric bursitis of left hip  M70.62 726.5   2. Status post total hip replacement, right  Z96.641 V43.64       PLAN:     -Findings and treatment options were discussed with the patient  -All questions answered  Natural history and expected course discussed. Questions answered.  Educational materials distributed.  Home exercises discussed.  OTC analgesics as needed.  Steroid injection for trochanteric bursitis. See procedure note.  Will try one more hip injection.  If no better, we could consider MRI of the left hip  There are no Patient Instructions on file for this visit.    No orders of the defined types were placed in this encounter.      Large Joint Aspiration/Injection: L greater trochanteric bursa    Date/Time: 9/6/2022 9:15 AM  Performed by:  Charles Yeh MD  Authorized by: Charles Yeh MD     Consent Done?:  Yes (Verbal)  Indications:  Pain  Site marked: the procedure site was marked    Local anesthetic:  Bupivacaine 0.25% without epinephrine    Details:  Needle Size:  22 G  Ultrasonic Guidance for needle placement?: No    Approach:  Lateral  Location:  Hip  Site:  L greater trochanteric bursa  Medications:  80 mg triamcinolone acetonide 40 mg/mL  Patient tolerance:  Patient tolerated the procedure well with no immediate complications

## 2022-10-25 RX ORDER — COLCHICINE 0.6 MG/1
0.6 TABLET ORAL
Qty: 30 TABLET | Refills: 0 | Status: SHIPPED | OUTPATIENT
Start: 2022-10-25

## 2023-02-17 RX ORDER — NAPROXEN 500 MG/1
500 TABLET ORAL 2 TIMES DAILY WITH MEALS
Qty: 60 TABLET | Refills: 1 | Status: ON HOLD | OUTPATIENT
Start: 2023-02-17 | End: 2023-10-05 | Stop reason: HOSPADM

## 2023-06-09 ENCOUNTER — TELEPHONE (OUTPATIENT)
Dept: SLEEP MEDICINE | Facility: CLINIC | Age: 53
End: 2023-06-09
Payer: MEDICARE

## 2023-06-12 ENCOUNTER — OFFICE VISIT (OUTPATIENT)
Dept: SLEEP MEDICINE | Facility: CLINIC | Age: 53
End: 2023-06-12
Payer: MEDICARE

## 2023-06-12 VITALS
HEIGHT: 73 IN | WEIGHT: 197.38 LBS | BODY MASS INDEX: 26.16 KG/M2 | OXYGEN SATURATION: 98 % | HEART RATE: 59 BPM | SYSTOLIC BLOOD PRESSURE: 130 MMHG | DIASTOLIC BLOOD PRESSURE: 80 MMHG

## 2023-06-12 DIAGNOSIS — G47.8 OTHER SLEEP DISORDERS: ICD-10-CM

## 2023-06-12 DIAGNOSIS — I10 ESSENTIAL HYPERTENSION: ICD-10-CM

## 2023-06-12 DIAGNOSIS — G47.30 SLEEP APNEA, UNSPECIFIED TYPE: Primary | ICD-10-CM

## 2023-06-12 DIAGNOSIS — G47.19 OTHER HYPERSOMNIA: ICD-10-CM

## 2023-06-12 PROCEDURE — 3079F DIAST BP 80-89 MM HG: CPT | Mod: CPTII,,, | Performed by: FAMILY MEDICINE

## 2023-06-12 PROCEDURE — 99203 OFFICE O/P NEW LOW 30 MIN: CPT | Mod: S$PBB | Performed by: FAMILY MEDICINE

## 2023-06-12 PROCEDURE — 3008F BODY MASS INDEX DOCD: CPT | Mod: CPTII,,, | Performed by: FAMILY MEDICINE

## 2023-06-12 PROCEDURE — 99999 PR STA SHADOW: CPT | Mod: PBBFAC,,, | Performed by: FAMILY MEDICINE

## 2023-06-12 PROCEDURE — 1159F MED LIST DOCD IN RCRD: CPT | Mod: CPTII,,, | Performed by: FAMILY MEDICINE

## 2023-06-12 PROCEDURE — 1159F PR MEDICATION LIST DOCUMENTED IN MEDICAL RECORD: ICD-10-PCS | Mod: CPTII,,, | Performed by: FAMILY MEDICINE

## 2023-06-12 PROCEDURE — 3075F SYST BP GE 130 - 139MM HG: CPT | Mod: CPTII,,, | Performed by: FAMILY MEDICINE

## 2023-06-12 PROCEDURE — 99999 PR STA SHADOW: ICD-10-PCS | Mod: PBBFAC,,, | Performed by: FAMILY MEDICINE

## 2023-06-12 PROCEDURE — 3079F PR MOST RECENT DIASTOLIC BLOOD PRESSURE 80-89 MM HG: ICD-10-PCS | Mod: CPTII,,, | Performed by: FAMILY MEDICINE

## 2023-06-12 PROCEDURE — 3008F PR BODY MASS INDEX (BMI) DOCUMENTED: ICD-10-PCS | Mod: CPTII,,, | Performed by: FAMILY MEDICINE

## 2023-06-12 PROCEDURE — 99214 OFFICE O/P EST MOD 30 MIN: CPT | Mod: PBBFAC | Performed by: FAMILY MEDICINE

## 2023-06-12 PROCEDURE — 3075F PR MOST RECENT SYSTOLIC BLOOD PRESS GE 130-139MM HG: ICD-10-PCS | Mod: CPTII,,, | Performed by: FAMILY MEDICINE

## 2023-06-12 RX ORDER — ASPIRIN 81 MG/1
81 TABLET ORAL DAILY
Status: ON HOLD | COMMUNITY
End: 2023-10-05 | Stop reason: HOSPADM

## 2023-06-12 NOTE — PROGRESS NOTES
Patient presents to clinic for initial eval. ESS is 7. Patient snores, falls asleep involuntarily, EDF, sudden attacks of muscle weakness with showing a lot of emotion, sleep paralysis, vivid dreams, restless legs, GERD, awakens with chest pain, awakens with shortness of breath, denies accident or near accident due to drowsy driving, up to bathroom 3-4 times per night.

## 2023-06-12 NOTE — PROGRESS NOTES
Subjective     Patient ID: Gary Moscoso is a 52 y.o. male.    Chief Complaint: No chief complaint on file.    Patient is seen in sleep clinic with a complaint of excessive daytime fatigue, loud snoring and witnessed apneas patient states it has been a problem for the last 2-4 years.  The patient is retired due to a previous heart attack and bilateral hip replacements.  The patient states he is usually in bed around 9-10 o'clock at night having difficulty going to sleep then will be up 4-5 times a night to urinate having some difficulty going back to sleep.  The patient will be up for the day between 5 and 6:00 a.m. waking unrefreshed.  Patient will occasionally wake with headaches as well as gasping episodes and night sweats.  The patient denies any problems with sleep paralysis or cataplexy but does have hypnagogic dreaming.    Review of Systems   Constitutional:  Positive for fatigue.   Respiratory:  Positive for apnea and choking. Negative for shortness of breath and wheezing.    Neurological:  Positive for headaches. Negative for memory loss.   Psychiatric/Behavioral:  Positive for sleep disturbance.  Denies restless legs, cataplexy, sleep paralysis, hypnagogic or hypnopompic hallucinations.  Admits to hypnagogic dreaming, night sweats, morning headaches, waking with gasping, frequent nocturia, difficulty with initiating and maintaining sleep.       Objective     Physical Exam  Vitals reviewed.   Constitutional:       General: He is not in acute distress.  HENT:      Head: Normocephalic.      Nose: Nose normal. No septal deviation.      Mouth/Throat:      Tongue: No lesions. Tongue does not deviate from midline.      Pharynx: Uvula midline.      Comments: Mallampati Class IV  Macroglossia, No Micrognathia, and No Retrognathia observed.     Neck:      Thyroid: No thyroid mass, thyromegaly or thyroid tenderness.      Vascular: No carotid bruit.      Comments: Thyroid midline without masses or  enlargement.  Cardiovascular:      Rate and Rhythm: Normal rate and regular rhythm.      Heart sounds: Normal heart sounds. No murmur heard.    No friction rub. No gallop.   Pulmonary:      Effort: Pulmonary effort is normal.      Breath sounds: Normal breath sounds.   Musculoskeletal:      Cervical back: Neck supple.      Right lower leg: Edema present.      Left lower leg: Edema present.   Lymphadenopathy:      Cervical:      Right cervical: No superficial or posterior cervical adenopathy.     Left cervical: No superficial or posterior cervical adenopathy.   Skin:     General: Skin is warm and dry.   Neurological:      Mental Status: He is alert and oriented to person, place, and time.   Psychiatric:         Attention and Perception: Attention normal.         Mood and Affect: Mood and affect normal.         Speech: Speech normal.         Behavior: Behavior normal. Behavior is cooperative.          Assessment and Plan     1. Sleep apnea, unspecified type    2. Other hypersomnia    3. Other sleep disorders    4. Essential hypertension      1. Polysomnography  2. Caution while operating machinery  3. Improve sleep hygiene     Follow up Following completion of sleep studies..

## 2023-07-06 ENCOUNTER — OFFICE VISIT (OUTPATIENT)
Dept: FAMILY MEDICINE | Facility: CLINIC | Age: 53
End: 2023-07-06
Payer: MEDICARE

## 2023-07-06 VITALS
TEMPERATURE: 98 F | SYSTOLIC BLOOD PRESSURE: 124 MMHG | RESPIRATION RATE: 20 BRPM | HEART RATE: 67 BPM | OXYGEN SATURATION: 99 % | WEIGHT: 197.81 LBS | HEIGHT: 73 IN | BODY MASS INDEX: 26.22 KG/M2 | DIASTOLIC BLOOD PRESSURE: 76 MMHG

## 2023-07-06 DIAGNOSIS — I10 ESSENTIAL HYPERTENSION: Primary | ICD-10-CM

## 2023-07-06 DIAGNOSIS — R79.9 ABNORMAL FINDING OF BLOOD CHEMISTRY, UNSPECIFIED: ICD-10-CM

## 2023-07-06 DIAGNOSIS — Z13.220 SCREENING FOR LIPID DISORDERS: ICD-10-CM

## 2023-07-06 DIAGNOSIS — F33.9 DEPRESSION, RECURRENT: ICD-10-CM

## 2023-07-06 DIAGNOSIS — Z12.5 SCREENING FOR PROSTATE CANCER: ICD-10-CM

## 2023-07-06 DIAGNOSIS — Z13.31 POSITIVE DEPRESSION SCREENING: ICD-10-CM

## 2023-07-06 DIAGNOSIS — K21.9 GASTROESOPHAGEAL REFLUX DISEASE WITHOUT ESOPHAGITIS: ICD-10-CM

## 2023-07-06 LAB
ALBUMIN SERPL BCP-MCNC: 4.3 G/DL (ref 3.5–5)
ALBUMIN/GLOB SERPL: 1.3 {RATIO}
ALP SERPL-CCNC: 102 U/L (ref 45–115)
ALT SERPL W P-5'-P-CCNC: 26 U/L (ref 16–61)
ANION GAP SERPL CALCULATED.3IONS-SCNC: 11 MMOL/L (ref 7–16)
AST SERPL W P-5'-P-CCNC: 18 U/L (ref 15–37)
BASOPHILS # BLD AUTO: 0.05 K/UL (ref 0–0.2)
BASOPHILS NFR BLD AUTO: 0.9 % (ref 0–1)
BILIRUB SERPL-MCNC: 0.9 MG/DL (ref ?–1.2)
BUN SERPL-MCNC: 15 MG/DL (ref 7–18)
BUN/CREAT SERPL: 14 (ref 6–20)
CALCIUM SERPL-MCNC: 9.2 MG/DL (ref 8.5–10.1)
CHLORIDE SERPL-SCNC: 107 MMOL/L (ref 98–107)
CHOLEST SERPL-MCNC: 172 MG/DL (ref 0–200)
CHOLEST/HDLC SERPL: 2 {RATIO}
CO2 SERPL-SCNC: 27 MMOL/L (ref 21–32)
CREAT SERPL-MCNC: 1.05 MG/DL (ref 0.7–1.3)
DIFFERENTIAL METHOD BLD: ABNORMAL
EGFR (NO RACE VARIABLE) (RUSH/TITUS): 85 ML/MIN/1.73M2
EOSINOPHIL # BLD AUTO: 0.11 K/UL (ref 0–0.5)
EOSINOPHIL NFR BLD AUTO: 1.9 % (ref 1–4)
ERYTHROCYTE [DISTWIDTH] IN BLOOD BY AUTOMATED COUNT: 13.2 % (ref 11.5–14.5)
GLOBULIN SER-MCNC: 3.4 G/DL (ref 2–4)
GLUCOSE SERPL-MCNC: 85 MG/DL (ref 74–106)
HCT VFR BLD AUTO: 44.1 % (ref 40–54)
HDLC SERPL-MCNC: 86 MG/DL (ref 40–60)
HGB BLD-MCNC: 14.9 G/DL (ref 13.5–18)
IMM GRANULOCYTES # BLD AUTO: 0.01 K/UL (ref 0–0.04)
IMM GRANULOCYTES NFR BLD: 0.2 % (ref 0–0.4)
LDLC SERPL CALC-MCNC: 71 MG/DL
LYMPHOCYTES # BLD AUTO: 2.18 K/UL (ref 1–4.8)
LYMPHOCYTES NFR BLD AUTO: 37.9 % (ref 27–41)
MCH RBC QN AUTO: 29.7 PG (ref 27–31)
MCHC RBC AUTO-ENTMCNC: 33.8 G/DL (ref 32–36)
MCV RBC AUTO: 87.8 FL (ref 80–96)
MONOCYTES # BLD AUTO: 0.56 K/UL (ref 0–0.8)
MONOCYTES NFR BLD AUTO: 9.7 % (ref 2–6)
MPC BLD CALC-MCNC: 9.3 FL (ref 9.4–12.4)
NEUTROPHILS # BLD AUTO: 2.84 K/UL (ref 1.8–7.7)
NEUTROPHILS NFR BLD AUTO: 49.4 % (ref 53–65)
NONHDLC SERPL-MCNC: 86 MG/DL
NRBC # BLD AUTO: 0 X10E3/UL
NRBC, AUTO (.00): 0 %
PLATELET # BLD AUTO: 291 K/UL (ref 150–400)
POTASSIUM SERPL-SCNC: 3.8 MMOL/L (ref 3.5–5.1)
PROT SERPL-MCNC: 7.7 G/DL (ref 6.4–8.2)
PSA SERPL-MCNC: 6.24 NG/ML
RBC # BLD AUTO: 5.02 M/UL (ref 4.6–6.2)
SODIUM SERPL-SCNC: 141 MMOL/L (ref 136–145)
TRIGL SERPL-MCNC: 73 MG/DL (ref 35–150)
VLDLC SERPL-MCNC: 15 MG/DL
WBC # BLD AUTO: 5.75 K/UL (ref 4.5–11)

## 2023-07-06 PROCEDURE — 1159F MED LIST DOCD IN RCRD: CPT | Mod: ,,, | Performed by: NURSE PRACTITIONER

## 2023-07-06 PROCEDURE — 99214 PR OFFICE/OUTPT VISIT, EST, LEVL IV, 30-39 MIN: ICD-10-PCS | Mod: ,,, | Performed by: NURSE PRACTITIONER

## 2023-07-06 PROCEDURE — 80053 COMPREHEN METABOLIC PANEL: CPT | Mod: ,,, | Performed by: CLINICAL MEDICAL LABORATORY

## 2023-07-06 PROCEDURE — 3078F DIAST BP <80 MM HG: CPT | Mod: ,,, | Performed by: NURSE PRACTITIONER

## 2023-07-06 PROCEDURE — 3008F PR BODY MASS INDEX (BMI) DOCUMENTED: ICD-10-PCS | Mod: ,,, | Performed by: NURSE PRACTITIONER

## 2023-07-06 PROCEDURE — 99214 OFFICE O/P EST MOD 30 MIN: CPT | Mod: ,,, | Performed by: NURSE PRACTITIONER

## 2023-07-06 PROCEDURE — 80053 COMPREHENSIVE METABOLIC PANEL: ICD-10-PCS | Mod: ,,, | Performed by: CLINICAL MEDICAL LABORATORY

## 2023-07-06 PROCEDURE — 1160F PR REVIEW ALL MEDS BY PRESCRIBER/CLIN PHARMACIST DOCUMENTED: ICD-10-PCS | Mod: ,,, | Performed by: NURSE PRACTITIONER

## 2023-07-06 PROCEDURE — 3008F BODY MASS INDEX DOCD: CPT | Mod: ,,, | Performed by: NURSE PRACTITIONER

## 2023-07-06 PROCEDURE — 80061 LIPID PANEL: ICD-10-PCS | Mod: ,,, | Performed by: CLINICAL MEDICAL LABORATORY

## 2023-07-06 PROCEDURE — G0103 PSA, SCREENING: ICD-10-PCS | Mod: ,,, | Performed by: CLINICAL MEDICAL LABORATORY

## 2023-07-06 PROCEDURE — 80061 LIPID PANEL: CPT | Mod: ,,, | Performed by: CLINICAL MEDICAL LABORATORY

## 2023-07-06 PROCEDURE — 1159F PR MEDICATION LIST DOCUMENTED IN MEDICAL RECORD: ICD-10-PCS | Mod: ,,, | Performed by: NURSE PRACTITIONER

## 2023-07-06 PROCEDURE — 85025 COMPLETE CBC W/AUTO DIFF WBC: CPT | Mod: ,,, | Performed by: CLINICAL MEDICAL LABORATORY

## 2023-07-06 PROCEDURE — G0103 PSA SCREENING: HCPCS | Mod: ,,, | Performed by: CLINICAL MEDICAL LABORATORY

## 2023-07-06 PROCEDURE — 1160F RVW MEDS BY RX/DR IN RCRD: CPT | Mod: ,,, | Performed by: NURSE PRACTITIONER

## 2023-07-06 PROCEDURE — 85025 CBC WITH DIFFERENTIAL: ICD-10-PCS | Mod: ,,, | Performed by: CLINICAL MEDICAL LABORATORY

## 2023-07-06 PROCEDURE — 3078F PR MOST RECENT DIASTOLIC BLOOD PRESSURE < 80 MM HG: ICD-10-PCS | Mod: ,,, | Performed by: NURSE PRACTITIONER

## 2023-07-06 PROCEDURE — 3074F SYST BP LT 130 MM HG: CPT | Mod: ,,, | Performed by: NURSE PRACTITIONER

## 2023-07-06 PROCEDURE — 3074F PR MOST RECENT SYSTOLIC BLOOD PRESSURE < 130 MM HG: ICD-10-PCS | Mod: ,,, | Performed by: NURSE PRACTITIONER

## 2023-07-06 RX ORDER — TADALAFIL 20 MG/1
20 TABLET ORAL DAILY PRN
COMMUNITY
Start: 2023-04-10

## 2023-07-06 RX ORDER — CETIRIZINE HYDROCHLORIDE 10 MG/1
10 TABLET ORAL DAILY
Qty: 30 TABLET | Refills: 5 | Status: SHIPPED | OUTPATIENT
Start: 2023-07-06

## 2023-07-06 RX ORDER — CARVEDILOL 12.5 MG/1
12.5 TABLET ORAL 2 TIMES DAILY
COMMUNITY
Start: 2023-06-16

## 2023-07-06 RX ORDER — AMITRIPTYLINE HYDROCHLORIDE 50 MG/1
50 TABLET, FILM COATED ORAL NIGHTLY
Qty: 30 TABLET | Refills: 11 | Status: SHIPPED | OUTPATIENT
Start: 2023-07-06 | End: 2023-09-06

## 2023-07-06 RX ORDER — GABAPENTIN 100 MG/1
100 CAPSULE ORAL 3 TIMES DAILY
Qty: 90 CAPSULE | Refills: 11 | Status: SHIPPED | OUTPATIENT
Start: 2023-07-06 | End: 2023-11-06 | Stop reason: SDUPTHER

## 2023-07-06 RX ORDER — POTASSIUM CHLORIDE 20 MEQ/1
20 TABLET, EXTENDED RELEASE ORAL 2 TIMES DAILY
COMMUNITY
Start: 2023-05-14

## 2023-07-06 RX ORDER — ESOMEPRAZOLE MAGNESIUM 20 MG/1
20 GRANULE, DELAYED RELEASE ORAL EVERY OTHER DAY
Qty: 30 EACH | Refills: 3 | Status: SHIPPED | OUTPATIENT
Start: 2023-07-06 | End: 2023-10-27 | Stop reason: SDUPTHER

## 2023-07-06 NOTE — ASSESSMENT & PLAN NOTE
Will start on Elavil to hopefully help his depression as well as help him sleep at night. He is to follow up in 1 month to discuss effectiveness. Denies suicidal/homicidal ideations.

## 2023-07-06 NOTE — ASSESSMENT & PLAN NOTE
Blood pressure well controlled. Continue current medications. Follow up in 3 months or as needed.   Follow up with cardiology as scheduled.

## 2023-07-06 NOTE — PROGRESS NOTES
Brit Obando NP   [id number] [facility] [address]  [place]  [address]      PATIENT NAME: Gary Moscoso  : 1970  DATE: 23  MRN: [id number]      Billing Provider: Brit Obando NP  Level of Service: [place] OFFICE/OUTPT VISIT, EST, LEVL IV, 30-39 MIN  Patient PCP Information       Provider PCP Type    YAQUELIN [facility]            Reason for Visit / Chief Complaint: office visit         History of Present Illness / Problem Focused Workflow     Gary Moscoso presents to the clinic with office visit     52 year old male presents to clinic for check up and medication refill. Upon rooming patient depression scale was completed and PHQ2 score was 6. Patient reported feeling down and depressed, fatigued and tired often. States he does not sleep well at night. Has difficulty falling asleep due to he worries about things and then often awakens in the middle of the night. He does report he has a sleep study scheduled for next month.       Review of Systems     @Review of Systems   Constitutional:  Negative for activity change, appetite change, fatigue and fever.   HENT:  Negative for nasal congestion, ear pain, rhinorrhea, sinus pressure/congestion and sore throat.    Eyes:  Negative for pain, redness, visual disturbance and eye dryness.   Respiratory:  Negative for cough and shortness of breath.    Cardiovascular:  Negative for chest pain and leg swelling.   Gastrointestinal:  Negative for abdominal distention, abdominal pain, constipation and diarrhea.   Endocrine: Negative for cold intolerance, heat intolerance and polyuria.   Genitourinary:  Negative for bladder incontinence, dysuria, frequency and urgency.   Musculoskeletal:  Negative for arthralgias, gait problem and myalgias.   Integumentary:  Negative for color change, rash and wound.   Allergic/Immunologic: Negative for environmental allergies and food allergies.   Neurological:  Negative for dizziness, weakness,  light-headedness and headaches.   Psychiatric/Behavioral:  Positive for dysphoric mood and sleep disturbance. Negative for behavioral problems.      Medical / Social / Family History     Past Medical History:   Diagnosis Date    Aseptic necrosis of head of humerus 09/27/2013    Cyst of epididymis 02/18/2021    Degeneration of lumbar intervertebral disc 03/24/2013    Encounter for examination for driving license 08/06/2018    Hypertension     Hypokalemia     Lateral epicondylitis, left elbow 06/01/2020    Non-diabetic hypoglycemia 01/10/2012    Presence of right artificial hip joint 02/04/2016    Prolapsed cervical intervertebral disc 05/09/2013    without myelopathy       Past Surgical History:   Procedure Laterality Date    ARTHROSCOPY, HIP Left 9/20/2021    Procedure: ARTHROSCOPY, HIP, WITH LABRUM REPAIR;  Surgeon: Charles Yeh MD;  Location: [facility] OR;  Service: Orthopedics;  Laterality: Left;    ARTHROSCOPY, HIP Left 9/20/2021    Procedure: ARTHROSCOPY, HIP, WITH FEMOROPLASTY;  Surgeon: Charles Yeh MD;  Location: [facility] OR;  Service: Orthopedics;  Laterality: Left;    ARTHROSCOPY, HIP Left 9/20/2021    Procedure: ARTHROSCOPY, HIP, WITH ACETABULOPLASTY, WITH REPAIR OF LABRUM IF INDICATED;  Surgeon: Charles Yeh MD;  Location: [facility] OR;  Service: Orthopedics;  Laterality: Left;    CARPAL TUNNEL RELEASE      DIAGNOSTIC LAPAROSCOPY N/A 3/15/2021    Procedure: LAPAROSCOPY, DIAGNOSTIC;  Surgeon: Jay Nolen MD;  Location: [facility] OR;  Service: General;  Laterality: N/A;  1052 FAMILY INFORMED OF SURGERY START  1130 DR NOLEN TALKED TO PATIENT FAMILY    HIP SURGERY      inguinal hernia reparir  03/05/2021    Dr. Robert Nolen    ROBOT-ASSISTED LAPAROSCOPIC REPAIR OF INGUINAL HERNIA USING DA JALEN XI Bilateral 3/15/2021    Procedure: XI ROBOTIC INGUINAL HERNIA REPAIRS WITH MESH;  Surgeon: Jay Nolen MD;  Location: [facility] OR;  Service: General;  Laterality: Bilateral;    SHOULDER  SURGERY      VARICOSE VEIN SURGERY         Social History    reports that he has never smoked. He has never been exposed to tobacco smoke. He has never used smokeless tobacco. He reports that he does not currently use alcohol. He reports that he does not currently use drugs.    Family History  's family history includes Colon cancer in his mother; Diabetes Mellitus in his father; Heart disease in his father; Hypertension in his father and mother.    Medications and Allergies     Medications  Outpatient Medications Marked as Taking for the 7/6/23 encounter (Office Visit) with Brit Obando NP   Medication Sig Dispense Refill    ascorbic acid, vitamin C, (VITAMIN C) 100 MG tablet Take 100 mg by mouth once daily.      aspirin (ECOTRIN) 81 MG EC tablet Take 81 mg by mouth once daily.      CARTIA  mg 24 hr capsule Take 240 mg by mouth once daily.      carvediloL (COREG) 12.5 MG tablet Take 12.5 mg by mouth 2 (two) times daily.      colchicine (COLCRYS) 0.6 mg tablet Take 1 tablet (0.6 mg total) by mouth as needed (gout flare up). 30 tablet 0    ipratropium (ATROVENT) 42 mcg (0.06 %) nasal spray 2 sprays by Nasal route 4 (four) times daily. 15 mL 5    magnesium aspartate HCl 61 mg (615 mg) TbEC Take by mouth once.      naproxen (NAPROSYN) 500 MG tablet Take 1 tablet (500 mg total) by mouth 2 (two) times daily with meals. 60 tablet 1    neomycin-polymyxin-dexamethasone (DEXACINE) 3.5 mg/g-10,000 unit/g-0.1 % Oint SMARTSIG:Sparingly In Eye(s) Every Night      NIFEdipine (ADALAT CC) 30 MG TbSR Take 30 mg by mouth 2 (two) times a day.      potassium chloride SA (K-DUR,KLOR-CON) 20 MEQ tablet Take 20 mEq by mouth.      spironolactone (ALDACTONE) 25 MG tablet Take 25 mg by mouth once daily.      tadalafiL (CIALIS) 20 MG Tab Take 20 mg by mouth daily as needed.      traMADoL (ULTRAM) 50 mg tablet Take 1 tablet (50 mg total) by mouth every 6 (six) hours as needed for Pain. 30 tablet 0    XIIDRA 5 % Dpet Instill 1  drop into both eyes twice a day as directed      [DISCONTINUED] cetirizine (ZYRTEC) 10 MG tablet Take 1 tablet (10 mg total) by mouth once daily. 30 tablet 5    [DISCONTINUED] esomeprazole (NEXIUM) 20 mg GrPS Take 20 mg by mouth every other day.         Allergies  Review of patient's allergies indicates:   Allergen Reactions    Ace inhibitors Swelling    Lisinopril Anaphylaxis    Codeine Nausea And Vomiting    Mobic [meloxicam] Itching       Physical Examination     Vitals:    07/06/23 0915   BP: 124/76   Pulse: 67   Resp: 20   Temp: 98 °F (36.7 °C)     Physical Exam  Vitals and nursing note reviewed.   HENT:      Head: Normocephalic.      Right Ear: Tympanic membrane normal.      Left Ear: Tympanic membrane normal.      Nose: Nose normal.      Mouth/Throat:      Mouth: Mucous membranes are moist.      Pharynx: Oropharynx is clear. No posterior oropharyngeal erythema.   Eyes:      Conjunctiva/sclera: Conjunctivae normal.   Cardiovascular:      Rate and Rhythm: Normal rate and regular rhythm.      Pulses: Normal pulses.      Heart sounds: Normal heart sounds.   Pulmonary:      Effort: Pulmonary effort is normal.      Breath sounds: Normal breath sounds.   Abdominal:      General: Abdomen is flat. Bowel sounds are normal. There is no distension.      Palpations: Abdomen is soft.   Musculoskeletal:         General: No swelling or tenderness. Normal range of motion.      Cervical back: Normal range of motion.      Right lower leg: No edema.      Left lower leg: No edema.   Skin:     General: Skin is warm and dry.      Capillary Refill: Capillary refill takes less than 2 seconds.   Neurological:      Mental Status: He is alert. Mental status is at baseline.   Psychiatric:         Mood and Affect: Mood is depressed.         Behavior: Behavior normal.             Lab Results   Component Value Date    WBC 5.21 01/24/2022    HGB 14.7 01/24/2022    HCT 42.8 01/24/2022    MCV 86.8 01/24/2022     01/24/2022         CMP  Sodium   Date Value Ref Range Status   08/09/2021 142 136 - 145 mmol/L Final     Potassium   Date Value Ref Range Status   08/09/2021 3.7 3.5 - 5.1 mmol/L Final     Chloride   Date Value Ref Range Status   08/09/2021 109 (H) 98 - 107 mmol/L Final     CO2   Date Value Ref Range Status   08/09/2021 28 21 - 32 mmol/L Final     Glucose   Date Value Ref Range Status   08/09/2021 108 (H) 74 - 106 mg/dL Final     BUN   Date Value Ref Range Status   08/09/2021 16 7 - 18 mg/dL Final     Creatinine   Date Value Ref Range Status   08/09/2021 1.04 0.70 - 1.30 mg/dL Final     Calcium   Date Value Ref Range Status   08/09/2021 8.8 8.5 - 10.1 mg/dL Final     Anion Gap   Date Value Ref Range Status   08/09/2021 9 7 - 16 mmol/L Final     Procedures   Assessment and Plan (including Health Maintenance)   :    Plan:           Problem List Items Addressed This Visit          Psychiatric    Depression, recurrent    Current Assessment & Plan     Will start on Elavil to hopefully help his depression as well as help him sleep at night. He is to follow up in 1 month to discuss effectiveness. Denies suicidal/homicidal ideations.          Relevant Medications    amitriptyline (ELAVIL) 50 MG tablet       Cardiac/Vascular    Essential hypertension - Primary    Current Assessment & Plan     Blood pressure well controlled. Continue current medications. Follow up in 3 months or as needed.   Follow up with cardiology as scheduled.          Relevant Medications    carvediloL (COREG) 12.5 MG tablet    Other Relevant Orders    CBC Auto Differential    Comprehensive Metabolic Panel       GI    Gastroesophageal reflux disease without esophagitis    Current Assessment & Plan     Well controlled on Nexium. Continue at current dosage. Follow up in 3 months or as needed.          Relevant Medications    esomeprazole (NEXIUM) 20 mg GrPS     Other Visit Diagnoses       Screening for lipid disorders        Relevant Orders    Lipid Panel    Screening for  prostate cancer        Relevant Orders    PSA, Screening    Positive depression screening        I have reviewed the positive depression score which warrants active treatment with psychotherapy and/or medications.    Abnormal finding of blood chemistry, unspecified        Relevant Orders    Lipid Panel            Health Maintenance Topics with due status: Not Due       Topic Last Completion Date    Colorectal Cancer Screening 06/24/2021    Influenza Vaccine Not Due       Future Appointments   Date Time Provider Department Center   8/1/2023  7:30 PM [facility]        Health Maintenance Due   Topic Date Due    Hepatitis C Screening  Never done    Lipid Panel  Never done    HIV Screening  Never done    TETANUS VACCINE  Never done    Hemoglobin A1c (Diabetic Prevention Screening)  Never done    Shingles Vaccine (1 of 2) Never done    COVID-19 Vaccine (3 - Moderna series) 04/22/2021        No follow-ups on file.     Signature:  Brit Obando NP  [address] [place]  [address]    Date of encounter: 7/6/23

## 2023-07-06 NOTE — PROGRESS NOTES
I have used clinical judgement based on duration and functional status to consider definite necessity for treatment. PHQ2 score 6. Discussed with patient who states he would like to try medication to help his depression.

## 2023-07-07 NOTE — PROGRESS NOTES
Continue low fat/low cholesterol diet. Cholesterol looks good. His PSA was elevated. We will refer him to Urology for further eval.

## 2023-07-09 DIAGNOSIS — Z71.89 COMPLEX CARE COORDINATION: ICD-10-CM

## 2023-07-10 DIAGNOSIS — R97.20 ELEVATED PSA: Primary | ICD-10-CM

## 2023-07-10 NOTE — PROGRESS NOTES
Patient notified of results and instructions with understanding verbalized.  Patient reports that he does not have a preference in providers but wishes to go to [place] for the Urology referral.

## 2023-08-01 ENCOUNTER — PROCEDURE VISIT (OUTPATIENT)
Dept: SLEEP MEDICINE | Facility: HOSPITAL | Age: 53
End: 2023-08-01
Payer: MEDICARE

## 2023-08-01 DIAGNOSIS — G47.31 CSA (CENTRAL SLEEP APNEA): Primary | ICD-10-CM

## 2023-08-01 DIAGNOSIS — G47.19 OTHER HYPERSOMNIA: ICD-10-CM

## 2023-08-01 DIAGNOSIS — G47.30 SLEEP APNEA, UNSPECIFIED TYPE: ICD-10-CM

## 2023-08-01 DIAGNOSIS — G47.8 OTHER SLEEP DISORDERS: ICD-10-CM

## 2023-08-01 PROCEDURE — 95810 POLYSOM 6/> YRS 4/> PARAM: CPT | Mod: PO

## 2023-08-01 PROCEDURE — 95810 POLYSOM 6/> YRS 4/> PARAM: CPT | Mod: 26,PO | Performed by: FAMILY MEDICINE

## 2023-08-09 DIAGNOSIS — G47.31 CENTRAL SLEEP APNEA: Primary | ICD-10-CM

## 2023-08-09 NOTE — PROGRESS NOTES
Per Dr. Mera request on polysomnography interpretation (08/02/2023), order cardio and neuro consults. Spoke with patient, his cardiologist is Dr. Gaviria at [facility]. Study faxed to his office. Added order in [facility] for ambulatory consult to [facility].

## 2023-08-10 ENCOUNTER — OFFICE VISIT (OUTPATIENT)
Dept: NEUROLOGY | Facility: CLINIC | Age: 53
End: 2023-08-10
Payer: MEDICARE

## 2023-08-10 VITALS
RESPIRATION RATE: 18 BRPM | BODY MASS INDEX: 26.13 KG/M2 | HEIGHT: 73 IN | OXYGEN SATURATION: 98 % | SYSTOLIC BLOOD PRESSURE: 120 MMHG | DIASTOLIC BLOOD PRESSURE: 84 MMHG | WEIGHT: 197.13 LBS | HEART RATE: 64 BPM

## 2023-08-10 DIAGNOSIS — G47.31 CENTRAL SLEEP APNEA: Primary | ICD-10-CM

## 2023-08-10 DIAGNOSIS — R42 DIZZINESS AND GIDDINESS: ICD-10-CM

## 2023-08-10 PROCEDURE — 3079F DIAST BP 80-89 MM HG: CPT | Mod: CPTII,,, | Performed by: PSYCHIATRY & NEUROLOGY

## 2023-08-10 PROCEDURE — 3074F SYST BP LT 130 MM HG: CPT | Mod: CPTII,,, | Performed by: PSYCHIATRY & NEUROLOGY

## 2023-08-10 PROCEDURE — 99204 OFFICE O/P NEW MOD 45 MIN: CPT | Mod: S$PBB,,, | Performed by: PSYCHIATRY & NEUROLOGY

## 2023-08-10 PROCEDURE — 3008F PR BODY MASS INDEX (BMI) DOCUMENTED: ICD-10-PCS | Mod: CPTII,,, | Performed by: PSYCHIATRY & NEUROLOGY

## 2023-08-10 PROCEDURE — 3008F BODY MASS INDEX DOCD: CPT | Mod: CPTII,,, | Performed by: PSYCHIATRY & NEUROLOGY

## 2023-08-10 PROCEDURE — 1159F MED LIST DOCD IN RCRD: CPT | Mod: CPTII,,, | Performed by: PSYCHIATRY & NEUROLOGY

## 2023-08-10 PROCEDURE — 1160F PR REVIEW ALL MEDS BY PRESCRIBER/CLIN PHARMACIST DOCUMENTED: ICD-10-PCS | Mod: CPTII,,, | Performed by: PSYCHIATRY & NEUROLOGY

## 2023-08-10 PROCEDURE — 1160F RVW MEDS BY RX/DR IN RCRD: CPT | Mod: CPTII,,, | Performed by: PSYCHIATRY & NEUROLOGY

## 2023-08-10 PROCEDURE — 3079F PR MOST RECENT DIASTOLIC BLOOD PRESSURE 80-89 MM HG: ICD-10-PCS | Mod: CPTII,,, | Performed by: PSYCHIATRY & NEUROLOGY

## 2023-08-10 PROCEDURE — 99204 PR OFFICE/OUTPT VISIT, NEW, LEVL IV, 45-59 MIN: ICD-10-PCS | Mod: S$PBB,,, | Performed by: PSYCHIATRY & NEUROLOGY

## 2023-08-10 PROCEDURE — 3074F PR MOST RECENT SYSTOLIC BLOOD PRESSURE < 130 MM HG: ICD-10-PCS | Mod: CPTII,,, | Performed by: PSYCHIATRY & NEUROLOGY

## 2023-08-10 PROCEDURE — 1159F PR MEDICATION LIST DOCUMENTED IN MEDICAL RECORD: ICD-10-PCS | Mod: CPTII,,, | Performed by: PSYCHIATRY & NEUROLOGY

## 2023-08-10 PROCEDURE — 99215 OFFICE O/P EST HI 40 MIN: CPT | Mod: PBBFAC | Performed by: PSYCHIATRY & NEUROLOGY

## 2023-08-10 NOTE — PATIENT INSTRUCTIONS
MRI brain r/o pathology  Recommend CPAP/BiPAP as indicated per polysomnogram to improve central sleep apnea.  Avoid excess medications and polypharmacy especially narcotics   Keep regimented and scheduled sleep habits  Follow-up with cardiology eval to rule out cardiogenic source of sleep apnea.  F/u 4 weeks

## 2023-08-10 NOTE — PROGRESS NOTES
Subjective:       Patient ID: Gary Moscoso is a 52 y.o. male     Chief Complaint:    Chief Complaint   Patient presents with    Sleep Apnea        Allergies:  Ace inhibitors, Lisinopril, Codeine, and Mobic [meloxicam]    Current Medications:    Outpatient Encounter Medications as of 8/10/2023   Medication Sig Dispense Refill    amitriptyline (ELAVIL) 50 MG tablet Take 1 tablet (50 mg total) by mouth every evening. 30 tablet 11    ascorbic acid, vitamin C, (VITAMIN C) 100 MG tablet Take 100 mg by mouth once daily.      aspirin (ECOTRIN) 81 MG EC tablet Take 81 mg by mouth once daily.      CARTIA  mg 24 hr capsule Take 240 mg by mouth once daily.      carvediloL (COREG) 12.5 MG tablet Take 12.5 mg by mouth 2 (two) times daily.      cetirizine (ZYRTEC) 10 MG tablet Take 1 tablet (10 mg total) by mouth once daily. 30 tablet 5    colchicine (COLCRYS) 0.6 mg tablet Take 1 tablet (0.6 mg total) by mouth as needed (gout flare up). 30 tablet 0    esomeprazole (NEXIUM) 20 mg GrPS Take 20 mg by mouth every other day. 30 each 3    gabapentin (NEURONTIN) 100 MG capsule Take 1 capsule (100 mg total) by mouth 3 (three) times daily. 90 capsule 11    ipratropium (ATROVENT) 42 mcg (0.06 %) nasal spray 2 sprays by Nasal route 4 (four) times daily. 15 mL 5    magnesium aspartate HCl 61 mg (615 mg) TbEC Take by mouth once.      naproxen (NAPROSYN) 500 MG tablet Take 1 tablet (500 mg total) by mouth 2 (two) times daily with meals. 60 tablet 1    neomycin-polymyxin-dexamethasone (DEXACINE) 3.5 mg/g-10,000 unit/g-0.1 % Oint SMARTSIG:Sparingly In Eye(s) Every Night      NIFEdipine (ADALAT CC) 30 MG TbSR Take 30 mg by mouth 2 (two) times a day.      potassium chloride SA (K-DUR,KLOR-CON) 20 MEQ tablet Take 20 mEq by mouth.      spironolactone (ALDACTONE) 25 MG tablet Take 25 mg by mouth once daily.      tadalafiL (CIALIS) 20 MG Tab Take 20 mg by mouth daily as needed.      traMADoL (ULTRAM) 50 mg tablet Take 1 tablet (50 mg  total) by mouth every 6 (six) hours as needed for Pain. 30 tablet 0    XIIDRA 5 % Dpet Instill 1 drop into both eyes twice a day as directed       No facility-administered encounter medications on file as of 8/10/2023.       History of Present Illness  52-year-old black male referred to clinic for evaluation of central sleep apnea-reportedly patient had a recent polysomnogram although I do not have any records for review and reportedly patient started on a CPAP/BiPAP.  Patient had been complaining of increased daytime somnolence and fatigue, difficulty falling asleep, heavy snoring, vivid dreams, increased nighttime worry secondary to anxiety and having to get up a few times to use the restroom.   Medication effects are often the culprit for central sleep apnea but patient denies any previous narcotic medications though he is on several medications.  I believe he has a Cardiology evaluation pending for cardiogenic sources of apnea.  He has known bradycardia and bordering hypotension and has been following Cards.  There is little to offer from Neuro perspective but will get MRI brain to r/o pathology             Past Medical History:   Diagnosis Date    Aseptic necrosis of head of humerus 09/27/2013    Cyst of epididymis 02/18/2021    Degeneration of lumbar intervertebral disc 03/24/2013    Encounter for examination for driving license 08/06/2018    Hypertension     Hypokalemia     Lateral epicondylitis, left elbow 06/01/2020    Non-diabetic hypoglycemia 01/10/2012    Presence of right artificial hip joint 02/04/2016    Prolapsed cervical intervertebral disc 05/09/2013    without myelopathy       Past Surgical History:   Procedure Laterality Date    ARTHROSCOPY, HIP Left 9/20/2021    Procedure: ARTHROSCOPY, HIP, WITH LABRUM REPAIR;  Surgeon: Charles Yeh MD;  Location: [facility];  Service: Orthopedics;  Laterality: Left;    ARTHROSCOPY, HIP Left 9/20/2021    Procedure: ARTHROSCOPY, HIP, WITH FEMOROPLASTY;   "Surgeon: Charles Yeh MD;  Location: [facility] OR;  Service: Orthopedics;  Laterality: Left;    ARTHROSCOPY, HIP Left 9/20/2021    Procedure: ARTHROSCOPY, HIP, WITH ACETABULOPLASTY, WITH REPAIR OF LABRUM IF INDICATED;  Surgeon: Charles Yeh MD;  Location: [facility] OR;  Service: Orthopedics;  Laterality: Left;    CARPAL TUNNEL RELEASE      DIAGNOSTIC LAPAROSCOPY N/A 3/15/2021    Procedure: LAPAROSCOPY, DIAGNOSTIC;  Surgeon: Jay Nolen MD;  Location: [facility] OR;  Service: General;  Laterality: N/A;  1052 FAMILY INFORMED OF SURGERY START  1130 DR NOLEN TALKED TO PATIENT FAMILY    HIP SURGERY      inguinal hernia reparir  03/05/2021    Dr. Robert Nolen    ROBOT-ASSISTED LAPAROSCOPIC REPAIR OF INGUINAL HERNIA USING DA JALEN XI Bilateral 3/15/2021    Procedure: XI ROBOTIC INGUINAL HERNIA REPAIRS WITH MESH;  Surgeon: Jay Nolen MD;  Location: [facility] OR;  Service: General;  Laterality: Bilateral;    SHOULDER SURGERY      VARICOSE VEIN SURGERY         Social History  Mr. Moscoso  reports that he has never smoked. He has never been exposed to tobacco smoke. He has never used smokeless tobacco. He reports that he does not currently use alcohol. He reports that he does not currently use drugs.    Family History  's [place] family history includes Colon cancer in his mother; Diabetes Mellitus in his father; Heart disease in his father; Hypertension in his father and mother.    Review of Systems  Review of Systems   Psychiatric/Behavioral:  Positive for depression. The patient is nervous/anxious and has insomnia.    All other systems reviewed and are negative.     Objective:   /84 (BP Location: Left arm, Patient Position: Sitting, BP Method: Large (Manual))   Pulse 64   Resp 18   Ht 6' 1" (1.854 m)   Wt 89.4 kg (197 lb 1.9 oz)   SpO2 98%   BMI 26.01 kg/m²    NEUROLOGICAL EXAMINATION:     MENTAL STATUS   Oriented to person, place, and time.   Level of consciousness: drowsy  Knowledge: good.     "    Depression anxiety     CRANIAL NERVES   Cranial nerves II through XII intact.     MOTOR EXAM     Strength   Strength 5/5 throughout.     GAIT AND COORDINATION     Gait  Gait: normal       Physical Exam  Vitals reviewed.   Constitutional:       Appearance: He is normal weight.   Neurological:      General: No focal deficit present.      Mental Status: He is alert and oriented to person, place, and time. Mental status is at baseline.      Cranial Nerves: Cranial nerves 2-12 are intact.      Motor: Motor strength is normal.     Gait: Gait is intact.          Assessment:     Central sleep apnea  Comments:  Recent diagnosis central sleep apnea although I have no actual records of polysomnogram for review  Orders:  -     Ambulatory referral/consult to Neurology         Primary Diagnosis and ICD10  Central sleep apnea [G47.31]    Plan:     Patient Instructions   Recommend CPAP/BiPAP as indicated per polysomnogram to improve central sleep apnea.  Avoid excess medications and polypharmacy especially narcotics   Keep regimented and scheduled sleep habits  Follow-up with cardiology eval to rule out cardiogenic source of sleep apnea.    There are no discontinued medications.    Requested Prescriptions      No prescriptions requested or ordered in this encounter

## 2023-08-15 ENCOUNTER — PROCEDURE VISIT (OUTPATIENT)
Dept: SLEEP MEDICINE | Facility: HOSPITAL | Age: 53
End: 2023-08-15
Attending: FAMILY MEDICINE
Payer: MEDICARE

## 2023-08-15 DIAGNOSIS — G47.31 CSA (CENTRAL SLEEP APNEA): Primary | ICD-10-CM

## 2023-08-15 PROCEDURE — 95811 POLYSOM 6/>YRS CPAP 4/> PARM: CPT | Mod: 26,PO | Performed by: FAMILY MEDICINE

## 2023-08-15 PROCEDURE — 95811 POLYSOM 6/>YRS CPAP 4/> PARM: CPT | Mod: PO

## 2023-08-16 NOTE — PROCEDURES
Procedures  The tech report is part of the sleep summary and will be uploaded into [employer].   Yes

## 2023-08-22 ENCOUNTER — HOSPITAL ENCOUNTER (OUTPATIENT)
Dept: RADIOLOGY | Facility: HOSPITAL | Age: 53
Discharge: HOME | End: 2023-08-22
Attending: PSYCHIATRY & NEUROLOGY
Payer: MEDICARE

## 2023-08-22 DIAGNOSIS — R42 DIZZINESS AND GIDDINESS: ICD-10-CM

## 2023-08-22 PROCEDURE — 70553 MRI BRAIN STEM W/O & W/DYE: CPT | Mod: 26,,, | Performed by: STUDENT IN AN ORGANIZED HEALTH CARE EDUCATION/TRAINING PROGRAM

## 2023-08-22 PROCEDURE — 70553 MRI BRAIN STEM W/O & W/DYE: CPT | Mod: TC

## 2023-08-22 PROCEDURE — A9577 INJ MULTIHANCE: HCPCS | Performed by: PSYCHIATRY & NEUROLOGY

## 2023-08-22 PROCEDURE — 70553 MRI BRAIN W WO CONTRAST: ICD-10-PCS | Mod: 26,,, | Performed by: STUDENT IN AN ORGANIZED HEALTH CARE EDUCATION/TRAINING PROGRAM

## 2023-08-22 PROCEDURE — 25500020 PHARM REV CODE 255: Performed by: PSYCHIATRY & NEUROLOGY

## 2023-08-22 RX ADMIN — GADOBENATE DIMEGLUMINE 18 ML: 529 INJECTION, SOLUTION INTRAVENOUS at 12:08

## 2023-08-28 ENCOUNTER — OFFICE VISIT (OUTPATIENT)
Dept: FAMILY MEDICINE | Facility: CLINIC | Age: 53
End: 2023-08-28
Payer: MEDICARE

## 2023-08-28 VITALS
OXYGEN SATURATION: 100 % | RESPIRATION RATE: 18 BRPM | HEIGHT: 73 IN | BODY MASS INDEX: 26.37 KG/M2 | DIASTOLIC BLOOD PRESSURE: 80 MMHG | HEART RATE: 61 BPM | WEIGHT: 199 LBS | SYSTOLIC BLOOD PRESSURE: 122 MMHG | TEMPERATURE: 98 F

## 2023-08-28 DIAGNOSIS — J01.40 ACUTE NON-RECURRENT PANSINUSITIS: Primary | ICD-10-CM

## 2023-08-28 DIAGNOSIS — I10 ESSENTIAL HYPERTENSION: ICD-10-CM

## 2023-08-28 PROCEDURE — 99213 OFFICE O/P EST LOW 20 MIN: CPT | Mod: 25,,, | Performed by: NURSE PRACTITIONER

## 2023-08-28 PROCEDURE — 3079F DIAST BP 80-89 MM HG: CPT | Mod: ,,, | Performed by: NURSE PRACTITIONER

## 2023-08-28 PROCEDURE — 3074F PR MOST RECENT SYSTOLIC BLOOD PRESSURE < 130 MM HG: ICD-10-PCS | Mod: ,,, | Performed by: NURSE PRACTITIONER

## 2023-08-28 PROCEDURE — 1159F PR MEDICATION LIST DOCUMENTED IN MEDICAL RECORD: ICD-10-PCS | Mod: ,,, | Performed by: NURSE PRACTITIONER

## 2023-08-28 PROCEDURE — 96372 PR INJECTION,THERAP/PROPH/DIAG2ST, IM OR SUBCUT: ICD-10-PCS | Mod: ,,, | Performed by: NURSE PRACTITIONER

## 2023-08-28 PROCEDURE — 3079F PR MOST RECENT DIASTOLIC BLOOD PRESSURE 80-89 MM HG: ICD-10-PCS | Mod: ,,, | Performed by: NURSE PRACTITIONER

## 2023-08-28 PROCEDURE — 1159F MED LIST DOCD IN RCRD: CPT | Mod: ,,, | Performed by: NURSE PRACTITIONER

## 2023-08-28 PROCEDURE — 3008F BODY MASS INDEX DOCD: CPT | Mod: ,,, | Performed by: NURSE PRACTITIONER

## 2023-08-28 PROCEDURE — 1160F RVW MEDS BY RX/DR IN RCRD: CPT | Mod: ,,, | Performed by: NURSE PRACTITIONER

## 2023-08-28 PROCEDURE — 3074F SYST BP LT 130 MM HG: CPT | Mod: ,,, | Performed by: NURSE PRACTITIONER

## 2023-08-28 PROCEDURE — 96372 THER/PROPH/DIAG INJ SC/IM: CPT | Mod: ,,, | Performed by: NURSE PRACTITIONER

## 2023-08-28 PROCEDURE — 99213 PR OFFICE/OUTPT VISIT, EST, LEVL III, 20-29 MIN: ICD-10-PCS | Mod: 25,,, | Performed by: NURSE PRACTITIONER

## 2023-08-28 PROCEDURE — 3008F PR BODY MASS INDEX (BMI) DOCUMENTED: ICD-10-PCS | Mod: ,,, | Performed by: NURSE PRACTITIONER

## 2023-08-28 PROCEDURE — 1160F PR REVIEW ALL MEDS BY PRESCRIBER/CLIN PHARMACIST DOCUMENTED: ICD-10-PCS | Mod: ,,, | Performed by: NURSE PRACTITIONER

## 2023-08-28 RX ORDER — AZITHROMYCIN 250 MG/1
TABLET, FILM COATED ORAL
Qty: 6 TABLET | Refills: 0 | Status: SHIPPED | OUTPATIENT
Start: 2023-08-28 | End: 2023-09-06

## 2023-08-28 RX ORDER — CEFTRIAXONE 1 G/1
1 INJECTION, POWDER, FOR SOLUTION INTRAMUSCULAR; INTRAVENOUS
Status: COMPLETED | OUTPATIENT
Start: 2023-08-28 | End: 2023-08-28

## 2023-08-28 RX ORDER — DEXAMETHASONE SODIUM PHOSPHATE 4 MG/ML
4 INJECTION, SOLUTION INTRA-ARTICULAR; INTRALESIONAL; INTRAMUSCULAR; INTRAVENOUS; SOFT TISSUE
Status: COMPLETED | OUTPATIENT
Start: 2023-08-28 | End: 2023-08-28

## 2023-08-28 RX ADMIN — DEXAMETHASONE SODIUM PHOSPHATE 4 MG: 4 INJECTION, SOLUTION INTRA-ARTICULAR; INTRALESIONAL; INTRAMUSCULAR; INTRAVENOUS; SOFT TISSUE at 09:08

## 2023-08-28 RX ADMIN — CEFTRIAXONE 1 G: 1 INJECTION, POWDER, FOR SOLUTION INTRAMUSCULAR; INTRAVENOUS at 09:08

## 2023-08-28 NOTE — ASSESSMENT & PLAN NOTE
BP well controlled. Continue current meds. Instructed to monitor BP while taking decongestants as sometimes they can run blood pressure up.

## 2023-08-28 NOTE — PROGRESS NOTES
Brit Obando NP   [id number]  [place]  [address]      PATIENT NAME: Gary Moscoso  : 1970  DATE: 23  MRN: [id number]      Billing Provider: Brit Obando NP  Level of Service: CO OFFICE/OUTPT VISIT, EST, LEVL III, 20-29 MIN  Patient PCP Information       Provider PCP Type    [facility]            Reason for Visit / Chief Complaint: Sinus Problem (Pt c/o allergies, states he has been sneezing and sinus congestion X 2 days) and Ear Fullness (Pt c/o fullness to his right ear X 3 days.)         History of Present Illness / Problem Focused Workflow     Gary Moscoso presents to the clinic with Sinus Problem (Pt c/o allergies, states he has been sneezing and sinus congestion X 2 days) and Ear Fullness (Pt c/o fullness to his right ear X 3 days.)     52 year old male presents to clinic with complaints of sinus issues. States his right ear started hurting and feeling full on Friday. Then he started having nasal congestion, sneezing, and dry cough on Saturday. Denies fever. Denies known sick contacts. States he took Tylenol Cold and Sinus and it did not help. Also states he took at home COVID test this morning and it was negative.         Review of Systems     @Review of Systems   Constitutional:  Negative for activity change, appetite change, fatigue and fever.   HENT:  Positive for nasal congestion, ear pain, rhinorrhea and sinus pressure/congestion. Negative for sore throat.    Eyes:  Negative for pain, redness, visual disturbance and eye dryness.   Respiratory:  Positive for cough. Negative for shortness of breath.    Cardiovascular:  Negative for chest pain and leg swelling.   Gastrointestinal:  Negative for abdominal distention, abdominal pain, constipation and diarrhea.   Endocrine: Negative for cold intolerance, heat intolerance and polyuria.   Genitourinary:  Negative for bladder incontinence, dysuria, frequency and urgency.    Musculoskeletal:  Negative for arthralgias, gait problem and myalgias.   Integumentary:  Negative for color change, rash and wound.   Allergic/Immunologic: Negative for environmental allergies and food allergies.   Neurological:  Negative for dizziness, weakness, light-headedness and headaches.   Psychiatric/Behavioral:  Negative for behavioral problems and sleep disturbance.        Medical / Social / Family History     Past Medical History:   Diagnosis Date    Aseptic necrosis of head of humerus 09/27/2013    Cyst of epididymis 02/18/2021    Degeneration of lumbar intervertebral disc 03/24/2013    Encounter for examination for driving license 08/06/2018    Hypertension     Hypokalemia     Lateral epicondylitis, left elbow 06/01/2020    Non-diabetic hypoglycemia 01/10/2012    Presence of right artificial hip joint 02/04/2016    Prolapsed cervical intervertebral disc 05/09/2013    without myelopathy       Past Surgical History:   Procedure Laterality Date    ARTHROSCOPY, HIP Left 9/20/2021    Procedure: ARTHROSCOPY, HIP, WITH LABRUM REPAIR;  Surgeon: Charles Yeh MD;  Location: [facility] OR;  Service: Orthopedics;  Laterality: Left;    ARTHROSCOPY, HIP Left 9/20/2021    Procedure: ARTHROSCOPY, HIP, WITH FEMOROPLASTY;  Surgeon: Charles Yeh MD;  Location: [facility] OR;  Service: Orthopedics;  Laterality: Left;    ARTHROSCOPY, HIP Left 9/20/2021    Procedure: ARTHROSCOPY, HIP, WITH ACETABULOPLASTY, WITH REPAIR OF LABRUM IF INDICATED;  Surgeon: Charles Yeh MD;  Location: [facility] OR;  Service: Orthopedics;  Laterality: Left;    CARPAL TUNNEL RELEASE      DIAGNOSTIC LAPAROSCOPY N/A 3/15/2021    Procedure: LAPAROSCOPY, DIAGNOSTIC;  Surgeon: Jay Nolen MD;  Location: [facility] OR;  Service: General;  Laterality: N/A;  1052 FAMILY INFORMED OF SURGERY START  1130 DR NOLEN TALKED TO PATIENT FAMILY    HIP SURGERY      inguinal hernia reparir  03/05/2021    Dr. Robert Nolen    ROBOT-ASSISTED  LAPAROSCOPIC REPAIR OF INGUINAL HERNIA USING DA JALEN XI Bilateral 3/15/2021    Procedure: XI ROBOTIC INGUINAL HERNIA REPAIRS WITH MESH;  Surgeon: Jay Mclain MD;  Location: [facility];  Service: General;  Laterality: Bilateral;    SHOULDER SURGERY      VARICOSE VEIN SURGERY         Social History    reports that he has never smoked. He has never been exposed to tobacco smoke. He has never used smokeless tobacco. He reports that he does not currently use alcohol. He reports that he does not currently use drugs.    Family History  's family history includes Colon cancer in his mother; Diabetes Mellitus in his father; Heart disease in his father; Hypertension in his father and mother.    Medications and Allergies     Medications  Outpatient Medications Marked as Taking for the 8/28/23 encounter (Office Visit) with Brit Obando NP   Medication Sig Dispense Refill    ascorbic acid, vitamin C, (VITAMIN C) 100 MG tablet Take 100 mg by mouth once daily.      aspirin (ECOTRIN) 81 MG EC tablet Take 81 mg by mouth once daily.      CARTIA  mg 24 hr capsule Take 240 mg by mouth once daily.      carvediloL (COREG) 12.5 MG tablet Take 12.5 mg by mouth 2 (two) times daily.      cetirizine (ZYRTEC) 10 MG tablet Take 1 tablet (10 mg total) by mouth once daily. 30 tablet 5    colchicine (COLCRYS) 0.6 mg tablet Take 1 tablet (0.6 mg total) by mouth as needed (gout flare up). 30 tablet 0    esomeprazole (NEXIUM) 20 mg GrPS Take 20 mg by mouth every other day. 30 each 3    gabapentin (NEURONTIN) 100 MG capsule Take 1 capsule (100 mg total) by mouth 3 (three) times daily. 90 capsule 11    ipratropium (ATROVENT) 42 mcg (0.06 %) nasal spray 2 sprays by Nasal route 4 (four) times daily. 15 mL 5    magnesium aspartate HCl 61 mg (615 mg) TbEC Take by mouth once.      naproxen (NAPROSYN) 500 MG tablet Take 1 tablet (500 mg total) by mouth 2 (two) times daily with meals. 60 tablet 1    neomycin-polymyxin-dexamethasone  (DEXACINE) 3.5 mg/g-10,000 unit/g-0.1 % Oint SMARTSIG:Sparingly In Eye(s) Every Night      NIFEdipine (ADALAT CC) 30 MG TbSR Take 30 mg by mouth 2 (two) times a day.      potassium chloride SA (K-DUR,KLOR-CON) 20 MEQ tablet Take 20 mEq by mouth.      spironolactone (ALDACTONE) 25 MG tablet Take 25 mg by mouth once daily.      tadalafiL (CIALIS) 20 MG Tab Take 20 mg by mouth daily as needed.      traMADoL (ULTRAM) 50 mg tablet Take 1 tablet (50 mg total) by mouth every 6 (six) hours as needed for Pain. 30 tablet 0    XIIDRA 5 % Dpet Instill 1 drop into both eyes twice a day as directed       Current Facility-Administered Medications for the 8/28/23 encounter (Office Visit) with Brit Obando NP   Medication Dose Route Frequency Provider Last Rate Last Admin    [COMPLETED] cefTRIAXone injection 1 g  1 g Intramuscular 1 time in Clinic/HOD Brit Obando NP   1 g at 08/28/23 0953    [COMPLETED] dexAMETHasone injection 4 mg  4 mg Intramuscular 1 time in Clinic/HOD Brit Obando NP   4 mg at 08/28/23 0953       Allergies  Review of patient's allergies indicates:   Allergen Reactions    Ace inhibitors Swelling    Lisinopril Anaphylaxis    Codeine Nausea And Vomiting    Mobic [meloxicam] Itching       Physical Examination     Vitals:    08/28/23 0900   BP: 122/80   Pulse: 61   Resp: 18   Temp: 98.1 °F (36.7 °C)     Physical Exam  Vitals and nursing note reviewed.   Constitutional:       Appearance: Normal appearance.   HENT:      Head: Normocephalic.      Right Ear: Tympanic membrane normal.      Left Ear: Tympanic membrane normal.      Nose: Congestion and rhinorrhea present. Rhinorrhea is purulent.      Right Turbinates: Pale.      Left Turbinates: Pale.      Right Sinus: Maxillary sinus tenderness and frontal sinus tenderness present.      Left Sinus: Maxillary sinus tenderness and frontal sinus tenderness present.      Mouth/Throat:      Lips: Pink.      Mouth: Mucous membranes are moist.      Pharynx:  Oropharyngeal exudate (clear post nasal drainage.) and posterior oropharyngeal erythema present.   Eyes:      Conjunctiva/sclera: Conjunctivae normal.   Cardiovascular:      Rate and Rhythm: Normal rate and regular rhythm.      Pulses: Normal pulses.      Heart sounds: Normal heart sounds.   Pulmonary:      Effort: Pulmonary effort is normal.      Breath sounds: Normal breath sounds. No wheezing or rhonchi.   Abdominal:      General: Abdomen is flat. Bowel sounds are normal. There is no distension.      Palpations: Abdomen is soft.      Tenderness: There is no abdominal tenderness.   Musculoskeletal:         General: Normal range of motion.      Cervical back: Normal range of motion.   Lymphadenopathy:      Cervical: Cervical adenopathy present.   Skin:     General: Skin is warm and dry.      Capillary Refill: Capillary refill takes less than 2 seconds.   Neurological:      General: No focal deficit present.      Mental Status: He is alert and oriented to person, place, and time. Mental status is at baseline.   Psychiatric:         Mood and Affect: Mood normal.         Behavior: Behavior normal.               Lab Results   Component Value Date    WBC 5.75 07/06/2023    HGB 14.9 07/06/2023    HCT 44.1 07/06/2023    MCV 87.8 07/06/2023     07/06/2023        CMP  Sodium   Date Value Ref Range Status   07/06/2023 141 136 - 145 mmol/L Final     Potassium   Date Value Ref Range Status   07/06/2023 3.8 3.5 - 5.1 mmol/L Final     Chloride   Date Value Ref Range Status   07/06/2023 107 98 - 107 mmol/L Final     CO2   Date Value Ref Range Status   07/06/2023 27 21 - 32 mmol/L Final     Glucose   Date Value Ref Range Status   07/06/2023 85 74 - 106 mg/dL Final     BUN   Date Value Ref Range Status   07/06/2023 15 7 - 18 mg/dL Final     Creatinine   Date Value Ref Range Status   07/06/2023 1.05 0.70 - 1.30 mg/dL Final     Calcium   Date Value Ref Range Status   07/06/2023 9.2 8.5 - 10.1 mg/dL Final     Total Protein    Date Value Ref Range Status   07/06/2023 7.7 6.4 - 8.2 g/dL Final     Albumin   Date Value Ref Range Status   07/06/2023 4.3 3.5 - 5.0 g/dL Final     Bilirubin, Total   Date Value Ref Range Status   07/06/2023 0.9 >0.0 - 1.2 mg/dL Final     Alk Phos   Date Value Ref Range Status   07/06/2023 102 45 - 115 U/L Final     AST   Date Value Ref Range Status   07/06/2023 18 15 - 37 U/L Final     ALT   Date Value Ref Range Status   07/06/2023 26 16 - 61 U/L Final     Anion Gap   Date Value Ref Range Status   07/06/2023 11 7 - 16 mmol/L Final     eGFR   Date Value Ref Range Status   07/06/2023 85 >=60 mL/min/1.73m2 Final     Procedures   Assessment and Plan (including Health Maintenance)   :    Plan:           Problem List Items Addressed This Visit          ENT    Acute non-recurrent pansinusitis - Primary    Current Assessment & Plan     Rocephin and Decadron given IM in clinic.   Zithromax as ordered and Ed a hist as needed.    Reviewed pathology of current symptoms, medication side effects/risk/benefits/directions on taking medications, and worsening or persistent symptoms that require follow up in next 2-3 days. Saline/steroid nasal sprays, antihistamine use, increase fluid intake, and multivitamin/elderberry/Zinc use were recommended. May take Tylenol or Motrin as needed for pain and/or fever. Patient was instructed to take antibiotic as directed, complete entire course to avoid antibiotic resistance, and take OTC probiotic with antibiotic to prevent GI upset. Patient verbalized understanding of treatment plan and denies any questions            Cardiac/Vascular    Essential hypertension    Current Assessment & Plan     BP well controlled. Continue current meds. Instructed to monitor BP while taking decongestants as sometimes they can run blood pressure up.             Health Maintenance Topics with due status: Not Due       Topic Last Completion Date    Colorectal Cancer Screening 06/24/2021    Lipid Panel  07/06/2023    Influenza Vaccine Not Due       Future Appointments   Date Time Provider Department Center   9/7/2023  9:45 AM Will Villanueva MD [facility] NEURO [facility]        Health Maintenance Due   Topic Date Due    Hepatitis C Screening  Never done    HIV Screening  Never done    TETANUS VACCINE  Never done    Hemoglobin A1c (Diabetic Prevention Screening)  Never done    Shingles Vaccine (1 of 2) Never done    COVID-19 Vaccine (3 - Moderna series) 04/22/2021        No follow-ups on file.     Signature:  Brit Obando NP  [address]  [address]    Date of encounter: 8/28/23

## 2023-08-28 NOTE — ASSESSMENT & PLAN NOTE
Rocephin and Decadron given IM in clinic.   Zithromax as ordered and Ed a hist as needed.    Reviewed pathology of current symptoms, medication side effects/risk/benefits/directions on taking medications, and worsening or persistent symptoms that require follow up in next 2-3 days. Saline/steroid nasal sprays, antihistamine use, increase fluid intake, and multivitamin/elderberry/Zinc use were recommended. May take Tylenol or Motrin as needed for pain and/or fever. Patient was instructed to take antibiotic as directed, complete entire course to avoid antibiotic resistance, and take OTC probiotic with antibiotic to prevent GI upset. Patient verbalized understanding of treatment plan and denies any questions

## 2023-09-01 ENCOUNTER — OFFICE VISIT (OUTPATIENT)
Dept: FAMILY MEDICINE | Facility: CLINIC | Age: 53
End: 2023-09-01
Payer: MEDICARE

## 2023-09-01 VITALS
HEIGHT: 73 IN | WEIGHT: 201.19 LBS | HEART RATE: 59 BPM | OXYGEN SATURATION: 98 % | TEMPERATURE: 98 F | DIASTOLIC BLOOD PRESSURE: 80 MMHG | BODY MASS INDEX: 26.66 KG/M2 | SYSTOLIC BLOOD PRESSURE: 126 MMHG | RESPIRATION RATE: 18 BRPM

## 2023-09-01 DIAGNOSIS — J32.4 CHRONIC PANSINUSITIS: ICD-10-CM

## 2023-09-01 DIAGNOSIS — R89.9 ABNORMAL LABORATORY TEST RESULT: Primary | ICD-10-CM

## 2023-09-01 LAB
ALBUMIN SERPL BCP-MCNC: 4.3 G/DL (ref 3.5–5)
ALP SERPL-CCNC: 95 U/L (ref 45–115)
ALT SERPL W P-5'-P-CCNC: 33 U/L (ref 16–61)
AST SERPL W P-5'-P-CCNC: 22 U/L (ref 15–37)
BILIRUB DIRECT SERPL-MCNC: 0.2 MG/DL (ref 0–0.2)
BILIRUB SERPL-MCNC: 0.9 MG/DL (ref ?–1.2)
PROT SERPL-MCNC: 7.5 G/DL (ref 6.4–8.2)

## 2023-09-01 PROCEDURE — 3074F SYST BP LT 130 MM HG: CPT | Mod: ,,, | Performed by: NURSE PRACTITIONER

## 2023-09-01 PROCEDURE — 99213 PR OFFICE/OUTPT VISIT, EST, LEVL III, 20-29 MIN: ICD-10-PCS | Mod: ,,, | Performed by: NURSE PRACTITIONER

## 2023-09-01 PROCEDURE — 80076 HEPATIC FUNCTION PANEL: CPT | Mod: ,,, | Performed by: CLINICAL MEDICAL LABORATORY

## 2023-09-01 PROCEDURE — 3008F BODY MASS INDEX DOCD: CPT | Mod: ,,, | Performed by: NURSE PRACTITIONER

## 2023-09-01 PROCEDURE — 3074F PR MOST RECENT SYSTOLIC BLOOD PRESSURE < 130 MM HG: ICD-10-PCS | Mod: ,,, | Performed by: NURSE PRACTITIONER

## 2023-09-01 PROCEDURE — 36415 COLL VENOUS BLD VENIPUNCTURE: CPT | Performed by: NURSE PRACTITIONER

## 2023-09-01 PROCEDURE — 99213 OFFICE O/P EST LOW 20 MIN: CPT | Mod: ,,, | Performed by: NURSE PRACTITIONER

## 2023-09-01 PROCEDURE — 3079F PR MOST RECENT DIASTOLIC BLOOD PRESSURE 80-89 MM HG: ICD-10-PCS | Mod: ,,, | Performed by: NURSE PRACTITIONER

## 2023-09-01 PROCEDURE — 80076 HEPATIC FUNCTION PANEL: ICD-10-PCS | Mod: ,,, | Performed by: CLINICAL MEDICAL LABORATORY

## 2023-09-01 PROCEDURE — 3079F DIAST BP 80-89 MM HG: CPT | Mod: ,,, | Performed by: NURSE PRACTITIONER

## 2023-09-01 PROCEDURE — 3008F PR BODY MASS INDEX (BMI) DOCUMENTED: ICD-10-PCS | Mod: ,,, | Performed by: NURSE PRACTITIONER

## 2023-09-01 RX ORDER — IPRATROPIUM BROMIDE 42 UG/1
2 SPRAY, METERED NASAL 4 TIMES DAILY
Qty: 15 ML | Refills: 5 | Status: SHIPPED | OUTPATIENT
Start: 2023-09-01

## 2023-09-01 NOTE — PROGRESS NOTES
rBit Obando NP   [facility]  [address]  [place]  [address]      PATIENT NAME: Gary Moscoso  : 1970  DATE: 23  MRN: [id number]      Billing Provider: Brit Obando NP  Level of Service: [place] OFFICE/OUTPT VISIT, EST, LEVL III, 20-29 MIN  Patient PCP Information       Provider PCP Type    Brit Obando NP General            Reason for Visit / Chief Complaint: follow up on lab (Follow up on labwork from Dr Puga visit )         History of Present Illness / Problem Focused Workflow     Gary Moscoso presents to the clinic with follow up on lab (Follow up on labwork from Dr Puga visit )     53-year-old male presents to clinic for follow up on labs done at Dr. Merida's office.  States his Direct Bili was 0.30 and Indirect was 1.1 on 23. He was told to follow up with PCP and have them rechecked. He denies any abdominal pain.  Denies any change in his bowel movements.  Denies any yellowing of skin or eyes.      Review of Systems     @Review of Systems   Constitutional:  Negative for activity change, appetite change, fatigue and fever.   HENT:  Negative for nasal congestion, ear pain, rhinorrhea, sinus pressure/congestion and sore throat.    Eyes:  Negative for pain, redness, visual disturbance and eye dryness.   Respiratory:  Negative for cough and shortness of breath.    Cardiovascular:  Negative for chest pain and leg swelling.   Gastrointestinal:  Negative for abdominal distention, abdominal pain, constipation and diarrhea.   Endocrine: Negative for cold intolerance, heat intolerance and polyuria.   Genitourinary:  Negative for bladder incontinence, dysuria, frequency and urgency.   Musculoskeletal:  Negative for arthralgias, gait problem and myalgias.   Integumentary:  Negative for color change, rash and wound.   Allergic/Immunologic: Negative for environmental allergies and food allergies.   Neurological:  Negative for dizziness, weakness, light-headedness  and headaches.   Psychiatric/Behavioral:  Negative for behavioral problems and sleep disturbance.        Medical / Social / Family History     Past Medical History:   Diagnosis Date    Aseptic necrosis of head of humerus 09/27/2013    Cyst of epididymis 02/18/2021    Degeneration of lumbar intervertebral disc 03/24/2013    Encounter for examination for driving license 08/06/2018    Hypertension     Hypokalemia     Lateral epicondylitis, left elbow 06/01/2020    Non-diabetic hypoglycemia 01/10/2012    Presence of right artificial hip joint 02/04/2016    Prolapsed cervical intervertebral disc 05/09/2013    without myelopathy       Past Surgical History:   Procedure Laterality Date    ARTHROSCOPY, HIP Left 9/20/2021    Procedure: ARTHROSCOPY, HIP, WITH LABRUM REPAIR;  Surgeon: Charles Yeh MD;  Location: [facility] OR;  Service: Orthopedics;  Laterality: Left;    ARTHROSCOPY, HIP Left 9/20/2021    Procedure: ARTHROSCOPY, HIP, WITH FEMOROPLASTY;  Surgeon: Charles Yeh MD;  Location: [facility] OR;  Service: Orthopedics;  Laterality: Left;    ARTHROSCOPY, HIP Left 9/20/2021    Procedure: ARTHROSCOPY, HIP, WITH ACETABULOPLASTY, WITH REPAIR OF LABRUM IF INDICATED;  Surgeon: Charles Yeh MD;  Location: [facility] OR;  Service: Orthopedics;  Laterality: Left;    BIOPSY, PROSTATE, USING PROSTATE MAPPING N/A 10/5/2023    Procedure: BIOPSY, PROSTATE, USING PROSTATE MAPPING;  Surgeon: Kristofer Puga MD;  Location: [facility] OR;  Service: Urology;  Laterality: N/A;    CARPAL TUNNEL RELEASE      DIAGNOSTIC LAPAROSCOPY N/A 3/15/2021    Procedure: LAPAROSCOPY, DIAGNOSTIC;  Surgeon: Jay Nolen MD;  Location: [facility] OR;  Service: General;  Laterality: N/A;  1052 FAMILY INFORMED OF SURGERY START  1130 DR NOLEN TALKED TO PATIENT FAMILY    HIP SURGERY      inguinal hernia reparir  03/05/2021    Dr. Robert Nolen    ROBOT-ASSISTED LAPAROSCOPIC REPAIR OF INGUINAL HERNIA USING DA JALEN XI Bilateral  3/15/2021    Procedure: XI ROBOTIC INGUINAL HERNIA REPAIRS WITH MESH;  Surgeon: Jay Mclain MD;  Location: [facility];  Service: General;  Laterality: Bilateral;    SHOULDER SURGERY      VARICOSE VEIN SURGERY         Social History    reports that he has never smoked. He has never been exposed to tobacco smoke. He has never used smokeless tobacco. He reports that he does not currently use alcohol. He reports that he does not currently use drugs.    Family History  's family history includes Cancer in his maternal grandmother and paternal grandfather; Colon cancer in his mother; Diabetes in his paternal grandmother; Diabetes Mellitus in his father; Heart disease in his father, maternal grandfather, and paternal grandmother; Hypertension in his father, mother, and paternal grandmother.    Medications and Allergies     Medications  Outpatient Medications Marked as Taking for the 9/1/23 encounter (Office Visit) with Brit Obando NP   Medication Sig Dispense Refill    ascorbic acid, vitamin C, (VITAMIN C) 100 MG tablet Take 100 mg by mouth once daily.      CARTIA  mg 24 hr capsule Take 240 mg by mouth once daily.      carvediloL (COREG) 12.5 MG tablet Take 12.5 mg by mouth 2 (two) times daily.      cetirizine (ZYRTEC) 10 MG tablet Take 1 tablet (10 mg total) by mouth once daily. 30 tablet 5    colchicine (COLCRYS) 0.6 mg tablet Take 1 tablet (0.6 mg total) by mouth as needed (gout flare up). 30 tablet 0    ipratropium (ATROVENT) 42 mcg (0.06 %) nasal spray 2 sprays by Each Nostril route 4 (four) times daily. 15 mL 5    magnesium aspartate HCl 61 mg (615 mg) TbEC Take by mouth once.      neomycin-polymyxin-dexamethasone (DEXACINE) 3.5 mg/g-10,000 unit/g-0.1 % Oint SMARTSIG:Sparingly In Eye(s) Every Night      NIFEdipine (ADALAT CC) 30 MG TbSR Take 30 mg by mouth 2 (two) times a day.      potassium chloride SA (K-DUR,KLOR-CON) 20 MEQ tablet Take 20 mEq by mouth.      spironolactone  (ALDACTONE) 25 MG tablet Take 25 mg by mouth once daily.      tadalafiL (CIALIS) 20 MG Tab Take 20 mg by mouth daily as needed.      traMADoL (ULTRAM) 50 mg tablet Take 1 tablet (50 mg total) by mouth every 6 (six) hours as needed for Pain. 30 tablet 0    XIIDRA 5 % Dpet Instill 1 drop into both eyes twice a day as directed      [DISCONTINUED] aspirin (ECOTRIN) 81 MG EC tablet Take 81 mg by mouth once daily.      [DISCONTINUED] esomeprazole (NEXIUM) 20 mg GrPS Take 20 mg by mouth every other day. 30 each 3    [DISCONTINUED] gabapentin (NEURONTIN) 100 MG capsule Take 1 capsule (100 mg total) by mouth 3 (three) times daily. 90 capsule 11    [DISCONTINUED] ipratropium (ATROVENT) 42 mcg (0.06 %) nasal spray 2 sprays by Nasal route 4 (four) times daily. 15 mL 5    [DISCONTINUED] naproxen (NAPROSYN) 500 MG tablet Take 1 tablet (500 mg total) by mouth 2 (two) times daily with meals. 60 tablet 1       Allergies  Review of patient's allergies indicates:   Allergen Reactions    Ace inhibitors Swelling    Lisinopril Anaphylaxis    Codeine Nausea And Vomiting    Mobic [meloxicam] Itching       Physical Examination     Vitals:    09/01/23 0942   BP: 126/80   Pulse: (!) 59   Resp: 18   Temp: 98.1 °F (36.7 °C)     Physical Exam  Vitals and nursing note reviewed.   HENT:      Head: Normocephalic.      Right Ear: Tympanic membrane normal.      Left Ear: Tympanic membrane normal.      Nose: Nose normal.      Mouth/Throat:      Mouth: Mucous membranes are moist.      Pharynx: Oropharynx is clear. No posterior oropharyngeal erythema.   Eyes:      Conjunctiva/sclera: Conjunctivae normal.   Cardiovascular:      Rate and Rhythm: Normal rate and regular rhythm.      Pulses: Normal pulses.      Heart sounds: Normal heart sounds.   Pulmonary:      Effort: Pulmonary effort is normal.      Breath sounds: Normal breath sounds.   Abdominal:      General: Abdomen is flat. Bowel sounds are normal. There is no distension.       Palpations: Abdomen is soft.   Musculoskeletal:         General: No swelling or tenderness. Normal range of motion.      Cervical back: Normal range of motion.      Right lower leg: No edema.      Left lower leg: No edema.   Skin:     General: Skin is warm and dry.      Capillary Refill: Capillary refill takes less than 2 seconds.   Neurological:      Mental Status: He is alert. Mental status is at baseline.   Psychiatric:         Mood and Affect: Mood normal.         Behavior: Behavior normal.             Lab Results   Component Value Date    WBC 5.75 07/06/2023    HGB 14.9 07/06/2023    HCT 44.1 07/06/2023    MCV 87.8 07/06/2023     07/06/2023        CMP  Sodium   Date Value Ref Range Status   07/06/2023 141 136 - 145 mmol/L Final     Potassium   Date Value Ref Range Status   07/06/2023 3.8 3.5 - 5.1 mmol/L Final     Chloride   Date Value Ref Range Status   07/06/2023 107 98 - 107 mmol/L Final     CO2   Date Value Ref Range Status   07/06/2023 27 21 - 32 mmol/L Final     Glucose   Date Value Ref Range Status   07/06/2023 85 74 - 106 mg/dL Final     BUN   Date Value Ref Range Status   07/06/2023 15 7 - 18 mg/dL Final     Creatinine   Date Value Ref Range Status   07/06/2023 1.05 0.70 - 1.30 mg/dL Final     Calcium   Date Value Ref Range Status   07/06/2023 9.2 8.5 - 10.1 mg/dL Final     Total Protein   Date Value Ref Range Status   09/01/2023 7.5 6.4 - 8.2 g/dL Final     Albumin   Date Value Ref Range Status   09/01/2023 4.3 3.5 - 5.0 g/dL Final     Bilirubin, Total   Date Value Ref Range Status   09/01/2023 0.9 >0.0 - 1.2 mg/dL Final     Alk Phos   Date Value Ref Range Status   09/01/2023 95 45 - 115 U/L Final     AST   Date Value Ref Range Status   09/01/2023 22 15 - 37 U/L Final     ALT   Date Value Ref Range Status   09/01/2023 33 16 - 61 U/L Final     Anion Gap   Date Value Ref Range Status   07/06/2023 11 7 - 16 mmol/L Final     eGFR   Date Value Ref Range Status   07/06/2023 85 >=60 mL/min/1.73m2  Final     Procedures   Assessment and Plan (including Health Maintenance)   :    Plan:           Problem List Items Addressed This Visit          ENT    RESOLVED: Chronic pansinusitis    Relevant Medications    ipratropium (ATROVENT) 42 mcg (0.06 %) nasal spray     Other Visit Diagnoses       Abnormal laboratory test result    -  Primary    Relevant Orders    Bilirubin, Total and Direct    Hepatic Function Panel (Completed)            Health Maintenance Topics with due status: Not Due       Topic Last Completion Date    TETANUS VACCINE 04/30/2018    Colorectal Cancer Screening 06/24/2021    Lipid Panel 07/06/2023       Future Appointments   Date Time Provider Department Center   11/21/2024  9:00 AM AWV NURSE [facility]        Health Maintenance Due   Topic Date Due    HIV Screening  Never done    Hemoglobin A1c (Diabetic Prevention Screening)  Never done    Shingles Vaccine (2 of 2) 07/28/2022    COVID-19 Vaccine (6 - 2023-24 season) 11/20/2023        No follow-ups on file.     Signature:  Brit Obando NP  [facility]  [address] [address] [place]  [address]    Date of encounter: 9/1/23

## 2023-09-05 NOTE — PROGRESS NOTES
Labs reviewed: Please let patient know all of his liver enzymes and bilirubin were back to normal. They could have been elevated for a number of reasons such as viral illness or medication he may have taken. They are all normal now. We will continue to monitor with his routine lab work. Thanks.

## 2023-09-06 NOTE — PROGRESS NOTES
Patient notified of results and instructions with understanding verbalized.  Patient reports that the pharmacist told him that the gout medicine he takes and the carvedilol could have caused the levels to be increased.

## 2023-09-07 ENCOUNTER — OFFICE VISIT (OUTPATIENT)
Dept: NEUROLOGY | Facility: CLINIC | Age: 53
End: 2023-09-07
Payer: MEDICARE

## 2023-09-07 VITALS
WEIGHT: 201.31 LBS | HEIGHT: 73 IN | HEART RATE: 61 BPM | DIASTOLIC BLOOD PRESSURE: 86 MMHG | SYSTOLIC BLOOD PRESSURE: 138 MMHG | RESPIRATION RATE: 18 BRPM | BODY MASS INDEX: 26.68 KG/M2 | OXYGEN SATURATION: 100 %

## 2023-09-07 DIAGNOSIS — G47.30 SLEEP APNEA, UNSPECIFIED TYPE: Primary | ICD-10-CM

## 2023-09-07 PROCEDURE — 3075F PR MOST RECENT SYSTOLIC BLOOD PRESS GE 130-139MM HG: ICD-10-PCS | Mod: CPTII,,, | Performed by: PSYCHIATRY & NEUROLOGY

## 2023-09-07 PROCEDURE — 3008F PR BODY MASS INDEX (BMI) DOCUMENTED: ICD-10-PCS | Mod: CPTII,,, | Performed by: PSYCHIATRY & NEUROLOGY

## 2023-09-07 PROCEDURE — 3008F BODY MASS INDEX DOCD: CPT | Mod: CPTII,,, | Performed by: PSYCHIATRY & NEUROLOGY

## 2023-09-07 PROCEDURE — 1159F MED LIST DOCD IN RCRD: CPT | Mod: CPTII,,, | Performed by: PSYCHIATRY & NEUROLOGY

## 2023-09-07 PROCEDURE — 3079F DIAST BP 80-89 MM HG: CPT | Mod: CPTII,,, | Performed by: PSYCHIATRY & NEUROLOGY

## 2023-09-07 PROCEDURE — 3079F PR MOST RECENT DIASTOLIC BLOOD PRESSURE 80-89 MM HG: ICD-10-PCS | Mod: CPTII,,, | Performed by: PSYCHIATRY & NEUROLOGY

## 2023-09-07 PROCEDURE — 3075F SYST BP GE 130 - 139MM HG: CPT | Mod: CPTII,,, | Performed by: PSYCHIATRY & NEUROLOGY

## 2023-09-07 PROCEDURE — 99215 OFFICE O/P EST HI 40 MIN: CPT | Mod: PBBFAC | Performed by: PSYCHIATRY & NEUROLOGY

## 2023-09-07 PROCEDURE — 1159F PR MEDICATION LIST DOCUMENTED IN MEDICAL RECORD: ICD-10-PCS | Mod: CPTII,,, | Performed by: PSYCHIATRY & NEUROLOGY

## 2023-09-07 PROCEDURE — 99214 PR OFFICE/OUTPT VISIT, EST, LEVL IV, 30-39 MIN: ICD-10-PCS | Mod: S$PBB,,, | Performed by: PSYCHIATRY & NEUROLOGY

## 2023-09-07 PROCEDURE — 99214 OFFICE O/P EST MOD 30 MIN: CPT | Mod: S$PBB,,, | Performed by: PSYCHIATRY & NEUROLOGY

## 2023-09-07 NOTE — PROGRESS NOTES
Subjective:       Patient ID: Gary Moscoso is a 52 y.o. male     Chief Complaint:    Chief Complaint   Patient presents with    Follow-up     Sleep apnea        Allergies:  Ace inhibitors, Lisinopril, Codeine, and Mobic [meloxicam]    Current Medications:    Outpatient Encounter Medications as of 9/7/2023   Medication Sig Dispense Refill    ascorbic acid, vitamin C, (VITAMIN C) 100 MG tablet Take 100 mg by mouth once daily.      aspirin (ECOTRIN) 81 MG EC tablet Take 81 mg by mouth once daily.      CARTIA  mg 24 hr capsule Take 240 mg by mouth once daily.      carvediloL (COREG) 12.5 MG tablet Take 12.5 mg by mouth 2 (two) times daily.      cetirizine (ZYRTEC) 10 MG tablet Take 1 tablet (10 mg total) by mouth once daily. 30 tablet 5    colchicine (COLCRYS) 0.6 mg tablet Take 1 tablet (0.6 mg total) by mouth as needed (gout flare up). 30 tablet 0    esomeprazole (NEXIUM) 20 mg GrPS Take 20 mg by mouth every other day. 30 each 3    gabapentin (NEURONTIN) 100 MG capsule Take 1 capsule (100 mg total) by mouth 3 (three) times daily. 90 capsule 11    ipratropium (ATROVENT) 42 mcg (0.06 %) nasal spray 2 sprays by Each Nostril route 4 (four) times daily. 15 mL 5    magnesium aspartate HCl 61 mg (615 mg) TbEC Take by mouth once.      naproxen (NAPROSYN) 500 MG tablet Take 1 tablet (500 mg total) by mouth 2 (two) times daily with meals. 60 tablet 1    neomycin-polymyxin-dexamethasone (DEXACINE) 3.5 mg/g-10,000 unit/g-0.1 % Oint SMARTSIG:Sparingly In Eye(s) Every Night      NIFEdipine (ADALAT CC) 30 MG TbSR Take 30 mg by mouth 2 (two) times a day.      potassium chloride SA (K-DUR,KLOR-CON) 20 MEQ tablet Take 20 mEq by mouth.      spironolactone (ALDACTONE) 25 MG tablet Take 25 mg by mouth once daily.      tadalafiL (CIALIS) 20 MG Tab Take 20 mg by mouth daily as needed.      traMADoL (ULTRAM) 50 mg tablet Take 1 tablet (50 mg total) by mouth every 6 (six) hours as needed for Pain. 30 tablet 0    XIIDRA 5 % Dpet  Instill 1 drop into both eyes twice a day as directed      [DISCONTINUED] amitriptyline (ELAVIL) 50 MG tablet Take 1 tablet (50 mg total) by mouth every evening. (Patient not taking: Reported on 8/28/2023) 30 tablet 11    [DISCONTINUED] azithromycin (Z-LENNOX) 250 MG tablet Take 2 tablets by mouth on day 1; Take 1 tablet by mouth on days 2-5 (Patient not taking: Reported on 9/1/2023) 6 tablet 0    [DISCONTINUED] chlorpheniramine-phenylephrine 4-10 mg per tablet Take 1 tablet by mouth every 4 (four) hours as needed for Congestion. (Patient not taking: Reported on 9/1/2023) 30 tablet 0    [DISCONTINUED] ipratropium (ATROVENT) 42 mcg (0.06 %) nasal spray 2 sprays by Nasal route 4 (four) times daily. 15 mL 5     No facility-administered encounter medications on file as of 9/7/2023.       History of Present Illness  52-year-old black male in clinic for follow-up evaluation of MRI of the brain which showed absolutely no acute intracranial pathology and nothing to explain his symptoms of dizziness, altered mental status or any element of central sleep apnea.  Patient needs to continue with a thorough sleep study evaluation and need for CPAP/BiPAP which  is the treatment of central sleep apnea- states just started CPAP and feels it is working fairly well         Past Medical History:   Diagnosis Date    Aseptic necrosis of head of humerus 09/27/2013    Cyst of epididymis 02/18/2021    Degeneration of lumbar intervertebral disc 03/24/2013    Encounter for examination for driving license 08/06/2018    Hypertension     Hypokalemia     Lateral epicondylitis, left elbow 06/01/2020    Non-diabetic hypoglycemia 01/10/2012    Presence of right artificial hip joint 02/04/2016    Prolapsed cervical intervertebral disc 05/09/2013    without myelopathy       Past Surgical History:   Procedure Laterality Date    ARTHROSCOPY, HIP Left 9/20/2021    Procedure: ARTHROSCOPY, HIP, WITH LABRUM REPAIR;  Surgeon: Charles Yeh MD;  Location: [facility];  Service: Orthopedics;  Laterality: Left;    ARTHROSCOPY, HIP Left 9/20/2021    Procedure: ARTHROSCOPY, HIP, WITH FEMOROPLASTY;  Surgeon: Charles Yeh MD;  Location: [facility] OR;  Service: Orthopedics;  Laterality: Left;    ARTHROSCOPY, HIP Left 9/20/2021    Procedure: ARTHROSCOPY, HIP, WITH ACETABULOPLASTY, WITH REPAIR OF LABRUM IF INDICATED;  Surgeon: Charles Yeh MD;  Location: [facility] OR;  Service: Orthopedics;  Laterality: Left;    CARPAL TUNNEL RELEASE      DIAGNOSTIC LAPAROSCOPY N/A 3/15/2021    Procedure: LAPAROSCOPY, DIAGNOSTIC;  Surgeon: Jay Nolen MD;  Location: [facility] OR;  Service: General;  Laterality: N/A;  1052 FAMILY INFORMED OF SURGERY START  1130 DR NOLEN TALKED TO PATIENT FAMILY    HIP SURGERY      inguinal hernia reparir  03/05/2021    Dr. Robert Nolen    ROBOT-ASSISTED LAPAROSCOPIC REPAIR OF INGUINAL HERNIA USING DA JALEN XI Bilateral 3/15/2021    Procedure: XI ROBOTIC INGUINAL HERNIA REPAIRS WITH MESH;  Surgeon: Jay Nolen MD;  Location: [facility] OR;  Service: General;  Laterality: Bilateral;    SHOULDER SURGERY      VARICOSE VEIN SURGERY         Social History  Mr. Moscoso  reports that he has never smoked. He has never been exposed to tobacco smoke. He has never used smokeless tobacco. He reports that he does not currently use alcohol. He reports that he does not currently use drugs.    Family History  Mr.'s Mosocso family history includes Colon cancer in his mother; Diabetes Mellitus in his father; Heart disease in his father; Hypertension in his father and mother.    Review of Systems  Review of Systems   Psychiatric/Behavioral:  Positive for depression. The patient has insomnia.    All other systems reviewed and are negative.     Objective:   /86 (BP Location: Left arm, Patient Position: Sitting, BP Method: Large (Manual))   Pulse 61   Resp 18   Ht 6' 1" (1.854 m)   Wt 91.3 kg (201 lb 4.8 oz)   SpO2 100%   BMI 26.56 kg/m²    NEUROLOGICAL " EXAMINATION:     MENTAL STATUS   Oriented to person, place, and time.   Level of consciousness: drowsy  Knowledge: consistent with education.     CRANIAL NERVES   Cranial nerves II through XII intact.     MOTOR EXAM     Strength   Strength 5/5 throughout.     GAIT AND COORDINATION     Gait  Gait: normal       Physical Exam  Vitals reviewed.   Constitutional:       Appearance: He is normal weight.   Neurological:      General: No focal deficit present.      Mental Status: He is alert and oriented to person, place, and time. Mental status is at baseline.      Cranial Nerves: Cranial nerves 2-12 are intact.      Motor: Motor strength is normal.     Gait: Gait is intact.          Assessment:     Sleep apnea, unspecified type         Primary Diagnosis and ICD10  Sleep apnea, unspecified type [G47.30]    Plan:     Patient Instructions   Patient needs to continue evaluation for sleep study/polysomnogram to evaluate element of sleep apnea and the need for CPAP/BiPAP  MRI of the brain showed no acute intracranial pathology and nothing to explain any elements of dizziness, lightheadedness or sleep apnea.  Nothing further to offer from neuro perspective   Follow-up p.r.n.    Medications Discontinued During This Encounter   Medication Reason    amitriptyline (ELAVIL) 50 MG tablet     azithromycin (Z-LENNOX) 250 MG tablet     chlorpheniramine-phenylephrine 4-10 mg per tablet        Requested Prescriptions      No prescriptions requested or ordered in this encounter

## 2023-09-07 NOTE — PATIENT INSTRUCTIONS
Patient needs to continue evaluation for sleep study/polysomnogram to evaluate element of sleep apnea and cont CPAP   MRI of the brain showed no acute intracranial pathology and nothing to explain any elements of dizziness, lightheadedness or sleep apnea.  Nothing further to offer from neuro perspective   Follow-up p.r.n.

## 2023-10-04 DIAGNOSIS — R97.20 ELEVATED PSA: Primary | ICD-10-CM

## 2023-10-04 RX ORDER — DIPHENHYDRAMINE HYDROCHLORIDE 50 MG/ML
25 INJECTION INTRAMUSCULAR; INTRAVENOUS EVERY 6 HOURS PRN
Status: CANCELLED | OUTPATIENT
Start: 2023-10-04

## 2023-10-04 RX ORDER — ONDANSETRON 2 MG/ML
4 INJECTION INTRAMUSCULAR; INTRAVENOUS EVERY 4 HOURS PRN
Status: CANCELLED | OUTPATIENT
Start: 2023-10-05

## 2023-10-04 RX ORDER — SODIUM CHLORIDE, SODIUM LACTATE, POTASSIUM CHLORIDE, CALCIUM CHLORIDE 600; 310; 30; 20 MG/100ML; MG/100ML; MG/100ML; MG/100ML
INJECTION, SOLUTION INTRAVENOUS CONTINUOUS
Status: CANCELLED | OUTPATIENT
Start: 2023-10-05

## 2023-10-05 ENCOUNTER — HOSPITAL ENCOUNTER (OUTPATIENT)
Facility: HOSPITAL | Age: 53
Discharge: HOME | End: 2023-10-05
Attending: UROLOGY | Admitting: UROLOGY
Payer: MEDICARE

## 2023-10-05 ENCOUNTER — ANESTHESIA EVENT (OUTPATIENT)
Dept: SURGERY | Facility: HOSPITAL | Age: 53
End: 2023-10-05
Payer: MEDICARE

## 2023-10-05 ENCOUNTER — ANESTHESIA (OUTPATIENT)
Dept: SURGERY | Facility: HOSPITAL | Age: 53
End: 2023-10-05
Payer: MEDICARE

## 2023-10-05 VITALS
WEIGHT: 198 LBS | OXYGEN SATURATION: 100 % | HEIGHT: 73 IN | DIASTOLIC BLOOD PRESSURE: 88 MMHG | SYSTOLIC BLOOD PRESSURE: 126 MMHG | HEART RATE: 47 BPM | RESPIRATION RATE: 13 BRPM | BODY MASS INDEX: 26.24 KG/M2 | TEMPERATURE: 98 F

## 2023-10-05 DIAGNOSIS — R97.20 ELEVATED PSA: ICD-10-CM

## 2023-10-05 DIAGNOSIS — R93.89 ABNORMAL MRI: ICD-10-CM

## 2023-10-05 PROCEDURE — 88344 SURGICAL PATHOLOGY: ICD-10-PCS | Mod: 26,,, | Performed by: PATHOLOGY

## 2023-10-05 PROCEDURE — 88305 TISSUE EXAM BY PATHOLOGIST: CPT | Mod: TC,SUR,59 | Performed by: UROLOGY

## 2023-10-05 PROCEDURE — 27000510 HC BLANKET BAIR HUGGER ANY SIZE: Performed by: ANESTHESIOLOGY

## 2023-10-05 PROCEDURE — 63600175 PHARM REV CODE 636 W HCPCS: Performed by: UROLOGY

## 2023-10-05 PROCEDURE — D9220A PRA ANESTHESIA: ICD-10-PCS | Mod: ANES,,, | Performed by: ANESTHESIOLOGY

## 2023-10-05 PROCEDURE — 25000003 PHARM REV CODE 250: Performed by: NURSE ANESTHETIST, CERTIFIED REGISTERED

## 2023-10-05 PROCEDURE — 36000704 HC OR TIME LEV I 1ST 15 MIN: Performed by: UROLOGY

## 2023-10-05 PROCEDURE — 96372 THER/PROPH/DIAG INJ SC/IM: CPT | Performed by: UROLOGY

## 2023-10-05 PROCEDURE — 37000008 HC ANESTHESIA 1ST 15 MINUTES: Performed by: UROLOGY

## 2023-10-05 PROCEDURE — 71000015 HC POSTOP RECOV 1ST HR: Performed by: UROLOGY

## 2023-10-05 PROCEDURE — G0416 PROSTATE BIOPSY, ANY MTHD: HCPCS | Mod: 26,,, | Performed by: PATHOLOGY

## 2023-10-05 PROCEDURE — 27000716 HC OXISENSOR PROBE, ANY SIZE: Performed by: ANESTHESIOLOGY

## 2023-10-05 PROCEDURE — 36000705 HC OR TIME LEV I EA ADD 15 MIN: Performed by: UROLOGY

## 2023-10-05 PROCEDURE — 37000009 HC ANESTHESIA EA ADD 15 MINS: Performed by: UROLOGY

## 2023-10-05 PROCEDURE — 27000655: Performed by: ANESTHESIOLOGY

## 2023-10-05 PROCEDURE — D9220A PRA ANESTHESIA: Mod: CRNA,,, | Performed by: NURSE ANESTHETIST, CERTIFIED REGISTERED

## 2023-10-05 PROCEDURE — 63600175 PHARM REV CODE 636 W HCPCS: Performed by: NURSE ANESTHETIST, CERTIFIED REGISTERED

## 2023-10-05 PROCEDURE — 27000177 HC AIRWAY, LARYNGEAL MASK: Performed by: ANESTHESIOLOGY

## 2023-10-05 PROCEDURE — G0416 SURGICAL PATHOLOGY: ICD-10-PCS | Mod: 26,,, | Performed by: PATHOLOGY

## 2023-10-05 PROCEDURE — D9220A PRA ANESTHESIA: ICD-10-PCS | Mod: CRNA,,, | Performed by: NURSE ANESTHETIST, CERTIFIED REGISTERED

## 2023-10-05 PROCEDURE — D9220A PRA ANESTHESIA: Mod: ANES,,, | Performed by: ANESTHESIOLOGY

## 2023-10-05 PROCEDURE — 88344 IMHCHEM/IMCYTCHM EA MLT ANTB: CPT | Mod: TC,SUR,59 | Performed by: UROLOGY

## 2023-10-05 PROCEDURE — 71000033 HC RECOVERY, INTIAL HOUR: Performed by: UROLOGY

## 2023-10-05 PROCEDURE — 88344 IMHCHEM/IMCYTCHM EA MLT ANTB: CPT | Mod: 26,,, | Performed by: PATHOLOGY

## 2023-10-05 RX ORDER — LIDOCAINE HYDROCHLORIDE 20 MG/ML
INJECTION, SOLUTION EPIDURAL; INFILTRATION; INTRACAUDAL; PERINEURAL
Status: DISCONTINUED | OUTPATIENT
Start: 2023-10-05 | End: 2023-10-05

## 2023-10-05 RX ORDER — MIDAZOLAM HYDROCHLORIDE 1 MG/ML
INJECTION INTRAMUSCULAR; INTRAVENOUS
Status: DISCONTINUED | OUTPATIENT
Start: 2023-10-05 | End: 2023-10-05

## 2023-10-05 RX ORDER — ONDANSETRON 2 MG/ML
4 INJECTION INTRAMUSCULAR; INTRAVENOUS EVERY 4 HOURS PRN
Status: DISCONTINUED | OUTPATIENT
Start: 2023-10-05 | End: 2023-10-05 | Stop reason: HOSPADM

## 2023-10-05 RX ORDER — HYDROMORPHONE HYDROCHLORIDE 2 MG/ML
0.5 INJECTION, SOLUTION INTRAMUSCULAR; INTRAVENOUS; SUBCUTANEOUS EVERY 5 MIN PRN
Status: DISCONTINUED | OUTPATIENT
Start: 2023-10-05 | End: 2023-10-05 | Stop reason: HOSPADM

## 2023-10-05 RX ORDER — ONDANSETRON 2 MG/ML
4 INJECTION INTRAMUSCULAR; INTRAVENOUS DAILY PRN
Status: DISCONTINUED | OUTPATIENT
Start: 2023-10-05 | End: 2023-10-05 | Stop reason: HOSPADM

## 2023-10-05 RX ORDER — MORPHINE SULFATE 10 MG/ML
4 INJECTION INTRAMUSCULAR; INTRAVENOUS; SUBCUTANEOUS EVERY 5 MIN PRN
Status: DISCONTINUED | OUTPATIENT
Start: 2023-10-05 | End: 2023-10-05 | Stop reason: HOSPADM

## 2023-10-05 RX ORDER — SODIUM CHLORIDE, SODIUM LACTATE, POTASSIUM CHLORIDE, CALCIUM CHLORIDE 600; 310; 30; 20 MG/100ML; MG/100ML; MG/100ML; MG/100ML
INJECTION, SOLUTION INTRAVENOUS CONTINUOUS
Status: DISCONTINUED | OUTPATIENT
Start: 2023-10-05 | End: 2023-10-05 | Stop reason: HOSPADM

## 2023-10-05 RX ORDER — FENTANYL CITRATE 50 UG/ML
INJECTION, SOLUTION INTRAMUSCULAR; INTRAVENOUS
Status: DISCONTINUED | OUTPATIENT
Start: 2023-10-05 | End: 2023-10-05

## 2023-10-05 RX ORDER — HYDROCODONE BITARTRATE AND ACETAMINOPHEN 5; 325 MG/1; MG/1
1 TABLET ORAL EVERY 6 HOURS PRN
Status: DISCONTINUED | OUTPATIENT
Start: 2023-10-05 | End: 2023-10-05 | Stop reason: HOSPADM

## 2023-10-05 RX ORDER — LIDOCAINE HYDROCHLORIDE 10 MG/ML
1 INJECTION, SOLUTION EPIDURAL; INFILTRATION; INTRACAUDAL; PERINEURAL ONCE
Status: DISCONTINUED | OUTPATIENT
Start: 2023-10-05 | End: 2023-10-05 | Stop reason: HOSPADM

## 2023-10-05 RX ORDER — DIPHENHYDRAMINE HYDROCHLORIDE 50 MG/ML
25 INJECTION INTRAMUSCULAR; INTRAVENOUS EVERY 6 HOURS PRN
Status: DISCONTINUED | OUTPATIENT
Start: 2023-10-05 | End: 2023-10-05 | Stop reason: HOSPADM

## 2023-10-05 RX ORDER — MEPERIDINE HYDROCHLORIDE 25 MG/ML
25 INJECTION INTRAMUSCULAR; INTRAVENOUS; SUBCUTANEOUS EVERY 10 MIN PRN
Status: DISCONTINUED | OUTPATIENT
Start: 2023-10-05 | End: 2023-10-05 | Stop reason: HOSPADM

## 2023-10-05 RX ORDER — PROPOFOL 10 MG/ML
VIAL (ML) INTRAVENOUS
Status: DISCONTINUED | OUTPATIENT
Start: 2023-10-05 | End: 2023-10-05

## 2023-10-05 RX ORDER — SODIUM CHLORIDE, SODIUM LACTATE, POTASSIUM CHLORIDE, CALCIUM CHLORIDE 600; 310; 30; 20 MG/100ML; MG/100ML; MG/100ML; MG/100ML
125 INJECTION, SOLUTION INTRAVENOUS CONTINUOUS
Status: DISCONTINUED | OUTPATIENT
Start: 2023-10-05 | End: 2023-10-05 | Stop reason: HOSPADM

## 2023-10-05 RX ORDER — GENTAMICIN SULFATE 40 MG/ML
120 INJECTION, SOLUTION INTRAMUSCULAR; INTRAVENOUS
Status: DISCONTINUED | OUTPATIENT
Start: 2023-10-05 | End: 2023-10-05 | Stop reason: HOSPADM

## 2023-10-05 RX ADMIN — PROPOFOL 140 MG: 10 INJECTION, EMULSION INTRAVENOUS at 09:10

## 2023-10-05 RX ADMIN — SODIUM CHLORIDE, POTASSIUM CHLORIDE, SODIUM LACTATE AND CALCIUM CHLORIDE: 600; 310; 30; 20 INJECTION, SOLUTION INTRAVENOUS at 08:10

## 2023-10-05 RX ADMIN — GENTAMICIN SULFATE 120 MG: 40 INJECTION, SOLUTION INTRAMUSCULAR; INTRAVENOUS at 08:10

## 2023-10-05 RX ADMIN — FENTANYL CITRATE 100 MCG: 50 INJECTION INTRAMUSCULAR; INTRAVENOUS at 09:10

## 2023-10-05 RX ADMIN — ONDANSETRON 4 MG: 2 INJECTION INTRAMUSCULAR; INTRAVENOUS at 09:10

## 2023-10-05 RX ADMIN — MIDAZOLAM HYDROCHLORIDE 2 MG: 1 INJECTION, SOLUTION INTRAMUSCULAR; INTRAVENOUS at 09:10

## 2023-10-05 RX ADMIN — LIDOCAINE HYDROCHLORIDE 100 MG: 20 INJECTION, SOLUTION INTRAVENOUS at 09:10

## 2023-10-05 NOTE — PROGRESS NOTES
1020 RECEIVED TO RR ASLEEP, EASILY AROUSED. COLOR PINK. RESP. UNLABORED. O2 VIA FM. IV INFUSING WELL RIGHT HAND 20G. CATH. DENNIS HOSE IN PROGRESS. NO BLEEDING FROM RECTAL AREA. NO C/O PAIN. SEE FLOW SHEET.    1040 C/O LIP BEING SORE, SWOLLEN ,NOTED BOTTOM LIP SWOLLEN. NO SKIN BREAK NOTED.      1050 TRANSFERRED TO ROOM. TRIED TO URINATE IN URINAL, UNSUCCESSFUL. NO APPARENT DISTRESS

## 2023-10-05 NOTE — TRANSFER OF CARE
"Anesthesia Transfer of Care Note    Patient: Gary Moscoso    Procedure(s) Performed: Procedure(s) (LRB):  BIOPSY, PROSTATE, USING PROSTATE MAPPING (N/A)    Patient location: PACU    Anesthesia Type: general    Transport from OR: Transported from OR on 6-10 L/min O2 by face mask with adequate spontaneous ventilation    Post pain: adequate analgesia    Post assessment: no apparent anesthetic complications    Post vital signs: stable    Level of consciousness: awake and alert    Nausea/Vomiting: no nausea/vomiting    Complications: none    Transfer of care protocol was followed      Last vitals:   Visit Vitals  /74 (BP Location: Left arm, Patient Position: Lying)   Pulse (!) 58   Temp 36.4 °C (97.5 °F) (Oral)   Resp 13   Ht 6' 1" (1.854 m)   Wt 89.8 kg (198 lb)   SpO2 100%   BMI 26.12 kg/m²     "

## 2023-10-05 NOTE — ANESTHESIA POSTPROCEDURE EVALUATION
Anesthesia Post Evaluation    Patient: Gary Moscoso    Procedure(s) Performed: Procedure(s) (LRB):  BIOPSY, PROSTATE, USING PROSTATE MAPPING (N/A)    Final Anesthesia Type: general      Patient location during evaluation: PACU  Patient participation: Yes- Able to Participate  Level of consciousness: awake and sedated  Post-procedure vital signs: reviewed and stable  Pain management: adequate  Airway patency: patent    PONV status at discharge: No PONV  Anesthetic complications: no      Cardiovascular status: blood pressure returned to baseline  Respiratory status: unassisted  Hydration status: euvolemic  Follow-up not needed.          Vitals Value Taken Time   /88 10/05/23 1116   Temp 36.7 °C (98 °F) 10/05/23 1115   Pulse 52 10/05/23 1116   Resp 11 10/05/23 1051   SpO2 99 % 10/05/23 1115   Vitals shown include unvalidated device data.      Event Time   Out of Recovery 10:50:00         Pain/Haley Score: Haley Score: 10 (10/5/2023 11:23 AM)

## 2023-10-05 NOTE — ANESTHESIA PREPROCEDURE EVALUATION
10/05/2023  Gary Moscoso is a 53 y.o., male.      Pre-op Assessment    I have reviewed the Patient Summary Reports.     I have reviewed the Nursing Notes. I have reviewed the NPO Status.   I have reviewed the Medications.     Review of Systems  Anesthesia Hx:  No problems with previous Anesthesia    Social:  Non-Smoker, No Alcohol Use    Hematology/Oncology:  Hematology Normal   Oncology Normal     EENT/Dental:EENT/Dental Normal   Cardiovascular:   Hypertension    Pulmonary:  Pulmonary Normal    Renal/:   BPH    Hepatic/GI:   GERD    Musculoskeletal:   Arthritis  Hx AVN Hip   Neurological:  Neurology Normal    Endocrine:  Endocrine Normal    Dermatological:  Skin Normal    Psych:   anxiety          Physical Exam  General: Well nourished    Airway:  Mallampati: II / II  Mouth Opening: Normal  TM Distance: > 6 cm  Tongue: Normal  Neck ROM: Normal ROM    Chest/Lungs:  Clear to auscultation, Normal Respiratory Rate    Heart:  Rate: Normal  Rhythm: Regular Rhythm        Anesthesia Plan  Type of Anesthesia, risks & benefits discussed:    Anesthesia Type: Gen Supraglottic Airway  Intra-op Monitoring Plan: Standard ASA Monitors  Post Op Pain Control Plan: multimodal analgesia  Induction:  IV  Informed Consent: Informed consent signed with the Patient and all parties understand the risks and agree with anesthesia plan.  All questions answered.   ASA Score: 2  Day of Surgery Review of History & Physical: H&P Update referred to the surgeon/provider.I have interviewed and examined the patient. I have reviewed the patient's H&P dated: There are no significant changes. H&P completed by Anesthesiologist.    Ready For Surgery From Anesthesia Perspective.     .

## 2023-10-05 NOTE — ANESTHESIA PROCEDURE NOTES
Intubation    Date/Time: 10/5/2023 9:57 AM    Performed by: Kristofer Cabrera CRNA  Authorized by: Bertram Childers MD    Intubation:     Induction:  Intravenous    Intubated:  Postinduction    Mask Ventilation:  Easy mask    Attempts:  1    Attempted By:  CRNA    Difficult Airway Encountered?: No      Complications:  None    Airway Device:  Supraglottic airway/LMA    Airway Device Size:  4.0    Placement Verified By:  Capnometry    Complicating Factors:  None    Findings Post-Intubation:  BS equal bilateral

## 2023-10-05 NOTE — OP NOTE
10/05/2023    Preoperative diagnosis:  BPH with elevated PSA and inconclusive MRI of prostate.    Postoperative diagnosis:  Same.    Procedure:  Transrectal ultrasound with biopsy of the prostate.    Surgeon:  Kristofer Puga MD    EBL:  3 cc.    Complications:  None.    Brief clinical summary: Patient is a 53-year-old black male with an elevated PSA.  He had an MRI of the prostate that was inconclusive because he had scattering from a hip joint prosthesis.  He is now brought in for transrectal ultrasound with biopsy.  This procedure was discussed at length and in detail.  Risks, complications outcomes, sequelae, prognosis and alternative therapy discussed.  Patient understood discrete proceed.    Technique:  Patient is brought to the operative suite and placed on table in the supine position.  He was given a general LMA anesthetic which he tolerated well.  He was then placed in the left lateral decubitus position and secured to the table and all pressure points were padded.    Ultrasound probe was well lubricated.  A rectal exam was then performed.  The probe was inserted atraumatically.  The prostate is is visualized.  This is then serially look at in the transverse and longitudinal sections of the ultrasound.  There is no discrete hypoechoic areas seen.  But there are some hyperechoic areas on the extreme left lateral and right lateral areas of the prostate.    The prostate is then calculated.  The prosthetic height is 3.83 cm.  The width is 5.03 cm and the length is 4.87 cm.  The calculated volume is 49 point g.  His PSA density is elevated.  The biopsy the gun is then inserted through ultrasound and biopsies are obtained and sextant fashion.  The probe was then removed and perineal pressure was held for 5 minutes.  Patient was then rolled to the supine position and awakened from anesthesia having tolerated this procedure extremely well and sent to recovery room in stable condition.

## 2023-10-05 NOTE — BRIEF OP NOTE
[facility] - Periop Services  Brief Operative Note    Surgery Date: 10/5/2023     Surgeon(s) and Role:     * Kristofer Puga MD - Primary    Assisting Surgeon: None    Pre-op Diagnosis:  Abnormal MRI [R93.89]  Elevated PSA [R97.20]    Post-op Diagnosis:  Post-Op Diagnosis Codes:     * Abnormal MRI [R93.89]     * Elevated PSA [R97.20]    Procedure(s) (LRB):  BIOPSY, PROSTATE, USING PROSTATE MAPPING (N/A)    Anesthesia: General    Operative Findings: 10    Estimated Blood Loss: * No values recorded between 10/5/2023 10:01 AM and 10/5/2023 10:17 AM *         Specimens:   Specimen (24h ago, onward)       Start     Ordered    10/05/23 0933  Surgical Pathology  RELEASE UPON ORDERING         10/05/23 0933                      Discharge Note    OUTCOME: Patient tolerated treatment/procedure well without complication and is now ready for discharge.    DISPOSITION: Home or Self Care    FINAL DIAGNOSIS:  Elevated PSA    FOLLOWUP: In clinic      DISCHARGE INSTRUCTIONS:  No discharge procedures on file.

## 2023-10-05 NOTE — H&P
Gary Moscoso  : 1970  MRN # [id number]    UPDATE 10/5/2023:  52-year-old black male with elevated PSA.  There has been no change in his H&P since he was last seen. Due to patient's asymmetry of his prostate on exam and elevated PSA, I recommend him having transrectal ultrasound of prostate with sextant biopsies under sedation/anesthesia. I explained to the patient once again the risks, complications, outcome and sequela of the sextant biopsies of the prostate.      CHEST: Clear to auscultation  HEART:  Regular rate and rhythm  ABDOMEN: Soft, flat, nontender, no masses, no flank masses, no CVA tenderness  EXTREMITIES:  No clubbing, cyanosis, or edema  NEUROLOGIC:  Grossly intact.    Assessment:  1. Benign prostatic hyperplasia without lower urinary tract symptoms - N40.0 (Primary)  2. Elevated PSA - R97.20    Plan:  Transrectal ultrasound of prostate with sextant biopsies due to asymmetry of the prostate on exam and elevated PSA.  Patient is agreeable to planned procedure and wishes to proceed this morning.  ----------------------------------------------------------------------------------  Gary Moscoso  : 1970  MRN # [id number]  Date of service:  2023    Reason for appointment:  Elevated PSA referred by YAQUELIN Obando    History of present illness:   52-year-old black male with an elevated PSA of 6.82.  He denies voiding problems.  He says he had a urinary tract infection long time ago.  He also had a stone some years ago.  There is no family history of prostate cancer.  His urinalysis shows no hematuria, pyuria, bacteria, nitrate negative.     MRI of the prostate performed on 2023 showed a gland of 42 g.  Impression showed exam significantly limited by artifact from a total right hip arthroplasty.  Therefore PI-RADS scoring was not performed.  Findings of BPH are present.  There is no convincing overly suspicious lesion on limited available imaging.  Recommend decision of biopsy based  on PSA and clinical exam.  I recommended to the patient that he should undergo transrectal ultrasound with needle biopsies of the prostate due to his elevated PSA.  I told him that this was done as an outpatient in the hospital under sedation/anesthesia.  We use the ultrasound to map the prostate and I will do sextant biopsies.   Risks, complications, outcome, sequela and prognosis explained.  Risk and benefits gone over.  Patient understood this and agreed to proceed.   Medical history verified, allergies verified, surgical history verified, hospitalizations verified, family history verified, social history verified, and vital signs taken by Bhavya Spivey.    EXAMINATION:  General examination:  CHEST: Clear to auscultation  HEART:  Regular rate and rhythm  ABDOMEN: Soft, flat, nontender, no masses, no flank masses, no CVA tenderness.  RECTAL:  Good tone, no masses, prostate approximally 35 g.  Asymmetric, right greater than left.  No discrete nodularity.  MALE GENITOURINARY:  Normal uncircumcised penis without lesion, meatus normal, testes descended bilaterally, no testicular mass, cord structures normal, no hernia.  EXTREMITIES:  No clubbing, cyanosis, or edema  NEUROLOGIC:  Grossly intact.    Assessment:  1. Benign prostatic hyperplasia without lower urinary tract symptoms - N40.0 (Primary)  2. Elevated PSA - R97.20   3. Essential hypertension - I10  4. Hypertension - I10     Patient with BPH and elevated PSA.  No family history of prostate cancer.  His prostate is asymmetrical on exam with right greater than left.  No discrete nodularity.  Due to his asymmetry of his prostate on exam and elevated PSA, I recommended him having a MRI of the prostate.  MRI of prostate showed that the exam was significantly limited by artifact from a total right hip arthroplasty.  Therefore PI-RADS scoring was not performed.  Findings of BPH are present.  There is no convincing overly suspicious lesion on limited available  imaging.  Recommend decision of biopsy based on PSA and clinical exam.  I have recommended to the patient doing sextant biopsies of the prostate under sedation/anesthesia..   Risks, complications, outcome, sequela and prognosis explained.  Complications include but are not limited to bleeding, infection and rare hospitalization.  I answered multiple questions.  Patient understands and is agreeable to planned procedure.    Past medical history:  Aseptic necrosis of head of humerus  Cyst of epididymis  Hypertension  Hypokalemia  Lateral epicondylitis of left elbow  Nondiabetic hypoglycemia  Right artificial hip joint  Prolapse cervical intervertebral disc  MI  SMITH    Surgical history:  Arthroscopy, hip (left) 09/20/2021  Carpal tunnel release bilateral 3/15/321  Diagnostic laparoscopy 3/15/321  Inguinal hernia repair 3/15/2021  Shoulder surgery  Varicose vein surgery  Right total hip replacement    Family history:  No family history documented    Social history:  No social history documented    Allergies:  ACE inhibitors: Swelling  Lisinopril: Anaphylaxis  Codeine: N/V  Mobic: Itching    Current medication:  Xiidra 5% Soulution 1 drop into affected eye Ophthalmic twice a day  Spironolactone 25 mg tablet 1 tablet orally  K-Emily Con 20 mEq tablet extended release 1 tablet orally once a day  Nifedipine ER 30 mg tablet Extended Release 24 hours 1 tablet on an empty stomach orally once a day  Neomycin-Polymyxin-Dexameth 3.5-10,000 -0.1 Suspension 1 drop into affected eye Ophthalmic 4 times a day  Magnesium Aspartate 65 mg tablet as directed orally  Atrovent HFA 17 mcg/ACT aerosol solution 2 puffs inhalation 4 times a day  Carvedilol 12.5 mg tablet 1 tablet with food oral twice daily  Cartia  mg capsule extended release 24 hours 1 capsule orally once a day  Aspirin 81 mg  Ascorbic acid 1000 mg tablet 1 tablet orally once a day  Tramadol 50 mg tablet 1 tablet as needed orally once a day  Tadalafil 20 mg tablet 1 tablet  as needed orally once a day  Naproxen 500 mg tablet 1 tablet with food or milk as needed orally every 12 hours prn  Colchicine 0.6 mg tablet 1 tablet orally prn  Medication list reviewed and reconciled with the patient    Treatment:  1. Elevated PSA      Lab:   urine dipstick               Palmira Edwards;  Dr. Kristofer Puga reviewed with patient in clinic.      Clinical note:  Due to patient's asymmetry of his prostate on exam and elevated PSA, I recommended him having transrectal ultrasound of prostate with sextant biopsies under sedation/anesthesia.  Patient is agreeable to planned procedure.

## 2023-10-09 LAB
DHEA SERPL-MCNC: NORMAL
ESTROGEN SERPL-MCNC: NORMAL PG/ML
INSULIN SERPL-ACNC: NORMAL U[IU]/ML
LAB AP GROSS DESCRIPTION: NORMAL
LAB AP LABORATORY NOTES: NORMAL
T3RU NFR SERPL: NORMAL %

## 2023-10-19 ENCOUNTER — OFFICE VISIT (OUTPATIENT)
Dept: FAMILY MEDICINE | Facility: CLINIC | Age: 53
End: 2023-10-19
Payer: MEDICARE

## 2023-10-19 VITALS
SYSTOLIC BLOOD PRESSURE: 124 MMHG | TEMPERATURE: 98 F | HEIGHT: 73 IN | WEIGHT: 208 LBS | RESPIRATION RATE: 18 BRPM | OXYGEN SATURATION: 97 % | HEART RATE: 62 BPM | BODY MASS INDEX: 27.57 KG/M2 | DIASTOLIC BLOOD PRESSURE: 72 MMHG

## 2023-10-19 DIAGNOSIS — M54.50 ACUTE LOW BACK PAIN WITHOUT SCIATICA, UNSPECIFIED BACK PAIN LATERALITY: ICD-10-CM

## 2023-10-19 DIAGNOSIS — J32.9 SINUSITIS, UNSPECIFIED CHRONICITY, UNSPECIFIED LOCATION: Primary | ICD-10-CM

## 2023-10-19 PROCEDURE — 1159F PR MEDICATION LIST DOCUMENTED IN MEDICAL RECORD: ICD-10-PCS | Mod: ,,, | Performed by: FAMILY MEDICINE

## 2023-10-19 PROCEDURE — 3008F BODY MASS INDEX DOCD: CPT | Mod: ,,, | Performed by: FAMILY MEDICINE

## 2023-10-19 PROCEDURE — 3078F DIAST BP <80 MM HG: CPT | Mod: ,,, | Performed by: FAMILY MEDICINE

## 2023-10-19 PROCEDURE — 1160F PR REVIEW ALL MEDS BY PRESCRIBER/CLIN PHARMACIST DOCUMENTED: ICD-10-PCS | Mod: ,,, | Performed by: FAMILY MEDICINE

## 2023-10-19 PROCEDURE — 99213 PR OFFICE/OUTPT VISIT, EST, LEVL III, 20-29 MIN: ICD-10-PCS | Mod: ,,, | Performed by: FAMILY MEDICINE

## 2023-10-19 PROCEDURE — 3074F SYST BP LT 130 MM HG: CPT | Mod: ,,, | Performed by: FAMILY MEDICINE

## 2023-10-19 PROCEDURE — 1159F MED LIST DOCD IN RCRD: CPT | Mod: ,,, | Performed by: FAMILY MEDICINE

## 2023-10-19 PROCEDURE — 3078F PR MOST RECENT DIASTOLIC BLOOD PRESSURE < 80 MM HG: ICD-10-PCS | Mod: ,,, | Performed by: FAMILY MEDICINE

## 2023-10-19 PROCEDURE — 1160F RVW MEDS BY RX/DR IN RCRD: CPT | Mod: ,,, | Performed by: FAMILY MEDICINE

## 2023-10-19 PROCEDURE — 3074F PR MOST RECENT SYSTOLIC BLOOD PRESSURE < 130 MM HG: ICD-10-PCS | Mod: ,,, | Performed by: FAMILY MEDICINE

## 2023-10-19 PROCEDURE — 3008F PR BODY MASS INDEX (BMI) DOCUMENTED: ICD-10-PCS | Mod: ,,, | Performed by: FAMILY MEDICINE

## 2023-10-19 PROCEDURE — 99213 OFFICE O/P EST LOW 20 MIN: CPT | Mod: ,,, | Performed by: FAMILY MEDICINE

## 2023-10-19 RX ORDER — AZELASTINE 1 MG/ML
2 SPRAY, METERED NASAL 2 TIMES DAILY
Qty: 30 ML | Refills: 0 | Status: SHIPPED | OUTPATIENT
Start: 2023-10-19 | End: 2024-10-18

## 2023-10-19 RX ORDER — BACLOFEN 5 MG/1
5 TABLET ORAL 3 TIMES DAILY
COMMUNITY
Start: 2023-10-16 | End: 2023-11-06

## 2023-10-19 RX ORDER — FLUTICASONE PROPIONATE 50 MCG
1 SPRAY, SUSPENSION (ML) NASAL DAILY
Qty: 16 G | Refills: 0 | Status: SHIPPED | OUTPATIENT
Start: 2023-10-19

## 2023-10-19 RX ORDER — KETOROLAC TROMETHAMINE 10 MG/1
10 TABLET, FILM COATED ORAL 4 TIMES DAILY
COMMUNITY
Start: 2023-10-16

## 2023-10-19 RX ORDER — LIDOCAINE 50 MG/G
1 PATCH TOPICAL DAILY
Qty: 10 PATCH | Refills: 0 | Status: SHIPPED | OUTPATIENT
Start: 2023-10-19 | End: 2023-10-29

## 2023-10-19 NOTE — PROGRESS NOTES
Care Gaps discussed. Pt reported he received his second Shingles vaccine at [facility] in [place], will attempt to get record. Pt declined HIV and A1C screenings today.

## 2023-10-19 NOTE — Clinical Note
Pt reported he had his second Shingles vaccine at [facility] in [place], no record on [facility].

## 2023-10-25 ENCOUNTER — PATIENT OUTREACH (OUTPATIENT)
Dept: ADMINISTRATIVE | Facility: HOSPITAL | Age: 53
End: 2023-10-25
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
## 2023-10-25 NOTE — PROGRESS NOTES
Health maintenance record review for population health care gaps    Population Health Chart Review & Patient Outreach Details      Further Action Needed If Patient Returns Outreach:            Updates Requested / Reviewed:     []  Care Everywhere    []     []  External Sources ([facility], [facility], DIS, etc.)    [] [facility]   [] [facility]   [] Other:    [x]  Care Team Updated   []  Removed  or Duplicate Orders   [x]  Immunization Reconciliation Completed / Queried    [] [place]   [x] [place]   [] [place]   [] [place]      Health Maintenance Topics Addressed and Outreach Outcomes / Actions Taken:             Breast Cancer Screening []  Mammogram Order Placed    []  Mammogram Screening Scheduled    []  External Records Requested & Care Team Updated if Applicable    []  External Records Uploaded & Care Team Updated if Applicable    []  Pt Declined Scheduling Mammogram    []  Pt Will Schedule with External Provider / Order Routed & Care Team Updated if Applicable              Cervical Cancer Screening []  Pap Smear Scheduled in Primary Care or OBGYN    []  External Records Requested & Care Team Updated if Applicable       []  External Records Uploaded, Care Team Updated, & History Updated if Applicable    []  Patient Declined Scheduling Pap Smear    []  Patient Will Schedule with External Provider & Care Team Updated if Applicable                  Colorectal Cancer Screening []  Colonoscopy Case Request / Referral / Home Test Order Placed    []  External Records Requested & Care Team Updated if Applicable    []  External Records Uploaded, Care Team Updated, & History Updated if Applicable    []  Patient Declined Completing Colon Cancer Screening    []  Patient Will Schedule with External Provider & Care Team Updated if Applicable    []  Fit Kit Mailed (add the SmartPhrase under additional notes)    []  Reminded Patient to Complete Home Test                Diabetic Eye Exam []  Eye Exam Screening Order  Placed    []  Eye Camera Scheduled or Optometry/Ophthalmology Referral Placed    []  External Records Requested & Care Team Updated if Applicable    []  External Records Uploaded, Care Team Updated, & History Updated if Applicable    []  Patient Declined Scheduling Eye Exam    []  Patient Will Schedule with External Provider & Care Team Updated if Applicable             Blood Pressure Control []  Primary Care Follow Up Visit Scheduled     []  Remote Blood Pressure Reading Captured    []  Patient Declined Remote Reading or Scheduling Appt - Escalated to PCP    []  Patient Will Call Back or Send Portal Message with Reading                 HbA1c & Other Labs []  Overdue Lab(s) Ordered    []  Overdue Lab(s) Scheduled    []  External Records Uploaded & Care Team Updated if Applicable    []  Primary Care Follow Up Visit Scheduled     []  Reminded Patient to Complete A1c Home Test    []  Patient Declined Scheduling Labs or Will Call Back to Schedule    []  Patient Will Schedule with External Provider / Order Routed, & Care Team Updated if Applicable           Primary Care Appointment []  Primary Care Appt Scheduled    []  Patient Declined Scheduling or Will Call Back to Schedule    []  Pt Established with External Provider, Updated Care Team, & Informed Pt to Notify Payor if Applicable           Medication Adherence /    Statin Use []  Primary Care Appointment Scheduled    []  Patient Reminded to  Prescription    []  Patient Declined, Provider Notified if Needed    []  Sent Provider Message to Review to Evaluate Pt for Statin, Add Exclusion Dx Codes, Document   Exclusion in Problem List, Change Statin Intensity Level to Moderate or High Intensity if Applicable                Osteoporosis Screening []  Dexa Order Placed    []  Dexa Appointment Scheduled    []  External Records Requested & Care Team Updated    []  External Records Uploaded, Care Team Updated, & History Updated if Applicable    []  Patient Declined  Scheduling Dexa or Will Call Back to Schedule    []  Patient Will Schedule with External Provider / Order Routed & Care Team Updated if Applicable       Additional Notes:    ARBEN sent to [facility] Annalise Gilliam for recent vaccine

## 2023-10-25 NOTE — LETTER
AUTHORIZATION FOR RELEASE OF   CONFIDENTIAL INFORMATION    Dear [facility],    We are seeing Gary Moscoso, date of birth 1970, in the clinic at [facility]. Brit Obando NP is the patient's PCP. Gary Moscoso has an outstanding lab/procedure at the time we reviewed his chart. In order to help keep his health information updated, he has authorized us to request the following medical record(s):        (  )  MAMMOGRAM                                      (  )  COLONOSCOPY      (  )  PAP SMEAR                                          (  )  OUTSIDE LAB RESULTS     (  )  DEXA SCAN                                          (  )  EYE EXAM            (  )  FOOT EXAM                                          (  )  ENTIRE RECORD     (  x)  OUTSIDE IMMUNIZATIONS                 (  )  _______________         Please fax records to [facility], Peterson, Kristin, NP, [phone]     If you have any questions, please contact Dede Jennings Lpn-Clinic Care Coordinator at [phone].           Patient Name: Gary Moscoso  : 1970  Patient Phone #: [phone]

## 2023-10-27 ENCOUNTER — OFFICE VISIT (OUTPATIENT)
Dept: FAMILY MEDICINE | Facility: CLINIC | Age: 53
End: 2023-10-27
Payer: MEDICARE

## 2023-10-27 ENCOUNTER — TELEPHONE (OUTPATIENT)
Dept: FAMILY MEDICINE | Facility: CLINIC | Age: 53
End: 2023-10-27
Payer: MEDICARE

## 2023-10-27 VITALS
TEMPERATURE: 98 F | WEIGHT: 209 LBS | HEART RATE: 64 BPM | BODY MASS INDEX: 27.7 KG/M2 | DIASTOLIC BLOOD PRESSURE: 86 MMHG | SYSTOLIC BLOOD PRESSURE: 138 MMHG | OXYGEN SATURATION: 99 % | RESPIRATION RATE: 20 BRPM | HEIGHT: 73 IN

## 2023-10-27 DIAGNOSIS — Z00.00 ENCOUNTER FOR ANNUAL WELLNESS VISIT (AWV) IN MEDICARE PATIENT: Primary | ICD-10-CM

## 2023-10-27 DIAGNOSIS — R97.20 ELEVATED PSA: ICD-10-CM

## 2023-10-27 DIAGNOSIS — I10 ESSENTIAL HYPERTENSION: ICD-10-CM

## 2023-10-27 DIAGNOSIS — F33.9 DEPRESSION, RECURRENT: ICD-10-CM

## 2023-10-27 DIAGNOSIS — K21.9 GASTROESOPHAGEAL REFLUX DISEASE WITHOUT ESOPHAGITIS: ICD-10-CM

## 2023-10-27 DIAGNOSIS — Z91.81 HISTORY OF FALLING: ICD-10-CM

## 2023-10-27 PROBLEM — J01.40 ACUTE NON-RECURRENT PANSINUSITIS: Status: RESOLVED | Noted: 2023-08-28 | Resolved: 2023-10-27

## 2023-10-27 PROBLEM — J32.4 CHRONIC PANSINUSITIS: Status: RESOLVED | Noted: 2021-11-04 | Resolved: 2023-10-27

## 2023-10-27 PROCEDURE — 3075F PR MOST RECENT SYSTOLIC BLOOD PRESS GE 130-139MM HG: ICD-10-PCS | Mod: ,,, | Performed by: NURSE PRACTITIONER

## 2023-10-27 PROCEDURE — G0402 PR WELCOME MEDICARE PREVENTIVE VISIT NEW ENROLLEE: ICD-10-PCS | Mod: ,,, | Performed by: NURSE PRACTITIONER

## 2023-10-27 PROCEDURE — 1159F MED LIST DOCD IN RCRD: CPT | Mod: ,,, | Performed by: NURSE PRACTITIONER

## 2023-10-27 PROCEDURE — 1159F PR MEDICATION LIST DOCUMENTED IN MEDICAL RECORD: ICD-10-PCS | Mod: ,,, | Performed by: NURSE PRACTITIONER

## 2023-10-27 PROCEDURE — 3079F DIAST BP 80-89 MM HG: CPT | Mod: ,,, | Performed by: NURSE PRACTITIONER

## 2023-10-27 PROCEDURE — 3079F PR MOST RECENT DIASTOLIC BLOOD PRESSURE 80-89 MM HG: ICD-10-PCS | Mod: ,,, | Performed by: NURSE PRACTITIONER

## 2023-10-27 PROCEDURE — 3075F SYST BP GE 130 - 139MM HG: CPT | Mod: ,,, | Performed by: NURSE PRACTITIONER

## 2023-10-27 PROCEDURE — G0402 INITIAL PREVENTIVE EXAM: HCPCS | Mod: ,,, | Performed by: NURSE PRACTITIONER

## 2023-10-27 RX ORDER — ESOMEPRAZOLE MAGNESIUM 20 MG/1
20 GRANULE, DELAYED RELEASE ORAL EVERY OTHER DAY
Qty: 30 EACH | Refills: 3 | Status: SHIPPED | OUTPATIENT
Start: 2023-10-27

## 2023-10-27 NOTE — TELEPHONE ENCOUNTER
Left voice mail notifying patient his insurance denied patches(lidocaine). I spoke with patient earlier in the week and explained to him that his insurance required a prior authorization. He voiced understanding. Gave patient some options to use if insurance denied claim ex. OTC lidocaine 4% patches or pay cash for rx.

## 2023-10-27 NOTE — ASSESSMENT & PLAN NOTE
Currently not taking any meds for depression. He states he manages his depression by spending time with family or talking with others when he is feeling down. He is not interested in taking meds or counseling at this time.

## 2023-10-27 NOTE — ASSESSMENT & PLAN NOTE
He has been seen by Dr Puga and had biopsy done. Biopsy results were good. States Dr Puga told him PSA was probably elevated due to prostate just being enlarged. He is to follow up with Dr Puga in 6 months for monitoring.

## 2023-10-27 NOTE — LETTER
AUTHORIZATION FOR RELEASE OF   CONFIDENTIAL INFORMATION    Dear [facility],    We are seeing Gary Moscoso, date of birth 1970, in the clinic at [facility]. Brit Obando NP is the patient's PCP. Gary Moscoso has an outstanding lab/procedure at the time we reviewed his chart. In order to help keep his health information updated, he has authorized us to request the following medical record(s):        (  )  MAMMOGRAM                                      (  )  COLONOSCOPY      (  )  PAP SMEAR                                          (  )  OUTSIDE LAB RESULTS     (  )  DEXA SCAN                                          (  )  EYE EXAM            (  )  FOOT EXAM                                          (  )  ENTIRE RECORD     (  )  OUTSIDE IMMUNIZATIONS                 ( X )  _Shingles vaccines________         Please fax records to Brit Obando NP, [phone]     If you have any questions, please contact office at [phone].           Patient Name: Gary Moscoso  : 1970  Patient Phone #: [phone]

## 2023-10-27 NOTE — PATIENT INSTRUCTIONS
Counseling and Referral of Other Preventative  (Italic type indicates deductible and co-insurance are waived)    Patient Name: Gary Moscoso  Today's Date: 10/27/2023    Health Maintenance       Date Due Completion Date    HIV Screening Never done ---    Hemoglobin A1c (Diabetic Prevention Screening) Never done ---    Shingles Vaccine (2 of 2) 07/28/2022 6/2/2022    COVID-19 Vaccine (6 - 2023-24 season) 11/20/2023 9/25/2023    Lipid Panel 07/06/2024 7/6/2023    Colorectal Cancer Screening 06/24/2026 6/24/2021    TETANUS VACCINE 04/30/2028 4/30/2018        No orders of the defined types were placed in this encounter.      Counseling and Referral of Other Preventative  (Italic type indicates deductible and co-insurance are waived)    Patient Name: Gary Moscoso  Today's Date: 10/27/2023    Health Maintenance       Date Due Completion Date    HIV Screening Never done ---    Hemoglobin A1c (Diabetic Prevention Screening) Never done ---    Shingles Vaccine (2 of 2) 07/28/2022 6/2/2022    COVID-19 Vaccine (6 - 2023-24 season) 11/20/2023 9/25/2023    Lipid Panel 07/06/2024 7/6/2023    Colorectal Cancer Screening 06/24/2026 6/24/2021    TETANUS VACCINE 04/30/2028 4/30/2018        No orders of the defined types were placed in this encounter.      The following information is provided to all patients.  This information is to help you find resources for any of the problems found today that may be affecting your health:                Living healthy guide: www.[facility].[place].gov      Understanding Diabetes: www.diabetes.org      Eating healthy: www.cdc.gov/healthyweight      CDC home safety checklist: www.cdc.gov/steadi/patient.html      Agency on Aging: www.goea.[place].gov      Alcoholics anonymous (AA): www.aa.org      Physical Activity: www.[place]anna.nih.gov/sv3npxx      Tobacco use: www.quitwithusla.org

## 2023-11-06 ENCOUNTER — APPOINTMENT (OUTPATIENT)
Dept: RADIOLOGY | Facility: CLINIC | Age: 53
End: 2023-11-06
Attending: NURSE PRACTITIONER
Payer: MEDICARE

## 2023-11-06 ENCOUNTER — OFFICE VISIT (OUTPATIENT)
Dept: FAMILY MEDICINE | Facility: CLINIC | Age: 53
End: 2023-11-06
Payer: MEDICARE

## 2023-11-06 VITALS
DIASTOLIC BLOOD PRESSURE: 82 MMHG | HEIGHT: 73 IN | RESPIRATION RATE: 18 BRPM | WEIGHT: 208.81 LBS | TEMPERATURE: 98 F | BODY MASS INDEX: 27.67 KG/M2 | OXYGEN SATURATION: 98 % | SYSTOLIC BLOOD PRESSURE: 134 MMHG | HEART RATE: 66 BPM

## 2023-11-06 DIAGNOSIS — M54.42 ACUTE LEFT-SIDED LOW BACK PAIN WITH LEFT-SIDED SCIATICA: ICD-10-CM

## 2023-11-06 DIAGNOSIS — M54.9 DORSALGIA, UNSPECIFIED: ICD-10-CM

## 2023-11-06 DIAGNOSIS — M25.552 PAIN OF LEFT HIP: ICD-10-CM

## 2023-11-06 DIAGNOSIS — M54.16 LUMBAR RADICULOPATHY, CHRONIC: ICD-10-CM

## 2023-11-06 DIAGNOSIS — M54.50 ACUTE LEFT-SIDED LOW BACK PAIN WITHOUT SCIATICA: ICD-10-CM

## 2023-11-06 DIAGNOSIS — M25.512 ACUTE PAIN OF LEFT SHOULDER: Primary | ICD-10-CM

## 2023-11-06 DIAGNOSIS — J01.40 ACUTE NON-RECURRENT PANSINUSITIS: ICD-10-CM

## 2023-11-06 DIAGNOSIS — M54.2 NECK PAIN ON LEFT SIDE: ICD-10-CM

## 2023-11-06 PROCEDURE — 99214 PR OFFICE/OUTPT VISIT, EST, LEVL IV, 30-39 MIN: ICD-10-PCS | Mod: 25,,, | Performed by: NURSE PRACTITIONER

## 2023-11-06 PROCEDURE — 3075F SYST BP GE 130 - 139MM HG: CPT | Mod: ,,, | Performed by: NURSE PRACTITIONER

## 2023-11-06 PROCEDURE — 3008F BODY MASS INDEX DOCD: CPT | Mod: ,,, | Performed by: NURSE PRACTITIONER

## 2023-11-06 PROCEDURE — 3079F PR MOST RECENT DIASTOLIC BLOOD PRESSURE 80-89 MM HG: ICD-10-PCS | Mod: ,,, | Performed by: NURSE PRACTITIONER

## 2023-11-06 PROCEDURE — 72100 X-RAY EXAM L-S SPINE 2/3 VWS: CPT | Mod: TC,RHCUB,FY | Performed by: NURSE PRACTITIONER

## 2023-11-06 PROCEDURE — 96372 PR INJECTION,THERAP/PROPH/DIAG2ST, IM OR SUBCUT: ICD-10-PCS | Mod: ,,, | Performed by: NURSE PRACTITIONER

## 2023-11-06 PROCEDURE — 99214 OFFICE O/P EST MOD 30 MIN: CPT | Mod: 25,,, | Performed by: NURSE PRACTITIONER

## 2023-11-06 PROCEDURE — 1160F PR REVIEW ALL MEDS BY PRESCRIBER/CLIN PHARMACIST DOCUMENTED: ICD-10-PCS | Mod: ,,, | Performed by: NURSE PRACTITIONER

## 2023-11-06 PROCEDURE — 3008F PR BODY MASS INDEX (BMI) DOCUMENTED: ICD-10-PCS | Mod: ,,, | Performed by: NURSE PRACTITIONER

## 2023-11-06 PROCEDURE — 3079F DIAST BP 80-89 MM HG: CPT | Mod: ,,, | Performed by: NURSE PRACTITIONER

## 2023-11-06 PROCEDURE — 1159F PR MEDICATION LIST DOCUMENTED IN MEDICAL RECORD: ICD-10-PCS | Mod: ,,, | Performed by: NURSE PRACTITIONER

## 2023-11-06 PROCEDURE — 1160F RVW MEDS BY RX/DR IN RCRD: CPT | Mod: ,,, | Performed by: NURSE PRACTITIONER

## 2023-11-06 PROCEDURE — 96372 THER/PROPH/DIAG INJ SC/IM: CPT | Mod: ,,, | Performed by: NURSE PRACTITIONER

## 2023-11-06 PROCEDURE — 3075F PR MOST RECENT SYSTOLIC BLOOD PRESS GE 130-139MM HG: ICD-10-PCS | Mod: ,,, | Performed by: NURSE PRACTITIONER

## 2023-11-06 PROCEDURE — 1159F MED LIST DOCD IN RCRD: CPT | Mod: ,,, | Performed by: NURSE PRACTITIONER

## 2023-11-06 RX ORDER — GABAPENTIN 100 MG/1
100 CAPSULE ORAL 3 TIMES DAILY
Qty: 90 CAPSULE | Refills: 1 | Status: SHIPPED | OUTPATIENT
Start: 2023-11-06 | End: 2024-11-05

## 2023-11-06 RX ORDER — CYCLOBENZAPRINE HCL 5 MG
5 TABLET ORAL 3 TIMES DAILY PRN
Qty: 30 TABLET | Refills: 0 | Status: SHIPPED | OUTPATIENT
Start: 2023-11-06 | End: 2023-11-16

## 2023-11-06 RX ORDER — KETOROLAC TROMETHAMINE 30 MG/ML
30 INJECTION, SOLUTION INTRAMUSCULAR; INTRAVENOUS
Status: COMPLETED | OUTPATIENT
Start: 2023-11-06 | End: 2023-11-06

## 2023-11-06 RX ORDER — AZITHROMYCIN 250 MG/1
TABLET, FILM COATED ORAL
Qty: 6 TABLET | Refills: 0 | Status: SHIPPED | OUTPATIENT
Start: 2023-11-06 | End: 2023-11-11

## 2023-11-06 RX ORDER — METHYLPREDNISOLONE 4 MG/1
TABLET ORAL
Qty: 21 EACH | Refills: 0 | Status: SHIPPED | OUTPATIENT
Start: 2023-11-06 | End: 2023-11-27

## 2023-11-06 RX ADMIN — KETOROLAC TROMETHAMINE 30 MG: 30 INJECTION, SOLUTION INTRAMUSCULAR; INTRAVENOUS at 04:11
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
## 2023-11-10 PROBLEM — M25.512 ACUTE PAIN OF LEFT SHOULDER: Status: ACTIVE | Noted: 2023-11-10

## 2023-11-10 PROBLEM — M54.2 NECK PAIN ON LEFT SIDE: Status: ACTIVE | Noted: 2023-11-10

## 2023-11-10 PROBLEM — M54.42 ACUTE LEFT-SIDED LOW BACK PAIN WITH LEFT-SIDED SCIATICA: Status: ACTIVE | Noted: 2023-11-10

## 2023-11-10 PROBLEM — M25.552 PAIN OF LEFT HIP: Status: ACTIVE | Noted: 2020-08-12
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
## 2023-11-10 NOTE — ASSESSMENT & PLAN NOTE
Pain in left lower back with radiation down to buttocks. Xray showed degenerative changes. Has tried OTC pain relievers with little relief. Toradol IM given in clinic. Medrol Dose Pack as ordered and Flexeril as needed. Will proceed with ordering MRI of lower back as I fear he may have some disc issues due to radiation of pain and lack of improvement with conservative measures.      Patient was instructed to rest, take medications as directed, alternate ice/moist heat to area, and monitor for worsening symptoms that require immediate evaluation. Patient was instructed to follow up if symptoms persist past current treatment plan to discuss further options, such as physical therapy, referral to ortho or other diagnostic imaging. Medication side effects/risk/benefits/directions on taking medications were reviewed with patient. Patient verbalized understanding of treatment plan and denies any questions.

## 2023-11-22 ENCOUNTER — TELEPHONE (OUTPATIENT)
Dept: FAMILY MEDICINE | Facility: CLINIC | Age: 53
End: 2023-11-22
Payer: MEDICARE

## 2023-11-22 DIAGNOSIS — S73.192S TEAR OF LEFT ACETABULAR LABRUM, SEQUELA: ICD-10-CM

## 2023-11-22 RX ORDER — TRAMADOL HYDROCHLORIDE 50 MG/1
50 TABLET ORAL EVERY 6 HOURS PRN
Qty: 27 TABLET | Refills: 0 | Status: SHIPPED | OUTPATIENT
Start: 2023-11-22 | End: 2023-11-29

## 2023-11-22 NOTE — TELEPHONE ENCOUNTER
"Pt called c/o persistent low back and left shoulder pain that has not been relieved by the medrol dose pack that he has completed or the flexeril or naproxen that he has been taking. Pt requested a "stronger medication or a shot". He does have a MRI scheduled for 11/24/2023. Notified Pt that his request would be discussed with YAQUELIN Paris and he would be called back.   "

## 2023-11-22 NOTE — TELEPHONE ENCOUNTER
I have sent in some Tramadol for him to take as needed for short term use. We will see what imaging shows and then refer him to orthospine or pain management depending of findings.

## 2023-11-24 ENCOUNTER — HOSPITAL ENCOUNTER (OUTPATIENT)
Dept: RADIOLOGY | Facility: HOSPITAL | Age: 53
Discharge: HOME | End: 2023-11-24
Attending: NURSE PRACTITIONER
Payer: MEDICARE

## 2023-11-24 DIAGNOSIS — M54.16 LUMBAR RADICULOPATHY, CHRONIC: ICD-10-CM

## 2023-11-24 DIAGNOSIS — M25.512 ACUTE PAIN OF LEFT SHOULDER: ICD-10-CM

## 2023-11-24 DIAGNOSIS — M54.9 DORSALGIA, UNSPECIFIED: ICD-10-CM

## 2023-11-24 PROCEDURE — 72148 MRI LUMBAR SPINE W/O DYE: CPT | Mod: TC

## 2023-11-24 PROCEDURE — 73221 MRI JOINT UPR EXTREM W/O DYE: CPT | Mod: TC,LT

## 2023-11-27 ENCOUNTER — TELEPHONE (OUTPATIENT)
Dept: FAMILY MEDICINE | Facility: CLINIC | Age: 53
End: 2023-11-27
Payer: MEDICARE

## 2023-11-27 DIAGNOSIS — M51.36 BULGING LUMBAR DISC: Primary | ICD-10-CM
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
## 2023-11-27 NOTE — PROGRESS NOTES
MRI left shoulder reviewed: It showed previous injury and surgery that was done. Otherwise no acute findings. If he continues to have pain we can refer to Ortho and see if there is anything they can do. He may want to see whoever did his first surgery. Just let me know. Thanks.

## 2023-11-27 NOTE — TELEPHONE ENCOUNTER
Spoke with patient about his MRI for his shoulder and lumbar spine. Patient states his shoulder is feeling better and will wait on orthopedic referral if pain continues. Regarding MRI L spine- Patient wants referral to specialist but will call back with preferred provider after discussing with his wife.

## 2023-11-27 NOTE — TELEPHONE ENCOUNTER
MRI lumbar spine reviewed: It showed some disc bulging and degenerative changes worst at L5-S1. Next steps would be referral to OrthoSpine. We have Dr Colon at [facility] or if he would like to go elsewhere let me know. Thanks.
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
## 2024-01-24 ENCOUNTER — TELEPHONE (OUTPATIENT)
Dept: FAMILY MEDICINE | Facility: CLINIC | Age: 54
End: 2024-01-24
Payer: MEDICARE

## 2024-01-24 DIAGNOSIS — M51.36 BULGING LUMBAR DISC: Primary | ICD-10-CM

## 2024-01-24 NOTE — TELEPHONE ENCOUNTER
Contacted Pt about this referral, he is okay with being referred to a spine specialist in the [place] area such as [facility]. Notified Pt that a new referral would be put in.

## 2024-01-24 NOTE — TELEPHONE ENCOUNTER
----- Message from Shannen Gonzalez sent at 1/24/2024  1:14 PM CST -----  Perlita from Dr. Colon office in [place] called and stated that the referral  we put in that he will have to go somewhere lese because he has an  involved and he will not go forth with seeing Mr. Danis sánchez to that reason. However Mr. Vega would still like for us to put a referral in somewhere else  for him to go

## 2024-02-09 ENCOUNTER — OFFICE VISIT (OUTPATIENT)
Dept: FAMILY MEDICINE | Facility: CLINIC | Age: 54
End: 2024-02-09
Payer: MEDICARE

## 2024-02-09 VITALS
DIASTOLIC BLOOD PRESSURE: 86 MMHG | WEIGHT: 215.38 LBS | TEMPERATURE: 98 F | HEART RATE: 63 BPM | SYSTOLIC BLOOD PRESSURE: 138 MMHG | HEIGHT: 73 IN | BODY MASS INDEX: 28.54 KG/M2 | RESPIRATION RATE: 18 BRPM | OXYGEN SATURATION: 97 %

## 2024-02-09 DIAGNOSIS — R05.9 COUGH, UNSPECIFIED TYPE: ICD-10-CM

## 2024-02-09 DIAGNOSIS — Z71.89 COMPLEX CARE COORDINATION: ICD-10-CM

## 2024-02-09 DIAGNOSIS — J01.00 ACUTE MAXILLARY SINUSITIS, RECURRENCE NOT SPECIFIED: Primary | ICD-10-CM

## 2024-02-09 DIAGNOSIS — R09.81 NASAL CONGESTION: ICD-10-CM

## 2024-02-09 LAB
CTP QC/QA: YES
CTP QC/QA: YES
POC MOLECULAR INFLUENZA A AGN: NEGATIVE
POC MOLECULAR INFLUENZA B AGN: NEGATIVE
SARS-COV-2 RDRP RESP QL NAA+PROBE: NEGATIVE

## 2024-02-09 PROCEDURE — 1159F MED LIST DOCD IN RCRD: CPT | Mod: ,,,

## 2024-02-09 PROCEDURE — 87635 SARS-COV-2 COVID-19 AMP PRB: CPT | Mod: RHCUB

## 2024-02-09 PROCEDURE — 96372 THER/PROPH/DIAG INJ SC/IM: CPT | Mod: ,,,

## 2024-02-09 PROCEDURE — 99213 OFFICE O/P EST LOW 20 MIN: CPT | Mod: 25,,,

## 2024-02-09 PROCEDURE — 3079F DIAST BP 80-89 MM HG: CPT | Mod: ,,,

## 2024-02-09 PROCEDURE — 3075F SYST BP GE 130 - 139MM HG: CPT | Mod: ,,,

## 2024-02-09 PROCEDURE — 3008F BODY MASS INDEX DOCD: CPT | Mod: ,,,

## 2024-02-09 PROCEDURE — 1160F RVW MEDS BY RX/DR IN RCRD: CPT | Mod: ,,,

## 2024-02-09 PROCEDURE — 87502 INFLUENZA DNA AMP PROBE: CPT | Mod: RHCUB

## 2024-02-09 RX ORDER — AZITHROMYCIN 250 MG/1
TABLET, FILM COATED ORAL
Qty: 6 TABLET | Refills: 0 | Status: SHIPPED | OUTPATIENT
Start: 2024-02-09 | End: 2024-02-14

## 2024-02-09 RX ORDER — DEXAMETHASONE SODIUM PHOSPHATE 4 MG/ML
4 INJECTION, SOLUTION INTRA-ARTICULAR; INTRALESIONAL; INTRAMUSCULAR; INTRAVENOUS; SOFT TISSUE
Status: COMPLETED | OUTPATIENT
Start: 2024-02-09 | End: 2024-02-09

## 2024-02-09 RX ORDER — CEFTRIAXONE 1 G/1
1 INJECTION, POWDER, FOR SOLUTION INTRAMUSCULAR; INTRAVENOUS
Status: COMPLETED | OUTPATIENT
Start: 2024-02-09 | End: 2024-02-09

## 2024-02-09 RX ORDER — METHOCARBAMOL 750 MG/1
750 TABLET, FILM COATED ORAL EVERY 8 HOURS PRN
COMMUNITY
Start: 2023-12-18

## 2024-02-09 RX ORDER — MAGNESIUM 64 MG (MAGNESIUM CHLORIDE) TABLET,DELAYED RELEASE
64 2 TIMES DAILY
COMMUNITY
Start: 2023-12-20

## 2024-02-09 RX ADMIN — CEFTRIAXONE 1 G: 1 INJECTION, POWDER, FOR SOLUTION INTRAMUSCULAR; INTRAVENOUS at 03:02

## 2024-02-09 RX ADMIN — DEXAMETHASONE SODIUM PHOSPHATE 4 MG: 4 INJECTION, SOLUTION INTRA-ARTICULAR; INTRALESIONAL; INTRAMUSCULAR; INTRAVENOUS; SOFT TISSUE at 03:02

## 2024-02-09 NOTE — PROGRESS NOTES
02/09/24 1358   Depression Patient Health Questionnaire (PHQ-2)   Over the last two weeks how often have you been bothered by little interest or pleasure in doing things 0   Over the last two weeks how often have you been bothered by feeling down, depressed or hopeless 0   PHQ-2 Total Score 0   Depression Patient Health Questionnaire (PHQ-9)   Over the last two weeks how often have you been bothered by trouble falling or staying asleep, or sleeping too much 0   Over the last two weeks how often have you been bothered by feeling tired or having little energy 1   Over the last two weeks how often have you been bothered by a poor appetite or overeating 0   Over the last two weeks how often have you been bothered by feeling bad about yourself - or that you are a failure or have let yourself or your family down 0   Over the last two weeks how often have you been bothered by trouble concentrating on things, such as reading the newspaper or watching television 0   Over the last two weeks how often have you been bothered by moving or speaking so slowly that other people could have noticed. Or the opposite - being so fidgety or restless that you have been moving around a lot more than usual. 0   Over the last two weeks how often have you been bothered by thoughts that you would be better off dead, or of hurting yourself 0   If you checked off any problems, how difficult have these problems made it for you to do your work, take care of things at home or get along with other people? Not difficult at all   PHQ-9 Score 1   PHQ-9 Interpretation Minimal or None

## 2024-02-14 PROBLEM — R09.81 NASAL CONGESTION: Status: ACTIVE | Noted: 2024-02-14

## 2024-02-14 PROBLEM — J01.00 ACUTE MAXILLARY SINUSITIS: Status: ACTIVE | Noted: 2024-02-14

## 2024-02-14 PROBLEM — R05.9 COUGH: Status: ACTIVE | Noted: 2024-02-14

## 2024-02-14 NOTE — PROGRESS NOTES
MARLA CONRAD, YAQUELIN   [facility]  [address]  [place]  [address]      PATIENT NAME: Gary Moscoso  : 1970  DATE: 24  MRN: [id number]        Reason for Visit / Chief Complaint: Cough (Reports a dry cough that started this morning. ), Nasal Congestion (Started last week. ), Headache (Was hurting earlier today. ), and Otalgia (Right ear pain since last week. )         History of Present Illness / Problem Focused Workflow     52 y/o male presents to clinic with complaints of cough, congestion, HA, otalgia. States symptoms started last week. Denies any fever, chills, wheezing, SOB, palpitations, GI symptoms. Has not taken any OTC medications to relieve symptoms.       Review of Systems     @Review of Systems   Constitutional:  Negative for chills, fatigue and fever.   HENT:  Positive for nasal congestion and ear pain. Negative for ear discharge, rhinorrhea, sinus pressure/congestion and sore throat.    Respiratory:  Positive for cough. Negative for chest tightness, shortness of breath, wheezing and stridor.    Cardiovascular:  Negative for palpitations and claudication.   Gastrointestinal:  Negative for abdominal pain, constipation, diarrhea, nausea, vomiting and reflux.   Genitourinary:  Negative for dysuria, flank pain, frequency, hematuria and urgency.   Musculoskeletal:  Negative for myalgias.   Neurological:  Positive for headaches. Negative for dizziness, weakness and light-headedness.   Psychiatric/Behavioral:  Negative for suicidal ideas.        Medical / Social / Family History     Past Medical History:   Diagnosis Date    Aseptic necrosis of head of humerus 2013    Cyst of epididymis 2021    Degeneration of lumbar intervertebral disc 2013    Depression     Encounter for examination for driving license 2018    GERD (gastroesophageal reflux disease)     Hypertension     Hypokalemia     Lateral epicondylitis, left elbow 2020    Myocardial  infarction 2020    pt reports    Non-diabetic hypoglycemia 01/10/2012    Presence of right artificial hip joint 02/04/2016    Prolapsed cervical intervertebral disc 05/09/2013    without myelopathy       Past Surgical History:   Procedure Laterality Date    ARTHROSCOPY, HIP Left 9/20/2021    Procedure: ARTHROSCOPY, HIP, WITH LABRUM REPAIR;  Surgeon: Charles Yeh MD;  Location: [facility] OR;  Service: Orthopedics;  Laterality: Left;    ARTHROSCOPY, HIP Left 9/20/2021    Procedure: ARTHROSCOPY, HIP, WITH FEMOROPLASTY;  Surgeon: Charles Yeh MD;  Location: [facility] OR;  Service: Orthopedics;  Laterality: Left;    ARTHROSCOPY, HIP Left 9/20/2021    Procedure: ARTHROSCOPY, HIP, WITH ACETABULOPLASTY, WITH REPAIR OF LABRUM IF INDICATED;  Surgeon: Charles Yeh MD;  Location: [facility];  Service: Orthopedics;  Laterality: Left;    BIOPSY, PROSTATE, USING PROSTATE MAPPING N/A 10/5/2023    Procedure: BIOPSY, PROSTATE, USING PROSTATE MAPPING;  Surgeon: Kristofer Puga MD;  Location: [facility];  Service: Urology;  Laterality: N/A;    CARPAL TUNNEL RELEASE      DIAGNOSTIC LAPAROSCOPY N/A 3/15/2021    Procedure: LAPAROSCOPY, DIAGNOSTIC;  Surgeon: Jay Mclain MD;  Location: [facility];  Service: General;  Laterality: N/A;  1052 FAMILY INFORMED OF SURGERY START  1130 DR MCLAIN TALKED TO PATIENT FAMILY    HIP SURGERY      inguinal hernia reparir  03/05/2021    Dr. Robert Mclain    ROBOT-ASSISTED LAPAROSCOPIC REPAIR OF INGUINAL HERNIA USING DA JALEN XI Bilateral 3/15/2021    Procedure: XI ROBOTIC INGUINAL HERNIA REPAIRS WITH MESH;  Surgeon: Jay Mclain MD;  Location: [facility];  Service: General;  Laterality: Bilateral;    SHOULDER SURGERY      VARICOSE VEIN SURGERY             Medications and Allergies     Medications  Outpatient Medications Marked as Taking for the 2/9/24 encounter (Office Visit) with Adebayo Cox FNP   Medication Sig Dispense Refill    ascorbic acid, vitamin C, (VITAMIN C)  100 MG tablet Take 100 mg by mouth once daily.      CARTIA  mg 24 hr capsule Take 240 mg by mouth once daily.      carvediloL (COREG) 12.5 MG tablet Take 12.5 mg by mouth 2 (two) times daily.      colchicine (COLCRYS) 0.6 mg tablet Take 1 tablet (0.6 mg total) by mouth as needed (gout flare up). 30 tablet 0    esomeprazole (NEXIUM) 20 mg GrPS Take 20 mg by mouth every other day. 30 each 3    fluticasone propionate (FLONASE) 50 mcg/actuation nasal spray 1 spray (50 mcg total) by Each Nostril route once daily. 16 g 0    gabapentin (NEURONTIN) 100 MG capsule Take 1 capsule (100 mg total) by mouth 3 (three) times daily. 90 capsule 1    ketorolac (TORADOL) 10 mg tablet Take 10 mg by mouth 4 (four) times daily.      MAG 64 64 mg TbEC Take 64 mg by mouth 2 (two) times daily.      methocarbamoL (ROBAXIN) 750 MG Tab Take 750 mg by mouth every 8 (eight) hours as needed (pain).      neomycin-polymyxin-dexamethasone (DEXACINE) 3.5 mg/g-10,000 unit/g-0.1 % Oint SMARTSIG:Sparingly In Eye(s) Every Night      NIFEdipine (ADALAT CC) 30 MG TbSR Take 30 mg by mouth 2 (two) times a day.      potassium chloride SA (K-DUR,KLOR-CON) 20 MEQ tablet Take 20 mEq by mouth 2 (two) times daily.      spironolactone (ALDACTONE) 25 MG tablet Take 25 mg by mouth once daily.      tadalafiL (CIALIS) 20 MG Tab Take 20 mg by mouth daily as needed.      XIIDRA 5 % Dpet Instill 1 drop into both eyes twice a day as directed         Allergies  Review of patient's allergies indicates:   Allergen Reactions    Ace inhibitors Swelling    Lisinopril Anaphylaxis    Codeine Nausea And Vomiting    Mobic [meloxicam] Itching       Physical Examination     Vitals:    02/09/24 1347   BP: 138/86   Pulse: 63   Resp: 18   Temp: 97.9 °F (36.6 °C)     Physical Exam  Vitals and nursing note reviewed.   Constitutional:       General: He is awake.      Appearance: Normal appearance.   HENT:      Head: Normocephalic.      Right Ear: Tympanic membrane, ear canal and  external ear normal.      Left Ear: Tympanic membrane, ear canal and external ear normal.      Nose:      Right Sinus: Maxillary sinus tenderness present.      Left Sinus: Maxillary sinus tenderness present.      Mouth/Throat:      Lips: Pink.      Mouth: Mucous membranes are moist.      Pharynx: Oropharynx is clear. Uvula midline. Posterior oropharyngeal erythema present.   Cardiovascular:      Rate and Rhythm: Normal rate and regular rhythm.      Heart sounds: Normal heart sounds, S1 normal and S2 normal.   Pulmonary:      Effort: Pulmonary effort is normal. No respiratory distress.      Breath sounds: Normal breath sounds. No decreased breath sounds, wheezing, rhonchi or rales.   Abdominal:      General: Bowel sounds are normal.      Palpations: Abdomen is soft.      Tenderness: There is no abdominal tenderness.   Musculoskeletal:      Cervical back: Normal range of motion.   Lymphadenopathy:      Cervical: No cervical adenopathy.   Skin:     General: Skin is warm.      Capillary Refill: Capillary refill takes less than 2 seconds.   Neurological:      Mental Status: He is alert and oriented to person, place, and time.   Psychiatric:         Thought Content: Thought content does not include homicidal or suicidal ideation. Thought content does not include homicidal or suicidal plan.               Procedures   Assessment and Plan (including Health Maintenance)   :    Plan:     Problem List Items Addressed This Visit          ENT    Acute maxillary sinusitis - Primary    Current Assessment & Plan     Rocephin IM and Decadron IM today. Azithromycin RX. Medication instructions and education given with understanding voiced. Rest, increase fluids. OTC antihistamines, decongestants, Ibuprofen, Tylenol as needed. RTC with worsening, new or persistent symptoms.           Relevant Medications    azithromycin (Z-LENNOX) 250 MG tablet    Nasal congestion    Current Assessment & Plan     Rapid strep, flu and covid negative          Relevant Orders    POCT COVID-19 Rapid Screening (Completed)    POCT Influenza A/B Molecular (Completed)       Pulmonary    Cough    Current Assessment & Plan     Rapid strep, flu and covid negative         Relevant Orders    POCT COVID-19 Rapid Screening (Completed)    POCT Influenza A/B Molecular (Completed)       Health Maintenance Topics with due status: Not Due       Topic Last Completion Date    TETANUS VACCINE 04/30/2018    Colorectal Cancer Screening 06/24/2021    Lipid Panel 07/06/2023       Future Appointments   Date Time Provider Department Center   11/21/2024  9[facility] [place] [facility]        Health Maintenance Due   Topic Date Due    HIV Screening  Never done    Hemoglobin A1c (Diabetic Prevention Screening)  Never done    Shingles Vaccine (2 of 2) 07/28/2022    COVID-19 Vaccine (6 - 2023-24 season) 11/20/2023        Follow up if symptoms worsen or fail to improve.     Signature:  YAQUELIN CLARK [facility]  [address] [place] [address], [place]  [address]    Date of encounter: 2/9/24

## 2024-04-22 ENCOUNTER — OFFICE VISIT (OUTPATIENT)
Dept: FAMILY MEDICINE | Facility: CLINIC | Age: 54
End: 2024-04-22
Payer: MEDICARE

## 2024-04-22 VITALS
HEART RATE: 63 BPM | WEIGHT: 217 LBS | TEMPERATURE: 98 F | SYSTOLIC BLOOD PRESSURE: 135 MMHG | HEIGHT: 73 IN | RESPIRATION RATE: 19 BRPM | BODY MASS INDEX: 28.76 KG/M2 | OXYGEN SATURATION: 98 % | DIASTOLIC BLOOD PRESSURE: 83 MMHG

## 2024-04-22 DIAGNOSIS — M25.552 PAIN OF LEFT HIP: Primary | ICD-10-CM

## 2024-04-22 DIAGNOSIS — J01.00 ACUTE MAXILLARY SINUSITIS, RECURRENCE NOT SPECIFIED: ICD-10-CM

## 2024-04-22 PROCEDURE — 3079F DIAST BP 80-89 MM HG: CPT | Mod: ,,,

## 2024-04-22 PROCEDURE — 3008F BODY MASS INDEX DOCD: CPT | Mod: ,,,

## 2024-04-22 PROCEDURE — 1160F RVW MEDS BY RX/DR IN RCRD: CPT | Mod: ,,,

## 2024-04-22 PROCEDURE — 1159F MED LIST DOCD IN RCRD: CPT | Mod: ,,,

## 2024-04-22 PROCEDURE — 99213 OFFICE O/P EST LOW 20 MIN: CPT | Mod: 25,,,

## 2024-04-22 PROCEDURE — 96372 THER/PROPH/DIAG INJ SC/IM: CPT | Mod: ,,,

## 2024-04-22 PROCEDURE — 3075F SYST BP GE 130 - 139MM HG: CPT | Mod: ,,,

## 2024-04-22 RX ORDER — AMOXICILLIN AND CLAVULANATE POTASSIUM 875; 125 MG/1; MG/1
1 TABLET, FILM COATED ORAL EVERY 12 HOURS
Qty: 20 TABLET | Refills: 0 | Status: SHIPPED | OUTPATIENT
Start: 2024-04-22 | End: 2024-05-02

## 2024-04-22 RX ORDER — CEFTRIAXONE 1 G/1
1 INJECTION, POWDER, FOR SOLUTION INTRAMUSCULAR; INTRAVENOUS
Status: COMPLETED | OUTPATIENT
Start: 2024-04-22 | End: 2024-04-22

## 2024-04-22 RX ORDER — DICLOFENAC SODIUM 75 MG/1
75 TABLET, DELAYED RELEASE ORAL 2 TIMES DAILY
Qty: 30 TABLET | Refills: 1 | Status: SHIPPED | OUTPATIENT
Start: 2024-04-22

## 2024-04-22 RX ORDER — DEXAMETHASONE SODIUM PHOSPHATE 4 MG/ML
4 INJECTION, SOLUTION INTRA-ARTICULAR; INTRALESIONAL; INTRAMUSCULAR; INTRAVENOUS; SOFT TISSUE
Status: COMPLETED | OUTPATIENT
Start: 2024-04-22 | End: 2024-04-22

## 2024-04-22 RX ADMIN — DEXAMETHASONE SODIUM PHOSPHATE 4 MG: 4 INJECTION, SOLUTION INTRA-ARTICULAR; INTRALESIONAL; INTRAMUSCULAR; INTRAVENOUS; SOFT TISSUE at 04:04

## 2024-04-22 RX ADMIN — CEFTRIAXONE 1 G: 1 INJECTION, POWDER, FOR SOLUTION INTRAMUSCULAR; INTRAVENOUS at 04:04

## 2024-04-22 NOTE — PROGRESS NOTES
Health Maintenance Due   Topic Date Due    HIV Screening  Never done    Hemoglobin A1c (Diabetic Prevention Screening)  Never done    Shingles Vaccine (2 of 2) 07/28/2022    COVID-19 Vaccine (6 - 2023-24 season) 11/20/2023     I discussed care gaps with patient and he declined at this time.

## 2024-04-22 NOTE — PROGRESS NOTES
MARLA CONRAD, YAQUELIN   [facility]  [address]  [place]  [address]      PATIENT NAME: Gary Moscoso  : 1970  DATE: 24  MRN: [id number]        Reason for Visit / Chief Complaint: Sinus Problem (Patient is Gary Moscoso a 54 y/o male that is here for sinus problems. He reports that his nose is congested and can barely get anything out when he blows it, he does have some clear mucus occasionally. He states he feels fatigued and has been sneezing a lot and having itchy eyes and throat. He denies any fever or cough. He also reports occasional pain in right ear. He has tried OTC Coricidin HBP with no relief.) and Medication Refill (Patient would like an rx for naproxen.)         History of Present Illness / Problem Focused Workflow     54 y/o male presents to clinic for sinus problems. He reports that his nose is congested and can barely get anything out when he blows it, he does have some clear mucus occasionally. He states he feels fatigued and has been sneezing a lot and having itchy eyes and throat. He denies any fever or cough. He also reports occasional pain in right ear. He has tried OTC Coricidin HBP with no relief.) and Medication Refill (Patient would like an rx for naproxen.      Review of Systems     @Review of Systems   Constitutional:  Negative for chills, fatigue and fever.   HENT:  Positive for sinus pressure/congestion and sore throat. Negative for nasal congestion, ear discharge, ear pain and rhinorrhea.    Respiratory:  Negative for cough, chest tightness, shortness of breath, wheezing and stridor.    Cardiovascular:  Negative for palpitations and claudication.   Gastrointestinal:  Negative for abdominal pain, constipation, diarrhea, nausea, vomiting and reflux.   Genitourinary:  Negative for dysuria, flank pain, frequency, hematuria and urgency.   Musculoskeletal:  Negative for myalgias.   Neurological:  Negative for dizziness, weakness, light-headedness and headaches.    Psychiatric/Behavioral:  Negative for suicidal ideas.        Medical / Social / Family History     Past Medical History:   Diagnosis Date    Aseptic necrosis of head of humerus 09/27/2013    Cyst of epididymis 02/18/2021    Degeneration of lumbar intervertebral disc 03/24/2013    Depression     Encounter for examination for driving license 08/06/2018    GERD (gastroesophageal reflux disease)     Hypertension     Hypokalemia     Lateral epicondylitis, left elbow 06/01/2020    Myocardial infarction 2020    pt reports    Non-diabetic hypoglycemia 01/10/2012    Presence of right artificial hip joint 02/04/2016    Prolapsed cervical intervertebral disc 05/09/2013    without myelopathy       Past Surgical History:   Procedure Laterality Date    ARTHROSCOPY, HIP Left 9/20/2021    Procedure: ARTHROSCOPY, HIP, WITH LABRUM REPAIR;  Surgeon: Charles Yeh MD;  Location: [facility];  Service: Orthopedics;  Laterality: Left;    ARTHROSCOPY, HIP Left 9/20/2021    Procedure: ARTHROSCOPY, HIP, WITH FEMOROPLASTY;  Surgeon: Charles Yeh MD;  Location: [facility] OR;  Service: Orthopedics;  Laterality: Left;    ARTHROSCOPY, HIP Left 9/20/2021    Procedure: ARTHROSCOPY, HIP, WITH ACETABULOPLASTY, WITH REPAIR OF LABRUM IF INDICATED;  Surgeon: Charles Yeh MD;  Location: [facility] OR;  Service: Orthopedics;  Laterality: Left;    BIOPSY, PROSTATE, USING PROSTATE MAPPING N/A 10/5/2023    Procedure: BIOPSY, PROSTATE, USING PROSTATE MAPPING;  Surgeon: Kristofer Puga MD;  Location: [facility] OR;  Service: Urology;  Laterality: N/A;    CARPAL TUNNEL RELEASE      DIAGNOSTIC LAPAROSCOPY N/A 3/15/2021    Procedure: LAPAROSCOPY, DIAGNOSTIC;  Surgeon: Jay Nolen MD;  Location: [facility] OR;  Service: General;  Laterality: N/A;  1052 FAMILY INFORMED OF SURGERY START  1130 DR NOLEN TALKED TO PATIENT FAMILY    HIP SURGERY      inguinal hernia reparir  03/05/2021    Dr. Robert Nolen    ROBOT-ASSISTED LAPAROSCOPIC REPAIR OF  INGUINAL HERNIA USING DA JALEN XI Bilateral 3/15/2021    Procedure: XI ROBOTIC INGUINAL HERNIA REPAIRS WITH MESH;  Surgeon: Jay Mclain MD;  Location: [facility];  Service: General;  Laterality: Bilateral;    SHOULDER SURGERY      VARICOSE VEIN SURGERY             Medications and Allergies     Medications  Outpatient Medications Marked as Taking for the 4/22/24 encounter (Office Visit) with Adebayo Cox FNP   Medication Sig Dispense Refill    ascorbic acid, vitamin C, (VITAMIN C) 100 MG tablet Take 100 mg by mouth once daily.      azelastine (ASTELIN) 137 mcg (0.1 %) nasal spray 2 sprays (274 mcg total) by Nasal route 2 (two) times daily. 30 mL 0    CARTIA  mg 24 hr capsule Take 240 mg by mouth once daily.      carvediloL (COREG) 12.5 MG tablet Take 12.5 mg by mouth 2 (two) times daily.      cetirizine (ZYRTEC) 10 MG tablet Take 1 tablet (10 mg total) by mouth once daily. 30 tablet 5    colchicine (COLCRYS) 0.6 mg tablet Take 1 tablet (0.6 mg total) by mouth as needed (gout flare up). 30 tablet 0    esomeprazole (NEXIUM) 20 mg GrPS Take 20 mg by mouth every other day. 30 each 3    fluticasone propionate (FLONASE) 50 mcg/actuation nasal spray 1 spray (50 mcg total) by Each Nostril route once daily. 16 g 0    gabapentin (NEURONTIN) 100 MG capsule Take 1 capsule (100 mg total) by mouth 3 (three) times daily. 90 capsule 1    ipratropium (ATROVENT) 42 mcg (0.06 %) nasal spray 2 sprays by Each Nostril route 4 (four) times daily. 15 mL 5    ketorolac (TORADOL) 10 mg tablet Take 10 mg by mouth 4 (four) times daily.      MAG 64 64 mg TbEC Take 64 mg by mouth 2 (two) times daily.      methocarbamoL (ROBAXIN) 750 MG Tab Take 750 mg by mouth every 8 (eight) hours as needed (pain).      neomycin-polymyxin-dexamethasone (DEXACINE) 3.5 mg/g-10,000 unit/g-0.1 % Oint SMARTSIG:Sparingly In Eye(s) Every Night      NIFEdipine (ADALAT CC) 30 MG TbSR Take 30 mg by mouth 2 (two) times a day.      potassium chloride SA  (K-DUR,KLOR-CON) 20 MEQ tablet Take 20 mEq by mouth 2 (two) times daily.      spironolactone (ALDACTONE) 25 MG tablet Take 25 mg by mouth once daily.      tadalafiL (CIALIS) 20 MG Tab Take 20 mg by mouth daily as needed.      XIIDRA 5 % Dpet Instill 1 drop into both eyes twice a day as directed         Allergies  Review of patient's allergies indicates:   Allergen Reactions    Ace inhibitors Swelling    Lisinopril Anaphylaxis    Codeine Nausea And Vomiting    Mobic [meloxicam] Itching       Physical Examination     Vitals:    04/22/24 1531   BP: 135/83   Pulse: 63   Resp: 19   Temp: 98.2 °F (36.8 °C)     Physical Exam  Vitals and nursing note reviewed.   Constitutional:       General: He is awake.      Appearance: Normal appearance.   HENT:      Head: Normocephalic.      Right Ear: Tympanic membrane, ear canal and external ear normal.      Left Ear: Tympanic membrane, ear canal and external ear normal.      Nose: Congestion present.      Right Sinus: Maxillary sinus tenderness present.      Left Sinus: Maxillary sinus tenderness present.      Mouth/Throat:      Lips: Pink.      Mouth: Mucous membranes are moist.      Pharynx: Oropharynx is clear. Uvula midline. Posterior oropharyngeal erythema present. No oropharyngeal exudate.   Cardiovascular:      Rate and Rhythm: Normal rate and regular rhythm.      Heart sounds: Normal heart sounds, S1 normal and S2 normal.   Pulmonary:      Effort: Pulmonary effort is normal. No respiratory distress.      Breath sounds: Normal breath sounds. No decreased breath sounds, wheezing, rhonchi or rales.   Abdominal:      General: Bowel sounds are normal.      Palpations: Abdomen is soft.      Tenderness: There is no abdominal tenderness.   Musculoskeletal:      Cervical back: Normal range of motion.   Skin:     General: Skin is warm.      Capillary Refill: Capillary refill takes less than 2 seconds.   Neurological:      Mental Status: He is alert and oriented to person, place, and  time.   Psychiatric:         Thought Content: Thought content does not include homicidal or suicidal ideation. Thought content does not include homicidal or suicidal plan.               Procedures   Assessment and Plan (including Health Maintenance)   :    Plan:     Problem List Items Addressed This Visit          ENT    Acute maxillary sinusitis    Current Assessment & Plan     Rocephin IM and Decadron IM today. Augmentin RX. Medication instructions and education given with understanding voiced. Rest, increase fluids. OTC antihistamines, decongestants, Ibuprofen, Tylenol as needed. RTC with worsening, new or persistent symptoms.              Orthopedic    Pain of left hip - Primary    Current Assessment & Plan     Diclofenac RX today Medication education and instructions given with understanding voiced.      Patient was instructed to rest, take medications as directed, alternate ice/moist heat to area, and monitor for worsening symptoms that require immediate evaluation. Patient was instructed to follow up if symptoms persist past current treatment plan to discuss further options, such as physical therapy, referral to ortho or other diagnostic imaging. Medication side effects/risk/benefits/directions on taking medications were reviewed with patient. Patient verbalized understanding of treatment plan and denies any questions.           Relevant Medications    diclofenac (VOLTAREN) 75 MG EC tablet       Health Maintenance Topics with due status: Not Due       Topic Last Completion Date    TETANUS VACCINE 04/30/2018    Colorectal Cancer Screening 06/24/2021    Lipid Panel 12/11/2023       Future Appointments   Date Time Provider Department Center   11/21/2024  9:00 AM [facility] NURSE JUDY VALADEZJUDITH James        Health Maintenance Due   Topic Date Due    HIV Screening  Never done    Hemoglobin A1c (Diabetic Prevention Screening)  Never done    Shingles Vaccine (2 of 2) 07/28/2022    COVID-19 Vaccine (6 - 2023-24  season) 11/20/2023        Follow up if symptoms worsen or fail to improve.     Signature:  MARLA CONRAD DHAVAL  [facility]  [address] [address] [place]  [address]    Date of encounter: 4/22/24

## 2024-05-15 NOTE — ASSESSMENT & PLAN NOTE
Rocephin IM and Decadron IM today. Augmentin RX. Medication instructions and education given with understanding voiced. Rest, increase fluids. OTC antihistamines, decongestants, Ibuprofen, Tylenol as needed. RTC with worsening, new or persistent symptoms.

## 2024-05-15 NOTE — ASSESSMENT & PLAN NOTE
Diclofenac RX today Medication education and instructions given with understanding voiced.      Patient was instructed to rest, take medications as directed, alternate ice/moist heat to area, and monitor for worsening symptoms that require immediate evaluation. Patient was instructed to follow up if symptoms persist past current treatment plan to discuss further options, such as physical therapy, referral to ortho or other diagnostic imaging. Medication side effects/risk/benefits/directions on taking medications were reviewed with patient. Patient verbalized understanding of treatment plan and denies any questions.

## 2024-05-16 ENCOUNTER — CLINICAL SUPPORT (OUTPATIENT)
Dept: PRIMARY CARE CLINIC | Facility: CLINIC | Age: 54
End: 2024-05-16

## 2024-05-16 DIAGNOSIS — Z02.4 ENCOUNTER FOR DEPARTMENT OF TRANSPORTATION (DOT) EXAMINATION FOR DRIVING LICENSE RENEWAL: Primary | ICD-10-CM

## 2024-05-16 PROCEDURE — 99499 UNLISTED E&M SERVICE: CPT | Mod: ,,, | Performed by: NURSE PRACTITIONER

## 2024-05-20 PROBLEM — J01.00 ACUTE MAXILLARY SINUSITIS: Status: RESOLVED | Noted: 2024-02-14 | Resolved: 2024-05-20

## 2024-06-09 ENCOUNTER — HOSPITAL ENCOUNTER (EMERGENCY)
Facility: HOSPITAL | Age: 54
Discharge: HOME | End: 2024-06-09
Payer: MEDICARE

## 2024-06-09 VITALS
WEIGHT: 205 LBS | OXYGEN SATURATION: 100 % | SYSTOLIC BLOOD PRESSURE: 140 MMHG | BODY MASS INDEX: 27.17 KG/M2 | TEMPERATURE: 99 F | HEART RATE: 67 BPM | RESPIRATION RATE: 20 BRPM | DIASTOLIC BLOOD PRESSURE: 79 MMHG | HEIGHT: 73 IN

## 2024-06-09 DIAGNOSIS — R42 DIZZINESS: ICD-10-CM

## 2024-06-09 DIAGNOSIS — R11.0 NAUSEA: ICD-10-CM

## 2024-06-09 DIAGNOSIS — I10 HYPERTENSION, UNSPECIFIED TYPE: Primary | ICD-10-CM

## 2024-06-09 DIAGNOSIS — R07.9 CHEST PAIN: ICD-10-CM

## 2024-06-09 LAB
ALBUMIN SERPL BCP-MCNC: 4.1 G/DL (ref 3.5–5)
ALBUMIN/GLOB SERPL: 1.1 {RATIO}
ALP SERPL-CCNC: 102 U/L (ref 45–115)
ALT SERPL W P-5'-P-CCNC: 32 U/L (ref 16–61)
ANION GAP SERPL CALCULATED.3IONS-SCNC: 11 MMOL/L (ref 7–16)
APTT PPP: 22.5 SECONDS (ref 25.2–37.3)
AST SERPL W P-5'-P-CCNC: 31 U/L (ref 15–37)
BASOPHILS # BLD AUTO: 0.05 K/UL (ref 0–0.2)
BASOPHILS NFR BLD AUTO: 1 % (ref 0–1)
BILIRUB SERPL-MCNC: 1.2 MG/DL (ref ?–1.2)
BILIRUB UR QL STRIP: NEGATIVE
BUN SERPL-MCNC: 15 MG/DL (ref 7–18)
BUN/CREAT SERPL: 14 (ref 6–20)
CALCIUM SERPL-MCNC: 9.1 MG/DL (ref 8.5–10.1)
CHLORIDE SERPL-SCNC: 111 MMOL/L (ref 98–107)
CLARITY UR: CLEAR
CO2 SERPL-SCNC: 23 MMOL/L (ref 21–32)
COLOR UR: COLORLESS
CREAT SERPL-MCNC: 1.11 MG/DL (ref 0.7–1.3)
DIFFERENTIAL METHOD BLD: ABNORMAL
EGFR (NO RACE VARIABLE) (RUSH/TITUS): 79 ML/MIN/1.73M2
EOSINOPHIL # BLD AUTO: 0.16 K/UL (ref 0–0.5)
EOSINOPHIL NFR BLD AUTO: 3.1 % (ref 1–4)
ERYTHROCYTE [DISTWIDTH] IN BLOOD BY AUTOMATED COUNT: 13.3 % (ref 11.5–14.5)
GLOBULIN SER-MCNC: 3.7 G/DL (ref 2–4)
GLUCOSE SERPL-MCNC: 97 MG/DL (ref 74–106)
GLUCOSE UR STRIP-MCNC: NORMAL MG/DL
HCT VFR BLD AUTO: 43.8 % (ref 40–54)
HGB BLD-MCNC: 15 G/DL (ref 13.5–18)
IMM GRANULOCYTES # BLD AUTO: 0.01 K/UL (ref 0–0.04)
IMM GRANULOCYTES NFR BLD: 0.2 % (ref 0–0.4)
INR BLD: 1.08
KETONES UR STRIP-SCNC: NEGATIVE MG/DL
LEUKOCYTE ESTERASE UR QL STRIP: NEGATIVE
LYMPHOCYTES # BLD AUTO: 1.96 K/UL (ref 1–4.8)
LYMPHOCYTES NFR BLD AUTO: 38.6 % (ref 27–41)
MAGNESIUM SERPL-MCNC: 1.8 MG/DL (ref 1.7–2.3)
MCH RBC QN AUTO: 29.9 PG (ref 27–31)
MCHC RBC AUTO-ENTMCNC: 34.2 G/DL (ref 32–36)
MCV RBC AUTO: 87.3 FL (ref 80–96)
MONOCYTES # BLD AUTO: 0.56 K/UL (ref 0–0.8)
MONOCYTES NFR BLD AUTO: 11 % (ref 2–6)
MPC BLD CALC-MCNC: 9.8 FL (ref 9.4–12.4)
NEUTROPHILS # BLD AUTO: 2.34 K/UL (ref 1.8–7.7)
NEUTROPHILS NFR BLD AUTO: 46.1 % (ref 53–65)
NITRITE UR QL STRIP: NEGATIVE
NRBC # BLD AUTO: 0 X10E3/UL
NRBC, AUTO (.00): 0 %
NT-PROBNP SERPL-MCNC: 8 PG/ML (ref 1–125)
PH UR STRIP: 5.5 PH UNITS
PLATELET # BLD AUTO: 285 K/UL (ref 150–400)
PLATELET MORPHOLOGY: ABNORMAL
POTASSIUM SERPL-SCNC: 4.2 MMOL/L (ref 3.5–5.1)
PROT SERPL-MCNC: 7.8 G/DL (ref 6.4–8.2)
PROT UR QL STRIP: NEGATIVE
PROTHROMBIN TIME: 13.9 SECONDS (ref 11.7–14.7)
RBC # BLD AUTO: 5.02 M/UL (ref 4.6–6.2)
RBC # UR STRIP: NEGATIVE /UL
RBC MORPH BLD: NORMAL
SODIUM SERPL-SCNC: 141 MMOL/L (ref 136–145)
SP GR UR STRIP: 1.01
TROPONIN I SERPL DL<=0.01 NG/ML-MCNC: <4 PG/ML
TROPONIN I SERPL DL<=0.01 NG/ML-MCNC: <4 PG/ML
UROBILINOGEN UR STRIP-ACNC: NORMAL MG/DL
WBC # BLD AUTO: 5.08 K/UL (ref 4.5–11)

## 2024-06-09 PROCEDURE — 81003 URINALYSIS AUTO W/O SCOPE: CPT | Performed by: NURSE PRACTITIONER

## 2024-06-09 PROCEDURE — 99284 EMERGENCY DEPT VISIT MOD MDM: CPT | Mod: 25

## 2024-06-09 PROCEDURE — 80053 COMPREHEN METABOLIC PANEL: CPT | Performed by: NURSE PRACTITIONER

## 2024-06-09 PROCEDURE — 85610 PROTHROMBIN TIME: CPT | Performed by: NURSE PRACTITIONER

## 2024-06-09 PROCEDURE — 25000003 PHARM REV CODE 250: Performed by: NURSE PRACTITIONER

## 2024-06-09 PROCEDURE — 85730 THROMBOPLASTIN TIME PARTIAL: CPT | Performed by: NURSE PRACTITIONER

## 2024-06-09 PROCEDURE — 85025 COMPLETE CBC W/AUTO DIFF WBC: CPT | Performed by: NURSE PRACTITIONER

## 2024-06-09 PROCEDURE — 84484 ASSAY OF TROPONIN QUANT: CPT | Performed by: NURSE PRACTITIONER

## 2024-06-09 PROCEDURE — 83880 ASSAY OF NATRIURETIC PEPTIDE: CPT | Performed by: NURSE PRACTITIONER

## 2024-06-09 PROCEDURE — 83735 ASSAY OF MAGNESIUM: CPT | Performed by: NURSE PRACTITIONER

## 2024-06-09 PROCEDURE — 93005 ELECTROCARDIOGRAM TRACING: CPT

## 2024-06-09 RX ORDER — ONDANSETRON 4 MG/1
4 TABLET, ORALLY DISINTEGRATING ORAL
Status: COMPLETED | OUTPATIENT
Start: 2024-06-09 | End: 2024-06-09

## 2024-06-09 RX ORDER — ONDANSETRON 4 MG/1
4 TABLET, ORALLY DISINTEGRATING ORAL EVERY 6 HOURS PRN
Qty: 15 TABLET | Refills: 0 | Status: SHIPPED | OUTPATIENT
Start: 2024-06-09

## 2024-06-09 RX ADMIN — ONDANSETRON 4 MG: 4 TABLET, ORALLY DISINTEGRATING ORAL at 04:06

## 2024-06-09 NOTE — ED PROVIDER NOTES
Encounter Date: 6/9/2024       History     Chief Complaint   Patient presents with    Chest Pain    Dizziness    Nausea     Patient presents to Er with complaint of shortness of breath, dizziness, and elevated blood pressure. Patient reports his symptoms started acutely while at [employer] this morning.  Patient reports his blood pressure being 150/110.  He denies chest pain.  Reports symptoms lasted around 30 minutes, and have improved upon arrival to the ER.  He denies vomiting, but reports nausea while symptoms were present.Patient reports history of HTN, GERd, and MI( 3years ago/ no stent placement).  He is followed by Dr Gaviria as [facility].     The history is provided by the patient and the spouse. No  was used.     Review of patient's allergies indicates:   Allergen Reactions    Ace inhibitors Swelling    Lisinopril Anaphylaxis    Codeine Nausea And Vomiting    Mobic [meloxicam] Itching     Past Medical History:   Diagnosis Date    Aseptic necrosis of head of humerus 09/27/2013    Cyst of epididymis 02/18/2021    Degeneration of lumbar intervertebral disc 03/24/2013    Depression     Encounter for examination for driving license 08/06/2018    GERD (gastroesophageal reflux disease)     Hypertension     Hypokalemia     Lateral epicondylitis, left elbow 06/01/2020    Myocardial infarction 2020    pt reports    Non-diabetic hypoglycemia 01/10/2012    Presence of right artificial hip joint 02/04/2016    Prolapsed cervical intervertebral disc 05/09/2013    without myelopathy     Past Surgical History:   Procedure Laterality Date    ARTHROSCOPY, HIP Left 9/20/2021    Procedure: ARTHROSCOPY, HIP, WITH LABRUM REPAIR;  Surgeon: Charles Yeh MD;  Location: [facility];  Service: Orthopedics;  Laterality: Left;    ARTHROSCOPY, HIP Left 9/20/2021    Procedure: ARTHROSCOPY, HIP, WITH FEMOROPLASTY;  Surgeon: Charles Yeh MD;  Location: [facility] OR;  Service: Orthopedics;  Laterality: Left;     ARTHROSCOPY, HIP Left 9/20/2021    Procedure: ARTHROSCOPY, HIP, WITH ACETABULOPLASTY, WITH REPAIR OF LABRUM IF INDICATED;  Surgeon: Charles Yeh MD;  Location: [facility] OR;  Service: Orthopedics;  Laterality: Left;    BIOPSY, PROSTATE, USING PROSTATE MAPPING N/A 10/5/2023    Procedure: BIOPSY, PROSTATE, USING PROSTATE MAPPING;  Surgeon: Kristofer Puga MD;  Location: [facility] OR;  Service: Urology;  Laterality: N/A;    CARPAL TUNNEL RELEASE      DIAGNOSTIC LAPAROSCOPY N/A 3/15/2021    Procedure: LAPAROSCOPY, DIAGNOSTIC;  Surgeon: Jay Nolen MD;  Location: [facility] OR;  Service: General;  Laterality: N/A;  1052 FAMILY INFORMED OF SURGERY START  1130 DR NOLEN TALKED TO PATIENT FAMILY    HIP SURGERY      inguinal hernia reparir  03/05/2021    Dr. Robert Nolen    ROBOT-ASSISTED LAPAROSCOPIC REPAIR OF INGUINAL HERNIA USING DA JALEN XI Bilateral 3/15/2021    Procedure: XI ROBOTIC INGUINAL HERNIA REPAIRS WITH MESH;  Surgeon: Jay Nolen MD;  Location: [facility] OR;  Service: General;  Laterality: Bilateral;    SHOULDER SURGERY      VARICOSE VEIN SURGERY       Family History   Problem Relation Name Age of Onset    Colon cancer Mother      Hypertension Mother      Diabetes Mellitus Father      Heart disease Father      Hypertension Father      Heart attack Sister      Cancer Sister      Heart attack Brother      No Known Problems Daughter      No Known Problems Daughter      Cancer Maternal Grandmother      Heart disease Maternal Grandfather      Heart disease Paternal Grandmother      Diabetes Paternal Grandmother      Hypertension Paternal Grandmother      Cancer Paternal Grandfather       Social History     Tobacco Use    Smoking status: Never     Passive exposure: Never    Smokeless tobacco: Never   Substance Use Topics    Alcohol use: Not Currently    Drug use: Not Currently     Review of Systems   Constitutional:  Positive for activity change, appetite change and fatigue.        Elevated blood  pressure   Cardiovascular:  Positive for chest pain and palpitations.   Gastrointestinal:  Positive for nausea.   Neurological:  Positive for dizziness.   All other systems reviewed and are negative.      Physical Exam     Initial Vitals [06/09/24 1216]   BP Pulse Resp Temp SpO2   117/89 67 18 98.5 °F (36.9 °C) 98 %      MAP       --         Physical Exam    Nursing note and vitals reviewed.  Constitutional: He appears well-developed and well-nourished.   HENT:   Head: Normocephalic.   Nose: Nose normal.   Mouth/Throat: Oropharynx is clear and moist.   Eyes: Conjunctivae and EOM are normal.   Neck: Neck supple.   Normal range of motion.  Cardiovascular:  Normal rate, normal heart sounds and intact distal pulses.           Pulmonary/Chest: Breath sounds normal.   Abdominal: Abdomen is soft. Bowel sounds are normal.   Musculoskeletal:         General: Normal range of motion.      Cervical back: Normal range of motion and neck supple.     Neurological: He is alert and oriented to person, place, and time. He has normal strength. GCS score is 15. GCS eye subscore is 4. GCS verbal subscore is 5. GCS motor subscore is 6.   Skin: Skin is warm and dry. Capillary refill takes less than 2 seconds.   Psychiatric: He has a normal mood and affect. His behavior is normal. Judgment and thought content normal.         Medical Screening Exam   See Full Note    ED Course   Procedures  Labs Reviewed   COMPREHENSIVE METABOLIC PANEL - Abnormal; Notable for the following components:       Result Value    Chloride 111 (*)     All other components within normal limits   APTT - Abnormal; Notable for the following components:    PTT 22.5 (*)     All other components within normal limits   CBC WITH DIFFERENTIAL - Abnormal; Notable for the following components:    Neutrophils % 46.1 (*)     Monocytes % 11.0 (*)     All other components within normal limits   CBC MORPHOLOGY - Abnormal; Notable for the following components:    Platelet Morphology  Few Large Platelets (*)     All other components within normal limits   TROPONIN I - Normal   NT-PRO NATRIURETIC PEPTIDE - Normal   MAGNESIUM - Normal   PROTIME-INR - Normal   TROPONIN I - Normal   CBC W/ AUTO DIFFERENTIAL    Narrative:     The following orders were created for panel order CBC auto differential.  Procedure                               Abnormality         Status                     ---------                               -----------         ------                     CBC with Differential[[id number]]       Abnormal            Final result                 Please view results for these tests on the individual orders.   URINALYSIS, REFLEX TO URINE CULTURE          Imaging Results              X-Ray Chest AP Portable (Final result)  Result time 06/09/24 12:33:23      Final result by Job Small II, MD (06/09/24 12:33:23)                   Impression:      No evidence of cardiopulmonary disease.      Electronically signed by: Job Small  Date:    06/09/2024  Time:    12:33               Narrative:    EXAMINATION:  XR CHEST AP PORTABLE    CLINICAL HISTORY:  Chest Pain;    COMPARISON:  None available    TECHNIQUE:  XR CHEST AP PORTABLE    FINDINGS:  The heart and mediastinum are normal in size and configuration.  The pulmonary vascularity is normal in caliber.  No lung infiltrates, effusions, pneumothorax or other abnormality is demonstrated.                                       Medications   ondansetron disintegrating tablet 4 mg (4 mg Oral Given 6/9/24 1640)     Medical Decision Making  Patient presents to Er with complaint of shortness of breath, dizziness, and elevated blood pressure. Patient reports his symptoms started acutely while at [employer] this morning.  Patient reports his blood pressure being 150/110.  He denies chest pain.  Reports symptoms lasted around 30 minutes, and have improved upon arrival to the ER.  He denies vomiting, but reports nausea while symptoms were present.Patient  reports history of HTN, GERd, and MI( 3years ago/ no stent placement).  He is followed by Dr Gaviria as [facility].       Amount and/or Complexity of Data Reviewed  External Data Reviewed: notes.  Labs: ordered. Decision-making details documented in ED Course.  Radiology: ordered. Decision-making details documented in ED Course.  ECG/medicine tests: ordered. Decision-making details documented in ED Course.  Discussion of management or test interpretation with external provider(s): Lab work, xrays and EKG discussed with patient in detail.  Patient is reassured.   Zofran 4mg Odt to treat nausea given po    Patient verbalizes improvement of symptoms prior to discharge.      Patient is dc home with dx of chest pain, dizziness and nausea,  He is given rx for zofran.  He is instructed to fu with Dr Gaviria at [facility] this week as scheduled.  He is instructed to keep log of blood pressure 3x daily unitl fu appointment.     Risk  Prescription drug management.                                      Clinical Impression:   Final diagnoses:  [R07.9] Chest pain  [I10] Hypertension, unspecified type (Primary)  [R11.0] Nausea  [R42] Dizziness        ED Disposition Condition    Discharge Stable          ED Prescriptions       Medication Sig Dispense Start Date End Date Auth. Provider    ondansetron (ZOFRAN-ODT) 4 MG TbDL Take 1 tablet (4 mg total) by mouth every 6 (six) hours as needed (nausea). 15 tablet 6/9/2024 -- Sarah Ramirez FNP          Follow-up Information       Follow up With Specialties Details Why Contact Info    Chriss Gaviria MD Internal Medicine Schedule an appointment as soon as possible for a visit in 2 days If symptoms worsen [address] [facility] [address]  [phone]               Sarah Ramirez FNP  06/09/24 3743

## 2024-06-09 NOTE — ED TRIAGE NOTES
Patient arrives to ED after having an episode of chest pain, nausea, and dizziness while at [employer]. Patient states that he had midsternal chest heaviness and felt like he was going to pass out.

## 2024-06-09 NOTE — DISCHARGE INSTRUCTIONS
Continue your current medications.  Keep log of your blood pressure and take with you to your follow up appointment with Dr Gaviria this week.  Return to ER with new or worsening symptoms.

## 2024-06-19 DIAGNOSIS — K21.9 GASTROESOPHAGEAL REFLUX DISEASE WITHOUT ESOPHAGITIS: ICD-10-CM

## 2024-06-19 RX ORDER — AZITHROMYCIN 250 MG/1
TABLET, FILM COATED ORAL
COMMUNITY

## 2024-06-19 RX ORDER — AMOXICILLIN AND CLAVULANATE POTASSIUM 875; 125 MG/1; MG/1
TABLET, FILM COATED ORAL
COMMUNITY

## 2024-06-19 RX ORDER — GABAPENTIN 100 MG/1
1 CAPSULE ORAL NIGHTLY
COMMUNITY

## 2024-06-19 RX ORDER — BACLOFEN 5 MG/1
1 TABLET ORAL 3 TIMES DAILY
COMMUNITY

## 2024-06-19 RX ORDER — MONTELUKAST SODIUM 10 MG/1
10 TABLET ORAL NIGHTLY
COMMUNITY
Start: 2024-06-16

## 2024-06-19 RX ORDER — TRAMADOL HYDROCHLORIDE 50 MG/1
1 TABLET ORAL EVERY 6 HOURS PRN
COMMUNITY

## 2024-06-19 RX ORDER — METHYLPREDNISOLONE 4 MG/1
TABLET ORAL
COMMUNITY

## 2024-06-19 RX ORDER — FLUTICASONE PROPIONATE 50 MCG
1 SPRAY, SUSPENSION (ML) NASAL DAILY
COMMUNITY

## 2024-06-19 RX ORDER — AMITRIPTYLINE HYDROCHLORIDE 50 MG/1
TABLET, FILM COATED ORAL
COMMUNITY

## 2024-06-19 RX ORDER — SULFAMETHOXAZOLE AND TRIMETHOPRIM 800; 160 MG/1; MG/1
TABLET ORAL
COMMUNITY

## 2024-06-19 RX ORDER — CYCLOBENZAPRINE HCL 5 MG
1 TABLET ORAL 3 TIMES DAILY PRN
COMMUNITY

## 2024-06-19 RX ORDER — MELOXICAM 7.5 MG/1
TABLET ORAL
COMMUNITY

## 2024-06-19 NOTE — TELEPHONE ENCOUNTER
Patient was in the ER for hypertension and chest pain recently. He states he has followed up with his cardiologist Dr. Gaviria and his NP.

## 2024-06-20 RX ORDER — ESOMEPRAZOLE MAGNESIUM 20 MG/1
20 GRANULE, DELAYED RELEASE ORAL
Qty: 90 EACH | Refills: 0 | Status: SHIPPED | OUTPATIENT
Start: 2024-06-20

## 2024-06-25 ENCOUNTER — OFFICE VISIT (OUTPATIENT)
Dept: FAMILY MEDICINE | Facility: CLINIC | Age: 54
End: 2024-06-25
Payer: MEDICARE

## 2024-06-25 VITALS
DIASTOLIC BLOOD PRESSURE: 76 MMHG | OXYGEN SATURATION: 98 % | TEMPERATURE: 98 F | WEIGHT: 215.81 LBS | SYSTOLIC BLOOD PRESSURE: 114 MMHG | RESPIRATION RATE: 18 BRPM | HEART RATE: 55 BPM | BODY MASS INDEX: 28.6 KG/M2 | HEIGHT: 73 IN

## 2024-06-25 DIAGNOSIS — J32.4 CHRONIC PANSINUSITIS: Primary | ICD-10-CM

## 2024-06-25 PROCEDURE — 3074F SYST BP LT 130 MM HG: CPT | Mod: ,,, | Performed by: NURSE PRACTITIONER

## 2024-06-25 PROCEDURE — 1159F MED LIST DOCD IN RCRD: CPT | Mod: ,,, | Performed by: NURSE PRACTITIONER

## 2024-06-25 PROCEDURE — 3008F BODY MASS INDEX DOCD: CPT | Mod: ,,, | Performed by: NURSE PRACTITIONER

## 2024-06-25 PROCEDURE — 96372 THER/PROPH/DIAG INJ SC/IM: CPT | Mod: ,,, | Performed by: NURSE PRACTITIONER

## 2024-06-25 PROCEDURE — 1160F RVW MEDS BY RX/DR IN RCRD: CPT | Mod: ,,, | Performed by: NURSE PRACTITIONER

## 2024-06-25 PROCEDURE — 3078F DIAST BP <80 MM HG: CPT | Mod: ,,, | Performed by: NURSE PRACTITIONER

## 2024-06-25 PROCEDURE — 99213 OFFICE O/P EST LOW 20 MIN: CPT | Mod: 25,,, | Performed by: NURSE PRACTITIONER

## 2024-06-25 RX ORDER — AZITHROMYCIN 500 MG/1
500 TABLET, FILM COATED ORAL DAILY
Qty: 5 TABLET | Refills: 0 | Status: SHIPPED | OUTPATIENT
Start: 2024-06-25

## 2024-06-25 RX ORDER — PREDNISONE 20 MG/1
20 TABLET ORAL DAILY
Qty: 5 TABLET | Refills: 0 | Status: SHIPPED | OUTPATIENT
Start: 2024-06-25

## 2024-06-25 RX ORDER — CEFTRIAXONE 1 G/1
1 INJECTION, POWDER, FOR SOLUTION INTRAMUSCULAR; INTRAVENOUS
Status: COMPLETED | OUTPATIENT
Start: 2024-06-25 | End: 2024-06-25

## 2024-06-25 RX ORDER — CETIRIZINE HYDROCHLORIDE, PSEUDOEPHEDRINE HYDROCHLORIDE 5; 120 MG/1; MG/1
1 TABLET, FILM COATED, EXTENDED RELEASE ORAL 2 TIMES DAILY PRN
Qty: 20 TABLET | Refills: 0 | Status: SHIPPED | OUTPATIENT
Start: 2024-06-25 | End: 2024-07-05

## 2024-06-25 RX ORDER — FLUTICASONE PROPIONATE 50 MCG
1 SPRAY, SUSPENSION (ML) NASAL DAILY
Qty: 18.2 ML | Refills: 0 | Status: SHIPPED | OUTPATIENT
Start: 2024-06-25

## 2024-06-25 RX ADMIN — CEFTRIAXONE 1 G: 1 INJECTION, POWDER, FOR SOLUTION INTRAMUSCULAR; INTRAVENOUS at 11:06

## 2024-06-25 NOTE — ASSESSMENT & PLAN NOTE
His sinus issues have been recurrent, seen here 2 months ago for similar complaints, and then seen at Urgent Care per his report for same issues about a month ago. He was given Rocephin/Decadron in this clinic 2 months ago and started on Augmentin with no relief. He states at urgent care they gave him a steroid shot and a decongestant, he improved for a day or 2 but then it came back. We will treat with Rocephin only today IM in clinic. Advised he does not need to continue repetitive steroid injection due to risks. He verbalized understanding. Zithromax 500mg x 5 days, Zyrtec D as needed, and Flonase daily. When these symptoms improve he needs to return to his daily Zyrtec use as preventative. HE verbalized understanding. Follow up if symptoms persist or worsen.

## 2024-06-25 NOTE — PROGRESS NOTES
Brit Obando NP   [place] [id number]  [place]  [address]      PATIENT NAME: Gary Moscoso  : 1970  DATE: 24  MRN: [id number]      Billing Provider: Brit Obando NP  Level of Service: [place] OFFICE/OUTPT VISIT, EST, LEVL III, 20-29 MIN  Patient PCP Information       Provider PCP Type    Brit Obando NP General            Reason for Visit / Chief Complaint: Nasal Congestion (Patient is a 53 year old male presenting with nasal congestion for the past 2-3 weeks.), Ear Fullness (Reports ear fullness to the right ear for the past 2-3 weeks.), Headache (Reports occasional headaches for the past 2-3 weeks.), and Health Maintenance (HIV Screening Never done---declined/Hemoglobin A1c (Diabetic Prevention Screening) Never done---declined/Shingles Vaccine(2 of 2) due on 2022/COVID-19 Vaccine(2023- season) due on 2023 )         History of Present Illness / Problem Focused Workflow      53 year old male presenting with nasal congestion, ear fullness, and headache for the past 2-3 weeks.  Ear Fullness: Reports ear fullness to the right ear for the past 2-3 weeks.  Headache: Reports occasional headaches for the past 2-3 weeks.  States he has been taking Cetirizine but it has not improved symptoms. Denies fever.         Review of Systems     @Review of Systems   Constitutional:  Negative for activity change, appetite change, fatigue and fever.   HENT:  Positive for nasal congestion, ear pain and sinus pressure/congestion. Negative for rhinorrhea and sore throat.    Eyes:  Negative for pain, redness, visual disturbance and eye dryness.   Respiratory:  Negative for cough and shortness of breath.    Cardiovascular:  Negative for chest pain and leg swelling.   Gastrointestinal:  Negative for abdominal distention, abdominal pain, constipation and diarrhea.   Endocrine: Negative for cold intolerance, heat intolerance and polyuria.   Genitourinary:  Negative for bladder  incontinence, dysuria, frequency and urgency.   Musculoskeletal:  Negative for arthralgias, gait problem and myalgias.   Integumentary:  Negative for color change, rash and wound.   Allergic/Immunologic: Negative for environmental allergies and food allergies.   Neurological:  Positive for headaches. Negative for dizziness, weakness and light-headedness.   Psychiatric/Behavioral:  Negative for behavioral problems and sleep disturbance.        Medical / Social / Family History     Past Medical History:   Diagnosis Date    Aseptic necrosis of head of humerus 09/27/2013    Cyst of epididymis 02/18/2021    Degeneration of lumbar intervertebral disc 03/24/2013    Depression     Encounter for examination for driving license 08/06/2018    GERD (gastroesophageal reflux disease)     Hypertension     Hypokalemia     Lateral epicondylitis, left elbow 06/01/2020    Myocardial infarction 2020    pt reports    Non-diabetic hypoglycemia 01/10/2012    Presence of right artificial hip joint 02/04/2016    Prolapsed cervical intervertebral disc 05/09/2013    without myelopathy       Past Surgical History:   Procedure Laterality Date    ARTHROSCOPY, HIP Left 9/20/2021    Procedure: ARTHROSCOPY, HIP, WITH LABRUM REPAIR;  Surgeon: Charles Yeh MD;  Location: [facility];  Service: Orthopedics;  Laterality: Left;    ARTHROSCOPY, HIP Left 9/20/2021    Procedure: ARTHROSCOPY, HIP, WITH FEMOROPLASTY;  Surgeon: Charles Yeh MD;  Location: [facility];  Service: Orthopedics;  Laterality: Left;    ARTHROSCOPY, HIP Left 9/20/2021    Procedure: ARTHROSCOPY, HIP, WITH ACETABULOPLASTY, WITH REPAIR OF LABRUM IF INDICATED;  Surgeon: Charles Yeh MD;  Location: [facility] OR;  Service: Orthopedics;  Laterality: Left;    BIOPSY, PROSTATE, USING PROSTATE MAPPING N/A 10/5/2023    Procedure: BIOPSY, PROSTATE, USING PROSTATE MAPPING;  Surgeon: Kristofer Puga MD;  Location: [facility] OR;  Service: Urology;  Laterality: N/A;    CARPAL  TUNNEL RELEASE      DIAGNOSTIC LAPAROSCOPY N/A 3/15/2021    Procedure: LAPAROSCOPY, DIAGNOSTIC;  Surgeon: Jay Nolen MD;  Location: [facility] OR;  Service: General;  Laterality: N/A;  1052 FAMILY INFORMED OF SURGERY START  1130 DR NOLEN TALKED TO PATIENT FAMILY    HIP SURGERY      inguinal hernia reparir  03/05/2021    Dr. Robert Nolen    ROBOT-ASSISTED LAPAROSCOPIC REPAIR OF INGUINAL HERNIA USING DA JALEN XI Bilateral 3/15/2021    Procedure: XI ROBOTIC INGUINAL HERNIA REPAIRS WITH MESH;  Surgeon: Jay Nolen MD;  Location: [facility] OR;  Service: General;  Laterality: Bilateral;    SHOULDER SURGERY      VARICOSE VEIN SURGERY         Medications and Allergies     Medications  Outpatient Medications Marked as Taking for the 6/25/24 encounter (Office Visit) with Brit Obando NP   Medication Sig Dispense Refill    amitriptyline (ELAVIL) 50 MG tablet       ascorbic acid, vitamin C, (VITAMIN C) 100 MG tablet Take 100 mg by mouth once daily.      baclofen (LIORESAL) 5 mg Tab tablet Take 1 tablet by mouth 3 (three) times daily.      CARTIA  mg 24 hr capsule Take 240 mg by mouth once daily.      carvediloL (COREG) 12.5 MG tablet Take 12.5 mg by mouth 2 (two) times daily.      cetirizine (ZYRTEC) 10 MG tablet Take 1 tablet (10 mg total) by mouth once daily. 30 tablet 5    colchicine (COLCRYS) 0.6 mg tablet Take 1 tablet (0.6 mg total) by mouth as needed (gout flare up). 30 tablet 0    cyclobenzaprine (FLEXERIL) 5 MG tablet Take 1 tablet by mouth 3 times daily as needed.      esomeprazole (NEXIUM) 20 mg GrPS Take 20 mg by mouth before breakfast. 90 each 0    gabapentin (NEURONTIN) 100 MG capsule Take 1 capsule by mouth every evening.      ipratropium (ATROVENT) 42 mcg (0.06 %) nasal spray 2 sprays by Each Nostril route 4 (four) times daily. 15 mL 5    ketorolac (TORADOL) 10 mg tablet Take 10 mg by mouth 4 (four) times daily.      MAG 64 64 mg TbEC Take 64 mg by mouth 2 (two) times daily.      meloxicam  (MOBIC) 7.5 MG tablet TAKE 1 TO 2 TABLETS BY MOUTH ONCE DAILY      methocarbamoL (ROBAXIN) 750 MG Tab Take 750 mg by mouth every 8 (eight) hours as needed (pain).      montelukast (SINGULAIR) 10 mg tablet Take 10 mg by mouth every evening.      NIFEdipine (ADALAT CC) 30 MG TbSR Take 30 mg by mouth 2 (two) times a day.      ondansetron (ZOFRAN-ODT) 4 MG TbDL Take 1 tablet (4 mg total) by mouth every 6 (six) hours as needed (nausea). 15 tablet 0    potassium chloride SA (K-DUR,KLOR-CON) 20 MEQ tablet Take 20 mEq by mouth 2 (two) times daily.      spironolactone (ALDACTONE) 25 MG tablet Take 25 mg by mouth once daily.      tadalafiL (CIALIS) 20 MG Tab Take 20 mg by mouth daily as needed.      traMADoL (ULTRAM) 50 mg tablet Take 1 tablet by mouth every 6 (six) hours as needed.      XIIDRA 5 % Dpet Instill 1 drop into both eyes twice a day as directed       Current Facility-Administered Medications for the 6/25/24 encounter (Office Visit) with Brit Obando NP   Medication Dose Route Frequency Provider Last Rate Last Admin    cefTRIAXone injection 1 g  1 g Intramuscular 1 time in Clinic/HOD Brit Obando NP           Allergies  Review of patient's allergies indicates:   Allergen Reactions    Ace inhibitors Swelling    Lisinopril Anaphylaxis    Codeine Nausea And Vomiting    Mobic [meloxicam] Itching       Physical Examination     Vitals:    06/25/24 1035   BP: 114/76   Pulse: (!) 55   Resp: 18   Temp: 97.7 °F (36.5 °C)     Physical Exam  Vitals and nursing note reviewed.   Constitutional:       Appearance: Normal appearance.   HENT:      Head: Normocephalic.      Right Ear: A middle ear effusion is present.      Left Ear: A middle ear effusion is present.      Nose: Congestion and rhinorrhea present. Rhinorrhea is purulent.      Right Turbinates: Pale.      Left Turbinates: Pale.      Right Sinus: Maxillary sinus tenderness and frontal sinus tenderness present.      Left Sinus: Maxillary sinus tenderness and  frontal sinus tenderness present.      Mouth/Throat:      Lips: Pink.      Mouth: Mucous membranes are moist.      Pharynx: Oropharyngeal exudate (clear post nasal drainage.) and posterior oropharyngeal erythema present.   Eyes:      Conjunctiva/sclera: Conjunctivae normal.   Cardiovascular:      Rate and Rhythm: Normal rate and regular rhythm.      Pulses: Normal pulses.      Heart sounds: Normal heart sounds.   Pulmonary:      Effort: Pulmonary effort is normal.      Breath sounds: Normal breath sounds. No wheezing or rhonchi.   Abdominal:      General: Abdomen is flat. Bowel sounds are normal. There is no distension.      Palpations: Abdomen is soft.      Tenderness: There is no abdominal tenderness.   Musculoskeletal:         General: Normal range of motion.      Cervical back: Normal range of motion.   Skin:     General: Skin is warm and dry.      Capillary Refill: Capillary refill takes less than 2 seconds.   Neurological:      General: No focal deficit present.      Mental Status: He is alert and oriented to person, place, and time. Mental status is at baseline.   Psychiatric:         Mood and Affect: Mood normal.         Behavior: Behavior normal.               Lab Results   Component Value Date    WBC 5.08 06/09/2024    HGB 15.0 06/09/2024    HCT 43.8 06/09/2024    MCV 87.3 06/09/2024     06/09/2024        CMP  Sodium   Date Value Ref Range Status   06/09/2024 141 136 - 145 mmol/L Final     Potassium   Date Value Ref Range Status   06/09/2024 4.2 3.5 - 5.1 mmol/L Final     Chloride   Date Value Ref Range Status   06/09/2024 111 (H) 98 - 107 mmol/L Final     CO2   Date Value Ref Range Status   06/09/2024 23 21 - 32 mmol/L Final     Glucose   Date Value Ref Range Status   06/09/2024 97 74 - 106 mg/dL Final     BUN   Date Value Ref Range Status   06/09/2024 15 7 - 18 mg/dL Final     Creatinine   Date Value Ref Range Status   06/09/2024 1.11 0.70 - 1.30 mg/dL Final     Calcium   Date Value Ref Range  Status   06/09/2024 9.1 8.5 - 10.1 mg/dL Final     Total Protein   Date Value Ref Range Status   06/09/2024 7.8 6.4 - 8.2 g/dL Final     Albumin   Date Value Ref Range Status   06/09/2024 4.1 3.5 - 5.0 g/dL Final     Bilirubin, Total   Date Value Ref Range Status   06/09/2024 1.2 >0.0 - 1.2 mg/dL Final     Alk Phos   Date Value Ref Range Status   06/09/2024 102 45 - 115 U/L Final     AST   Date Value Ref Range Status   06/09/2024 31 15 - 37 U/L Final     ALT   Date Value Ref Range Status   06/09/2024 32 16 - 61 U/L Final     Anion Gap   Date Value Ref Range Status   06/09/2024 11 7 - 16 mmol/L Final     eGFR   Date Value Ref Range Status   06/09/2024 79 >=60 mL/min/1.73m2 Final     Procedures   Assessment and Plan (including Health Maintenance)   :    Plan:     Problem List Items Addressed This Visit          ENT    Chronic pansinusitis - Primary    Current Assessment & Plan     His sinus issues have been recurrent, seen here 2 months ago for similar complaints, and then seen at Urgent Care per his report for same issues about a month ago. He was given Rocephin/Decadron in this clinic 2 months ago and started on Augmentin with no relief. He states at urgent care they gave him a steroid shot and a decongestant, he improved for a day or 2 but then it came back. We will treat with Rocephin only today IM in clinic. Advised he does not need to continue repetitive steroid injection due to risks. He verbalized understanding. Zithromax 500mg x 5 days, Zyrtec D as needed, and Flonase daily. When these symptoms improve he needs to return to his daily Zyrtec use as preventative. HE verbalized understanding. Follow up if symptoms persist or worsen.          Relevant Medications    azithromycin (ZITHROMAX) 500 MG tablet    predniSONE (DELTASONE) 20 MG tablet    cetirizine-pseudoephedrine 5-120 mg Tb12    fluticasone propionate (FLONASE) 50 mcg/actuation nasal spray    cefTRIAXone injection 1 g (Start on 6/25/2024 11:15 AM)        Health Maintenance Topics with due status: Not Due       Topic Last Completion Date    TETANUS VACCINE 04/30/2018    Colorectal Cancer Screening 06/24/2021    Lipid Panel 12/11/2023       Future Appointments   Date Time Provider Department Center   11/21/2024  9:00 AM AWV NURSE JUDY [facility]        Health Maintenance Due   Topic Date Due    HIV Screening  Never done    Hemoglobin A1c (Diabetic Prevention Screening)  Never done    Shingles Vaccine (2 of 2) 07/28/2022    COVID-19 Vaccine (6 - 2023-24 season) 11/20/2023          Signature:  Brit Obando NP  [facility]  [address] [address]  [address]    Date of encounter: 6/25/24

## 2024-07-02 ENCOUNTER — TELEPHONE (OUTPATIENT)
Dept: FAMILY MEDICINE | Facility: CLINIC | Age: 54
End: 2024-07-02
Payer: MEDICARE

## 2024-07-02 RX ORDER — HYDROGEN PEROXIDE 3 %
20 SOLUTION, NON-ORAL MISCELLANEOUS
COMMUNITY
Start: 2024-06-20

## 2024-07-02 RX ORDER — 5-HYDROXYTRYPTOPHAN (5-HTP) 100 MG
1 CAPSULE ORAL 2 TIMES DAILY
COMMUNITY
Start: 2024-06-25

## 2024-07-02 NOTE — TELEPHONE ENCOUNTER
Patient's insurance will not cover esomeprazole magnesium DR granules. Patient was able to get his medication. Changed to capsule.

## 2024-07-03 ENCOUNTER — OFFICE VISIT (OUTPATIENT)
Dept: FAMILY MEDICINE | Facility: CLINIC | Age: 54
End: 2024-07-03
Payer: MEDICARE

## 2024-07-03 VITALS
WEIGHT: 211.19 LBS | DIASTOLIC BLOOD PRESSURE: 69 MMHG | HEIGHT: 73 IN | SYSTOLIC BLOOD PRESSURE: 122 MMHG | RESPIRATION RATE: 18 BRPM | TEMPERATURE: 98 F | HEART RATE: 75 BPM | BODY MASS INDEX: 27.99 KG/M2 | OXYGEN SATURATION: 96 %

## 2024-07-03 DIAGNOSIS — H66.004 RECURRENT ACUTE SUPPURATIVE OTITIS MEDIA OF RIGHT EAR WITHOUT SPONTANEOUS RUPTURE OF TYMPANIC MEMBRANE: Primary | ICD-10-CM

## 2024-07-03 PROCEDURE — 3008F BODY MASS INDEX DOCD: CPT | Mod: ,,, | Performed by: NURSE PRACTITIONER

## 2024-07-03 PROCEDURE — 96372 THER/PROPH/DIAG INJ SC/IM: CPT | Mod: ,,, | Performed by: NURSE PRACTITIONER

## 2024-07-03 PROCEDURE — 3078F DIAST BP <80 MM HG: CPT | Mod: ,,, | Performed by: NURSE PRACTITIONER

## 2024-07-03 PROCEDURE — 1159F MED LIST DOCD IN RCRD: CPT | Mod: ,,, | Performed by: NURSE PRACTITIONER

## 2024-07-03 PROCEDURE — 1160F RVW MEDS BY RX/DR IN RCRD: CPT | Mod: ,,, | Performed by: NURSE PRACTITIONER

## 2024-07-03 PROCEDURE — 3074F SYST BP LT 130 MM HG: CPT | Mod: ,,, | Performed by: NURSE PRACTITIONER

## 2024-07-03 PROCEDURE — 99213 OFFICE O/P EST LOW 20 MIN: CPT | Mod: 25,,, | Performed by: NURSE PRACTITIONER

## 2024-07-03 RX ORDER — OFLOXACIN 3 MG/ML
5 SOLUTION AURICULAR (OTIC) DAILY
Qty: 10 ML | Refills: 0 | Status: SHIPPED | OUTPATIENT
Start: 2024-07-03

## 2024-07-03 RX ORDER — PENICILLIN V POTASSIUM 500 MG/1
500 TABLET, FILM COATED ORAL EVERY 8 HOURS
Qty: 21 TABLET | Refills: 0 | Status: SHIPPED | OUTPATIENT
Start: 2024-07-03 | End: 2024-07-10

## 2024-07-03 RX ORDER — CEFTRIAXONE 1 G/1
1 INJECTION, POWDER, FOR SOLUTION INTRAMUSCULAR; INTRAVENOUS
Status: COMPLETED | OUTPATIENT
Start: 2024-07-03 | End: 2024-07-03

## 2024-07-03 RX ADMIN — CEFTRIAXONE 1 G: 1 INJECTION, POWDER, FOR SOLUTION INTRAMUSCULAR; INTRAVENOUS at 11:07

## 2024-07-10 PROBLEM — H66.004 RECURRENT ACUTE SUPPURATIVE OTITIS MEDIA OF RIGHT EAR WITHOUT SPONTANEOUS RUPTURE OF TYMPANIC MEMBRANE: Status: ACTIVE | Noted: 2024-07-10

## 2024-07-10 NOTE — PROGRESS NOTES
Brit Obando NP   [place] [id number]  [place]  [address]      PATIENT NAME: Gary Moscoso  : 1970  DATE: 7/3/24  MRN: [id number]      Billing Provider: Brit Obando NP  Level of Service: [place] OFFICE/OUTPT VISIT, EST, LEVL III, 20-29 MIN  Patient PCP Information       Provider PCP Type    Brit Obando NP General            Reason for Visit / Chief Complaint: Otalgia (Gary Moscoso 53 year old black male presents with right ear pain and swelling to right side of his face. Reports his balance is very unsteady and he has nausea. Ear has not been ok since April , but he was seen 2024 and he finished all antibiotics and steriods  Saturday but pain is worse. 4/10 on pain scale. )         History of Present Illness / Problem Focused Workflow      53 year old black male presents with right ear pain and swelling to right side of his face. Reports his balance is very unsteady and he has nausea. Ear has not been ok since April , but he was seen 2024 and he finished all antibiotics and steriods  Saturday but pain is worse. 4/10 on pain scale.             Review of Systems     @Review of Systems   Constitutional:  Negative for activity change, appetite change, fatigue and fever.   HENT:  Positive for ear pain and facial swelling. Negative for nasal congestion, rhinorrhea, sinus pressure/congestion and sore throat.    Eyes:  Negative for pain, redness, visual disturbance and eye dryness.   Respiratory:  Negative for cough and shortness of breath.    Cardiovascular:  Negative for chest pain and leg swelling.   Gastrointestinal:  Negative for abdominal distention, abdominal pain, constipation and diarrhea.   Endocrine: Negative for cold intolerance, heat intolerance and polyuria.   Genitourinary:  Negative for bladder incontinence, dysuria, frequency and urgency.   Musculoskeletal:  Negative for arthralgias, gait problem and myalgias.   Integumentary:  Negative for color  change, rash and wound.   Allergic/Immunologic: Negative for environmental allergies and food allergies.   Neurological:  Positive for dizziness and headaches. Negative for weakness and light-headedness.   Psychiatric/Behavioral:  Negative for behavioral problems and sleep disturbance.        Medical / Social / Family History     Past Medical History:   Diagnosis Date    Aseptic necrosis of head of humerus 09/27/2013    Cyst of epididymis 02/18/2021    Degeneration of lumbar intervertebral disc 03/24/2013    Depression     Encounter for examination for driving license 08/06/2018    GERD (gastroesophageal reflux disease)     Hypertension     Hypokalemia     Lateral epicondylitis, left elbow 06/01/2020    Myocardial infarction 2020    pt reports    Non-diabetic hypoglycemia 01/10/2012    Presence of right artificial hip joint 02/04/2016    Prolapsed cervical intervertebral disc 05/09/2013    without myelopathy       Past Surgical History:   Procedure Laterality Date    ARTHROSCOPY, HIP Left 9/20/2021    Procedure: ARTHROSCOPY, HIP, WITH LABRUM REPAIR;  Surgeon: Charles Yeh MD;  Location: [facility];  Service: Orthopedics;  Laterality: Left;    ARTHROSCOPY, HIP Left 9/20/2021    Procedure: ARTHROSCOPY, HIP, WITH FEMOROPLASTY;  Surgeon: Charles Yeh MD;  Location: [facility];  Service: Orthopedics;  Laterality: Left;    ARTHROSCOPY, HIP Left 9/20/2021    Procedure: ARTHROSCOPY, HIP, WITH ACETABULOPLASTY, WITH REPAIR OF LABRUM IF INDICATED;  Surgeon: Charles Yeh MD;  Location: [facility];  Service: Orthopedics;  Laterality: Left;    BIOPSY, PROSTATE, USING PROSTATE MAPPING N/A 10/5/2023    Procedure: BIOPSY, PROSTATE, USING PROSTATE MAPPING;  Surgeon: Kristofer Puga MD;  Location: [facility] OR;  Service: Urology;  Laterality: N/A;    CARPAL TUNNEL RELEASE      DIAGNOSTIC LAPAROSCOPY N/A 3/15/2021    Procedure: LAPAROSCOPY, DIAGNOSTIC;  Surgeon: Jay Mclain MD;  Location: [facility] OR;   Service: General;  Laterality: N/A;  1052 FAMILY INFORMED OF SURGERY START  1130 DR NOLEN TALKED TO PATIENT FAMILY    HIP SURGERY      inguinal hernia reparir  2021    Dr. Robert Nolen    ROBOT-ASSISTED LAPAROSCOPIC REPAIR OF INGUINAL HERNIA USING DA JALEN XI Bilateral 3/15/2021    Procedure: XI ROBOTIC INGUINAL HERNIA REPAIRS WITH MESH;  Surgeon: Jay Nolen MD;  Location: [facility];  Service: General;  Laterality: Bilateral;    SHOULDER SURGERY      VARICOSE VEIN SURGERY         Medications and Allergies     Medications  Outpatient Medications Marked as Taking for the 7/3/24 encounter (Office Visit) with Brit Obando NP   Medication Sig Dispense Refill    ALLERGY RELIEF D12 5-120 mg per tablet Take 1 tablet by mouth 2 (two) times daily.      amitriptyline (ELAVIL) 50 MG tablet       ascorbic acid, vitamin C, (VITAMIN C) 100 MG tablet Take 100 mg by mouth once daily.      baclofen (LIORESAL) 5 mg Tab tablet Take 1 tablet by mouth 3 (three) times daily.      CARTIA  mg 24 hr capsule Take 240 mg by mouth once daily.      carvediloL (COREG) 12.5 MG tablet Take 12.5 mg by mouth 2 (two) times daily.      cetirizine (ZYRTEC) 10 MG tablet Take 1 tablet (10 mg total) by mouth once daily. 30 tablet 5    [] cetirizine-pseudoephedrine 5-120 mg Tb12 Take 1 tablet by mouth 2 (two) times daily as needed (congestion). 20 tablet 0    colchicine (COLCRYS) 0.6 mg tablet Take 1 tablet (0.6 mg total) by mouth as needed (gout flare up). 30 tablet 0    cyclobenzaprine (FLEXERIL) 5 MG tablet Take 1 tablet by mouth 3 times daily as needed.      esomeprazole (NEXIUM) 20 MG capsule Take 20 mg by mouth before breakfast.      fluticasone propionate (FLONASE) 50 mcg/actuation nasal spray 1 spray (50 mcg total) by Each Nostril route once daily. 18.2 mL 0    gabapentin (NEURONTIN) 100 MG capsule Take 1 capsule by mouth every evening.      ipratropium (ATROVENT) 42 mcg (0.06 %) nasal spray 2 sprays by Each Nostril  route 4 (four) times daily. 15 mL 5    ketorolac (TORADOL) 10 mg tablet Take 10 mg by mouth 4 (four) times daily.      MAG 64 64 mg TbEC Take 64 mg by mouth 2 (two) times daily.      meloxicam (MOBIC) 7.5 MG tablet TAKE 1 TO 2 TABLETS BY MOUTH ONCE DAILY      methocarbamoL (ROBAXIN) 750 MG Tab Take 750 mg by mouth every 8 (eight) hours as needed (pain).      ondansetron (ZOFRAN-ODT) 4 MG TbDL Take 1 tablet (4 mg total) by mouth every 6 (six) hours as needed (nausea). 15 tablet 0    predniSONE (DELTASONE) 20 MG tablet Take 1 tablet (20 mg total) by mouth once daily. 5 tablet 0    spironolactone (ALDACTONE) 25 MG tablet Take 25 mg by mouth once daily.      tadalafiL (CIALIS) 20 MG Tab Take 20 mg by mouth daily as needed.      traMADoL (ULTRAM) 50 mg tablet Take 1 tablet by mouth every 6 (six) hours as needed.      XIIDRA 5 % Dpet Instill 1 drop into both eyes twice a day as directed         Allergies  Review of patient's allergies indicates:   Allergen Reactions    Ace inhibitors Swelling    Lisinopril Anaphylaxis    Codeine Nausea And Vomiting    Mobic [meloxicam] Itching       Physical Examination     Vitals:    07/03/24 1038   BP: 122/69   Pulse: 75   Resp: 18   Temp: 98.4 °F (36.9 °C)     Physical Exam  Vitals and nursing note reviewed.   HENT:      Head: Normocephalic.      Right Ear: Tympanic membrane is injected, erythematous and bulging.      Left Ear: Tympanic membrane normal.      Nose: Nose normal.      Mouth/Throat:      Mouth: Mucous membranes are moist.      Pharynx: Oropharynx is clear. No posterior oropharyngeal erythema.   Eyes:      Conjunctiva/sclera: Conjunctivae normal.   Cardiovascular:      Rate and Rhythm: Normal rate and regular rhythm.      Pulses: Normal pulses.      Heart sounds: Normal heart sounds.   Pulmonary:      Effort: Pulmonary effort is normal.      Breath sounds: Normal breath sounds.   Abdominal:      General: Abdomen is flat. Bowel sounds are normal. There is no distension.       Palpations: Abdomen is soft.   Musculoskeletal:         General: No swelling or tenderness. Normal range of motion.      Cervical back: Normal range of motion.      Right lower leg: No edema.      Left lower leg: No edema.   Skin:     General: Skin is warm and dry.      Capillary Refill: Capillary refill takes less than 2 seconds.   Neurological:      Mental Status: He is alert. Mental status is at baseline.   Psychiatric:         Mood and Affect: Mood normal.         Behavior: Behavior normal.               Lab Results   Component Value Date    WBC 5.08 06/09/2024    HGB 15.0 06/09/2024    HCT 43.8 06/09/2024    MCV 87.3 06/09/2024     06/09/2024        CMP  Sodium   Date Value Ref Range Status   06/09/2024 141 136 - 145 mmol/L Final     Potassium   Date Value Ref Range Status   06/09/2024 4.2 3.5 - 5.1 mmol/L Final     Chloride   Date Value Ref Range Status   06/09/2024 111 (H) 98 - 107 mmol/L Final     CO2   Date Value Ref Range Status   06/09/2024 23 21 - 32 mmol/L Final     Glucose   Date Value Ref Range Status   06/09/2024 97 74 - 106 mg/dL Final     BUN   Date Value Ref Range Status   06/09/2024 15 7 - 18 mg/dL Final     Creatinine   Date Value Ref Range Status   06/09/2024 1.11 0.70 - 1.30 mg/dL Final     Calcium   Date Value Ref Range Status   06/09/2024 9.1 8.5 - 10.1 mg/dL Final     Total Protein   Date Value Ref Range Status   06/09/2024 7.8 6.4 - 8.2 g/dL Final     Albumin   Date Value Ref Range Status   06/09/2024 4.1 3.5 - 5.0 g/dL Final     Bilirubin, Total   Date Value Ref Range Status   06/09/2024 1.2 >0.0 - 1.2 mg/dL Final     Alk Phos   Date Value Ref Range Status   06/09/2024 102 45 - 115 U/L Final     AST   Date Value Ref Range Status   06/09/2024 31 15 - 37 U/L Final     ALT   Date Value Ref Range Status   06/09/2024 32 16 - 61 U/L Final     Anion Gap   Date Value Ref Range Status   06/09/2024 11 7 - 16 mmol/L Final     eGFR   Date Value Ref Range Status   06/09/2024 79 >=60  mL/min/1.73m2 Final     Procedures   Assessment and Plan (including Health Maintenance)   :    Plan:     Problem List Items Addressed This Visit          ENT    Recurrent acute suppurative otitis media of right ear without spontaneous rupture of tympanic membrane - Primary    Current Assessment & Plan     Has been treated in past with Augmentin as well as Zithromax and ear infection has not completely resolved.   Rocephin IM in clinic.   Pen V as ordered.   Ofloxacin drops as ordered.   Follow up if symptoms persist or worsen.           Relevant Medications    penicillin v potassium (VEETID) 500 MG tablet    ofloxacin (FLOXIN) 0.3 % otic solution       Health Maintenance Topics with due status: Not Due       Topic Last Completion Date    TETANUS VACCINE 04/30/2018    Colorectal Cancer Screening 06/24/2021    Influenza Vaccine 09/25/2023    Lipid Panel 12/11/2023       Future Appointments   Date Time Provider Department Center   11/21/2024  9[facility]        Health Maintenance Due   Topic Date Due    HIV Screening  Never done    Hemoglobin A1c (Diabetic Prevention Screening)  Never done    Shingles Vaccine (2 of 2) 07/28/2022    COVID-19 Vaccine (6 - 2023-24 season) 11/20/2023          Signature:  Brit Obando NP  [facility]  [address] [place] [address], [place]  [address]    Date of encounter: 7/3/24

## 2024-07-10 NOTE — ASSESSMENT & PLAN NOTE
Has been treated in past with Augmentin as well as Zithromax and ear infection has not completely resolved.   Rocephin IM in clinic.   Pen V as ordered.   Ofloxacin drops as ordered.   Follow up if symptoms persist or worsen.

## 2024-08-28 RX ORDER — 5-HYDROXYTRYPTOPHAN (5-HTP) 100 MG
1 CAPSULE ORAL 2 TIMES DAILY PRN
Qty: 20 TABLET | Refills: 0 | Status: SHIPPED | OUTPATIENT
Start: 2024-08-28

## 2024-08-28 NOTE — TELEPHONE ENCOUNTER
Patient called requesting refill for allergy relief Zyrtec D. He stated the regular Zyrtec and Singulair have not been working.

## 2024-09-04 ENCOUNTER — OFFICE VISIT (OUTPATIENT)
Dept: FAMILY MEDICINE | Facility: CLINIC | Age: 54
End: 2024-09-04
Payer: MEDICARE

## 2024-09-04 VITALS
HEART RATE: 78 BPM | WEIGHT: 213.19 LBS | BODY MASS INDEX: 28.25 KG/M2 | TEMPERATURE: 98 F | HEIGHT: 73 IN | SYSTOLIC BLOOD PRESSURE: 122 MMHG | DIASTOLIC BLOOD PRESSURE: 78 MMHG | OXYGEN SATURATION: 99 % | RESPIRATION RATE: 20 BRPM

## 2024-09-04 DIAGNOSIS — M72.2 PLANTAR FASCIITIS OF LEFT FOOT: Primary | ICD-10-CM

## 2024-09-04 DIAGNOSIS — J32.9 SINUSITIS, UNSPECIFIED CHRONICITY, UNSPECIFIED LOCATION: ICD-10-CM

## 2024-09-04 PROCEDURE — 3074F SYST BP LT 130 MM HG: CPT | Mod: ,,, | Performed by: FAMILY MEDICINE

## 2024-09-04 PROCEDURE — 1159F MED LIST DOCD IN RCRD: CPT | Mod: ,,, | Performed by: FAMILY MEDICINE

## 2024-09-04 PROCEDURE — 1160F RVW MEDS BY RX/DR IN RCRD: CPT | Mod: ,,, | Performed by: FAMILY MEDICINE

## 2024-09-04 PROCEDURE — 99213 OFFICE O/P EST LOW 20 MIN: CPT | Mod: 25,,, | Performed by: FAMILY MEDICINE

## 2024-09-04 PROCEDURE — 96372 THER/PROPH/DIAG INJ SC/IM: CPT | Mod: ,,, | Performed by: FAMILY MEDICINE

## 2024-09-04 PROCEDURE — 3078F DIAST BP <80 MM HG: CPT | Mod: ,,, | Performed by: FAMILY MEDICINE

## 2024-09-04 PROCEDURE — 3008F BODY MASS INDEX DOCD: CPT | Mod: ,,, | Performed by: FAMILY MEDICINE

## 2024-09-04 RX ORDER — ASPIRIN 81 MG/1
81 TABLET ORAL DAILY
COMMUNITY

## 2024-09-04 RX ORDER — METHYLPREDNISOLONE ACETATE 40 MG/ML
40 INJECTION, SUSPENSION INTRA-ARTICULAR; INTRALESIONAL; INTRAMUSCULAR; SOFT TISSUE ONCE
Status: COMPLETED | OUTPATIENT
Start: 2024-09-04 | End: 2024-09-04

## 2024-09-04 RX ORDER — DICLOFENAC SODIUM 75 MG/1
75 TABLET, DELAYED RELEASE ORAL
COMMUNITY

## 2024-09-04 RX ORDER — MELOXICAM 7.5 MG/1
TABLET ORAL
Qty: 30 TABLET | Refills: 1 | Status: SHIPPED | OUTPATIENT
Start: 2024-09-04

## 2024-09-04 RX ADMIN — METHYLPREDNISOLONE ACETATE 40 MG: 40 INJECTION, SUSPENSION INTRA-ARTICULAR; INTRALESIONAL; INTRAMUSCULAR; SOFT TISSUE at 10:09

## 2024-09-04 NOTE — PROGRESS NOTES
"   Lester Berger DO   [place] [id number]  [place]  [address]      PATIENT NAME: Gary Moscoso  : 1970  DATE: 24  MRN: [id number]      Billing Provider: Lester Berger DO  Level of Service:   Patient PCP Information       Provider PCP Type    Brit Obando NP General            Reason for Visit / Chief Complaint: Foot Pain (Patient is a 53 year old male who presents to the clinic related to left foot/heel pain since , has gotten worse since July.  Patient reports pain to left lateral heel, states, "feels knot".  Patient just recently took a trip, he could hardly walk on foot.  Patient has been using ibuprofen twice daily, using ice, gout medication & CBD cream.  Patient reports he has tried to back off ibuprofen.  Patient reports pain 4/10 this morning, pain is 10/10 with activity.  ), Sinus Problem (Patient request steroid injection related to sinus congestion x 3 months.  ), and Health Maintenance (Patient declines HIV & A1C screening. )         History of Present Illness / Problem Focused Workflow     Foot Pain  Associated symptoms include myalgias. Pertinent negatives include no abdominal pain, arthralgias, chest pain, chills, coughing, diaphoresis, fever, headaches, nausea, numbness, rash, sore throat or vomiting.   Sinus Problem  Pertinent negatives include no chills, coughing, diaphoresis, headaches, shortness of breath or sore throat.       HPI as noted.  Patient denies fevers, chills, palpitations, diaphoresis, dizziness and lightheadedness.  Patient denies any trauma to the foot.    Review of Systems     @Review of Systems   Constitutional:  Negative for chills, diaphoresis and fever.   HENT:  Negative for sore throat.    Eyes:  Negative for visual disturbance.   Respiratory:  Negative for cough and shortness of breath.    Cardiovascular:  Negative for chest pain and palpitations.   Gastrointestinal:  Negative for abdominal pain, diarrhea, nausea and " vomiting.   Genitourinary:  Negative for dysuria and hematuria.   Musculoskeletal:  Positive for myalgias. Negative for arthralgias and leg pain.   Integumentary:  Negative for rash.   Neurological:  Negative for dizziness, light-headedness, numbness and headaches.       Medical / Social / Family History     Past Medical History:   Diagnosis Date    Aseptic necrosis of head of humerus 09/27/2013    BPH (benign prostatic hyperplasia)     Dr. Kristofer Puga    Cyst of epididymis 02/18/2021    Degeneration of lumbar intervertebral disc 03/24/2013    Depression     Encounter for examination for driving license 08/06/2018    GERD (gastroesophageal reflux disease)     Hypertension     Hypokalemia     Lateral epicondylitis, left elbow 06/01/2020    Myocardial infarction 2020    pt reports    Non-diabetic hypoglycemia 01/10/2012    Presence of right artificial hip joint 02/04/2016    Prolapsed cervical intervertebral disc 05/09/2013    without myelopathy       Past Surgical History:   Procedure Laterality Date    ARTHROSCOPY, HIP Left 9/20/2021    Procedure: ARTHROSCOPY, HIP, WITH LABRUM REPAIR;  Surgeon: Charles Yeh MD;  Location: [facility] OR;  Service: Orthopedics;  Laterality: Left;    ARTHROSCOPY, HIP Left 9/20/2021    Procedure: ARTHROSCOPY, HIP, WITH FEMOROPLASTY;  Surgeon: Charles Yeh MD;  Location: [facility] OR;  Service: Orthopedics;  Laterality: Left;    ARTHROSCOPY, HIP Left 9/20/2021    Procedure: ARTHROSCOPY, HIP, WITH ACETABULOPLASTY, WITH REPAIR OF LABRUM IF INDICATED;  Surgeon: Charles Yeh MD;  Location: [facility] OR;  Service: Orthopedics;  Laterality: Left;    BIOPSY, PROSTATE, USING PROSTATE MAPPING N/A 10/5/2023    Procedure: BIOPSY, PROSTATE, USING PROSTATE MAPPING;  Surgeon: Kristofer Puga MD;  Location: [facility] OR;  Service: Urology;  Laterality: N/A;    CARPAL TUNNEL RELEASE      DIAGNOSTIC LAPAROSCOPY N/A 3/15/2021    Procedure: LAPAROSCOPY, DIAGNOSTIC;  Surgeon: Jay  RUDY Nolen MD;  Location: [facility] OR;  Service: General;  Laterality: N/A;  1052 FAMILY INFORMED OF SURGERY START  1130 DR NOLEN TALKED TO PATIENT FAMILY    HIP SURGERY      inguinal hernia reparir  03/05/2021    Dr. Robert Nolen    ROBOT-ASSISTED LAPAROSCOPIC REPAIR OF INGUINAL HERNIA USING DA JALEN XI Bilateral 3/15/2021    Procedure: XI ROBOTIC INGUINAL HERNIA REPAIRS WITH MESH;  Surgeon: Jay Nolen MD;  Location: [facility] OR;  Service: General;  Laterality: Bilateral;    SHOULDER SURGERY      VARICOSE VEIN SURGERY         Medications and Allergies     Medications  Outpatient Medications Marked as Taking for the 9/4/24 encounter (Office Visit) with Lester Berger DO   Medication Sig Dispense Refill    ALLERGY RELIEF D12 5-120 mg per tablet Take 1 tablet by mouth 2 (two) times daily as needed for Allergies. 20 tablet 0    amitriptyline (ELAVIL) 50 MG tablet 50 mg nightly as needed.      ascorbic acid, vitamin C, (VITAMIN C) 100 MG tablet Take 100 mg by mouth once daily.      aspirin (ECOTRIN) 81 MG EC tablet Take 81 mg by mouth once daily.      baclofen (LIORESAL) 5 mg Tab tablet Take 1 tablet by mouth 3 (three) times daily.      CARTIA  mg 24 hr capsule Take 240 mg by mouth once daily.      carvediloL (COREG) 12.5 MG tablet Take 12.5 mg by mouth 2 (two) times daily.      colchicine (COLCRYS) 0.6 mg tablet Take 1 tablet (0.6 mg total) by mouth as needed (gout flare up). 30 tablet 0    cyclobenzaprine (FLEXERIL) 5 MG tablet Take 1 tablet by mouth 3 times daily as needed.      diclofenac (VOLTAREN) 75 MG EC tablet Take 75 mg by mouth as needed.      esomeprazole (NEXIUM) 20 MG capsule Take 20 mg by mouth before breakfast.      fluticasone propionate (FLONASE) 50 mcg/actuation nasal spray 1 spray (50 mcg total) by Each Nostril route once daily. 18.2 mL 0    gabapentin (NEURONTIN) 100 MG capsule Take 1 capsule by mouth every evening.      ipratropium (ATROVENT) 42 mcg (0.06 %) nasal spray 2 sprays by Each  Nostril route 4 (four) times daily. 15 mL 5    ketorolac (TORADOL) 10 mg tablet Take 10 mg by mouth 4 (four) times daily.      MAG 64 64 mg TbEC Take 64 mg by mouth 2 (two) times daily.      methocarbamoL (ROBAXIN) 750 MG Tab Take 750 mg by mouth every 8 (eight) hours as needed (pain).      montelukast (SINGULAIR) 10 mg tablet Take 10 mg by mouth every evening.      NIFEdipine (ADALAT CC) 30 MG TbSR Take 30 mg by mouth 2 (two) times a day.      ofloxacin (FLOXIN) 0.3 % otic solution Place 5 drops into the right ear once daily. (Patient taking differently: Place 5 drops into the right ear as needed.) 10 mL 0    ondansetron (ZOFRAN-ODT) 4 MG TbDL Take 1 tablet (4 mg total) by mouth every 6 (six) hours as needed (nausea). 15 tablet 0    potassium chloride SA (K-DUR,KLOR-CON) 20 MEQ tablet Take 20 mEq by mouth 2 (two) times daily.      predniSONE (DELTASONE) 20 MG tablet Take 1 tablet (20 mg total) by mouth once daily. 5 tablet 0    spironolactone (ALDACTONE) 25 MG tablet Take 25 mg by mouth once daily.      tadalafiL (CIALIS) 20 MG Tab Take 20 mg by mouth daily as needed.      tamsulosin (FLOMAX) 0.4 mg Cap Take 1 capsule by mouth every morning.      traMADoL (ULTRAM) 50 mg tablet Take 1 tablet by mouth every 6 (six) hours as needed.      XIIDRA 5 % Dpet Instill 1 drop into both eyes twice a day as directed      [DISCONTINUED] meloxicam (MOBIC) 7.5 MG tablet TAKE 1 TO 2 TABLETS BY MOUTH ONCE DAILY       Current Facility-Administered Medications for the 9/4/24 encounter (Office Visit) with Lester Berger DO   Medication Dose Route Frequency Provider Last Rate Last Admin    [COMPLETED] methylPREDNISolone acetate injection 40 mg  40 mg Intramuscular Once Lester Berger DO   40 mg at 09/04/24 1038       Allergies  Review of patient's allergies indicates:   Allergen Reactions    Ace inhibitors Swelling    Lisinopril Anaphylaxis    Codeine Nausea And Vomiting    Mobic [meloxicam] Itching       Physical Examination      Vitals:    09/04/24 0901   BP: 122/78   Pulse: 78   Resp: 20   Temp: 98.4 °F (36.9 °C)     Physical Exam  Vitals and nursing note reviewed.   Constitutional:       General: He is not in acute distress.     Appearance: He is not ill-appearing, toxic-appearing or diaphoretic.   HENT:      Head: Normocephalic and atraumatic.      Right Ear: External ear normal.      Left Ear: External ear normal.      Nose: Nose normal.      Mouth/Throat:      Pharynx: No oropharyngeal exudate or posterior oropharyngeal erythema.   Eyes:      General: No scleral icterus.        Right eye: No discharge.         Left eye: No discharge.      Extraocular Movements: Extraocular movements intact.   Neck:      Vascular: No carotid bruit.   Cardiovascular:      Rate and Rhythm: Normal rate and regular rhythm.      Pulses: Normal pulses.      Heart sounds: Normal heart sounds. No murmur heard.     No friction rub. No gallop.   Pulmonary:      Effort: Pulmonary effort is normal. No respiratory distress.      Breath sounds: No stridor. No wheezing, rhonchi or rales.   Chest:      Chest wall: No tenderness.   Abdominal:      Palpations: Abdomen is soft.      Tenderness: There is no abdominal tenderness. There is no guarding.   Musculoskeletal:         General: No swelling.      Right lower leg: No edema.      Left lower leg: No edema.   Feet:      Comments: No erythema, fluctuance, edema or tenderness to palpation noted to the left heel.  Skin:     General: Skin is warm and dry.   Neurological:      Mental Status: He is alert and oriented to person, place, and time.               Lab Results   Component Value Date    WBC 5.08 06/09/2024    HGB 15.0 06/09/2024    HCT 43.8 06/09/2024    MCV 87.3 06/09/2024     06/09/2024        CMP  Sodium   Date Value Ref Range Status   06/09/2024 141 136 - 145 mmol/L Final     Potassium   Date Value Ref Range Status   06/09/2024 4.2 3.5 - 5.1 mmol/L Final     Chloride   Date Value Ref Range Status    06/09/2024 111 (H) 98 - 107 mmol/L Final     CO2   Date Value Ref Range Status   06/09/2024 23 21 - 32 mmol/L Final     Glucose   Date Value Ref Range Status   06/09/2024 97 74 - 106 mg/dL Final     BUN   Date Value Ref Range Status   06/09/2024 15 7 - 18 mg/dL Final     Creatinine   Date Value Ref Range Status   06/09/2024 1.11 0.70 - 1.30 mg/dL Final     Calcium   Date Value Ref Range Status   06/09/2024 9.1 8.5 - 10.1 mg/dL Final     Total Protein   Date Value Ref Range Status   06/09/2024 7.8 6.4 - 8.2 g/dL Final     Albumin   Date Value Ref Range Status   06/09/2024 4.1 3.5 - 5.0 g/dL Final     Bilirubin, Total   Date Value Ref Range Status   06/09/2024 1.2 >0.0 - 1.2 mg/dL Final     Alk Phos   Date Value Ref Range Status   06/09/2024 102 45 - 115 U/L Final     AST   Date Value Ref Range Status   06/09/2024 31 15 - 37 U/L Final     ALT   Date Value Ref Range Status   06/09/2024 32 16 - 61 U/L Final     Anion Gap   Date Value Ref Range Status   06/09/2024 11 7 - 16 mmol/L Final     eGFR   Date Value Ref Range Status   06/09/2024 79 >=60 mL/min/1.73m2 Final     Procedures   Assessment and Plan (including Health Maintenance)   :    Plan:     Problem List Items Addressed This Visit    None  Visit Diagnoses       Plantar fasciitis of left foot    -  Primary    Relevant Medications    meloxicam (MOBIC) 7.5 MG tablet    Other Relevant Orders    X-Ray Foot Complete 3 view Left    Sinusitis, unspecified chronicity, unspecified location        Relevant Medications    methylPREDNISolone acetate injection 40 mg (Completed)        Patient is likely suffering from plantar fasciitis of the foot.  Patient given exercises and recommendations on over-the-counter remedies.  Patient also given Mobic for pain.  Patient is requesting Depo-Medrol injection for sinus congestion.  Steroid injection may help with both heel pain and sinuses.  .  Patient advised to use shoes with good support.  Patient may be referred to podiatry should  symptoms persist or worsen.  Follow up in 1 month or sooner if needed.  Patient's signals understanding and agreement with the plan of care.    Health Maintenance Topics with due status: Not Due       Topic Last Completion Date    TETANUS VACCINE 04/30/2018    Colorectal Cancer Screening 06/24/2021    Lipid Panel 12/11/2023       Future Appointments   Date Time Provider Department Center   11/21/2024  9:00 AM AWV NURSE [facility] [place]        Health Maintenance Due   Topic Date Due    HIV Screening  Never done    Hemoglobin A1c (Diabetic Prevention Screening)  Never done    Shingles Vaccine (2 of 2) 07/28/2022    Influenza Vaccine (1) 09/01/2024    COVID-19 Vaccine (6 - 2023-24 season) 09/01/2024          Signature:  Lester Berger DO  [facility]  [address] [place] [address]  [id number]    Date of encounter: 9/4/24

## 2024-09-04 NOTE — Clinical Note
Patient reports he just received his covid & flu vaccine yesterday at [facility] in [place].  Please obtain records.

## 2024-09-09 ENCOUNTER — PATIENT OUTREACH (OUTPATIENT)
Facility: HOSPITAL | Age: 54
End: 2024-09-09
Payer: MEDICARE

## 2024-09-09 DIAGNOSIS — Z71.89 COMPLEX CARE COORDINATION: ICD-10-CM

## 2024-09-09 NOTE — LETTER
AUTHORIZATION FOR RELEASE OF   CONFIDENTIAL INFORMATION    Dear Chana Gilliam,    We are seeing Gary Moscoso, date of birth 1970, in the clinic at [facility]. Brit Obando NP is the patient's PCP. Gary Moscoso has an outstanding lab/procedure at the time we reviewed his chart. In order to help keep his health information updated, he has authorized us to request the following medical record(s):            ( X )  OUTSIDE IMMUNIZATIONS                       Please fax records to [facility], Peterson, Kristin, NP, [phone]     If you have any questions, please contact Nanda Woodard at [phone].           Patient Name: Gary Moscoso  : 1970  Patient Phone #: [phone]

## 2024-09-09 NOTE — PROGRESS NOTES
Population Health Chart Review & Patient Outreach Details      Further Action Needed If Patient Returns Outreach:      [address] [facility] [place] for recent vaccines TC,Lpn-CCC      Updates Requested / Reviewed:     [x]  Care Everywhere    [x]     []  External Sources ([facility], [facility], DIS, etc.)    [] [facility]   [] [facility]   [] Other:    [x]  Care Team Updated   []  Removed  or Duplicate Orders   [x]  Immunization Reconciliation Completed / Queried    [] [place]   [x] [place]   [] [place]   [] [place]      Health Maintenance Topics Addressed and Outreach Outcomes / Actions Taken:             Breast Cancer Screening []  Mammogram Order Placed    []  Mammogram Screening Scheduled    []  External Records Requested & Care Team Updated if Applicable    []  External Records Uploaded & Care Team Updated if Applicable    []  Pt Declined Scheduling Mammogram    []  Pt Will Schedule with External Provider / Order Routed & Care Team Updated if Applicable              Cervical Cancer Screening []  Pap Smear Scheduled in Primary Care or OBGYN    []  External Records Requested & Care Team Updated if Applicable       []  External Records Uploaded, Care Team Updated, & History Updated if Applicable    []  Patient Declined Scheduling Pap Smear    []  Patient Will Schedule with External Provider & Care Team Updated if Applicable                  Colorectal Cancer Screening []  Colonoscopy Case Request / Referral / Home Test Order Placed    []  External Records Requested & Care Team Updated if Applicable    []  External Records Uploaded, Care Team Updated, & History Updated if Applicable    []  Patient Declined Completing Colon Cancer Screening    []  Patient Will Schedule with External Provider & Care Team Updated if Applicable    []  Fit Kit Mailed (add the SmartPhrase under additional notes)    []  Reminded Patient to Complete Home Test                Diabetic Eye Exam []  Eye Exam Screening Order Placed    []   Eye Camera Scheduled or Optometry/Ophthalmology Referral Placed    []  External Records Requested & Care Team Updated if Applicable    []  External Records Uploaded, Care Team Updated, & History Updated if Applicable    []  Patient Declined Scheduling Eye Exam    []  Patient Will Schedule with External Provider & Care Team Updated if Applicable             Blood Pressure Control []  Primary Care Follow Up Visit Scheduled     []  Remote Blood Pressure Reading Captured    []  Patient Declined Remote Reading or Scheduling Appt - Escalated to PCP    []  Patient Will Call Back or Send Portal Message with Reading                 HbA1c & Other Labs []  Overdue Lab(s) Ordered    []  Overdue Lab(s) Scheduled    []  External Records Uploaded & Care Team Updated if Applicable    []  Primary Care Follow Up Visit Scheduled     []  Reminded Patient to Complete A1c Home Test    []  Patient Declined Scheduling Labs or Will Call Back to Schedule    []  Patient Will Schedule with External Provider / Order Routed, & Care Team Updated if Applicable           Primary Care Appointment []  Primary Care Appt Scheduled    []  Patient Declined Scheduling or Will Call Back to Schedule    []  Pt Established with External Provider, Updated Care Team, & Informed Pt to Notify Payor if Applicable           Medication Adherence /    Statin Use []  Primary Care Appointment Scheduled    []  Patient Reminded to  Prescription    []  Patient Declined, Provider Notified if Needed    []  Sent Provider Message to Review to Evaluate Pt for Statin, Add Exclusion Dx Codes, Document   Exclusion in Problem List, Change Statin Intensity Level to Moderate or High Intensity if Applicable                Osteoporosis Screening []  Dexa Order Placed    []  Dexa Appointment Scheduled    []  External Records Requested & Care Team Updated    []  External Records Uploaded, Care Team Updated, & History Updated if Applicable    []  Patient Declined Scheduling Dexa  or Will Call Back to Schedule    []  Patient Will Schedule with External Provider / Order Routed & Care Team Updated if Applicable       Additional Notes:

## 2024-10-08 ENCOUNTER — PATIENT OUTREACH (OUTPATIENT)
Facility: HOSPITAL | Age: 54
End: 2024-10-08
Payer: MEDICARE

## 2024-10-08 NOTE — PROGRESS NOTES
Population Health Chart Review & Patient Outreach Details      Further Action Needed If Patient Returns Outreach:      10/8/24 upload vaccine to health maintenance TC      Updates Requested / Reviewed:     []  Care Everywhere    []     []  External Sources ([facility], [facility], DIS, etc.)    [] [facility]   [] [facility]   [] Other:    []  Care Team Updated   []  Removed  or Duplicate Orders   []  Immunization Reconciliation Completed / Queried    [] [place]   [] [place]   [] [place]   [] [place]      Health Maintenance Topics Addressed and Outreach Outcomes / Actions Taken:             Breast Cancer Screening []  Mammogram Order Placed    []  Mammogram Screening Scheduled    []  External Records Requested & Care Team Updated if Applicable    []  External Records Uploaded & Care Team Updated if Applicable    []  Pt Declined Scheduling Mammogram    []  Pt Will Schedule with External Provider / Order Routed & Care Team Updated if Applicable              Cervical Cancer Screening []  Pap Smear Scheduled in Primary Care or OBGYN    []  External Records Requested & Care Team Updated if Applicable       []  External Records Uploaded, Care Team Updated, & History Updated if Applicable    []  Patient Declined Scheduling Pap Smear    []  Patient Will Schedule with External Provider & Care Team Updated if Applicable                  Colorectal Cancer Screening []  Colonoscopy Case Request / Referral / Home Test Order Placed    []  External Records Requested & Care Team Updated if Applicable    []  External Records Uploaded, Care Team Updated, & History Updated if Applicable    []  Patient Declined Completing Colon Cancer Screening    []  Patient Will Schedule with External Provider & Care Team Updated if Applicable    []  Fit Kit Mailed (add the SmartPhrase under additional notes)    []  Reminded Patient to Complete Home Test                Diabetic Eye Exam []  Eye Exam Screening Order Placed    []  Eye Camera  Scheduled or Optometry/Ophthalmology Referral Placed    []  External Records Requested & Care Team Updated if Applicable    []  External Records Uploaded, Care Team Updated, & History Updated if Applicable    []  Patient Declined Scheduling Eye Exam    []  Patient Will Schedule with External Provider & Care Team Updated if Applicable             Blood Pressure Control []  Primary Care Follow Up Visit Scheduled     []  Remote Blood Pressure Reading Captured    []  Patient Declined Remote Reading or Scheduling Appt - Escalated to PCP    []  Patient Will Call Back or Send Portal Message with Reading                 HbA1c & Other Labs []  Overdue Lab(s) Ordered    []  Overdue Lab(s) Scheduled    []  External Records Uploaded & Care Team Updated if Applicable    []  Primary Care Follow Up Visit Scheduled     []  Reminded Patient to Complete A1c Home Test    []  Patient Declined Scheduling Labs or Will Call Back to Schedule    []  Patient Will Schedule with External Provider / Order Routed, & Care Team Updated if Applicable           Primary Care Appointment []  Primary Care Appt Scheduled    []  Patient Declined Scheduling or Will Call Back to Schedule    []  Pt Established with External Provider, Updated Care Team, & Informed Pt to Notify Payor if Applicable           Medication Adherence /    Statin Use []  Primary Care Appointment Scheduled    []  Patient Reminded to  Prescription    []  Patient Declined, Provider Notified if Needed    []  Sent Provider Message to Review to Evaluate Pt for Statin, Add Exclusion Dx Codes, Document   Exclusion in Problem List, Change Statin Intensity Level to Moderate or High Intensity if Applicable                Osteoporosis Screening []  Dexa Order Placed    []  Dexa Appointment Scheduled    []  External Records Requested & Care Team Updated    []  External Records Uploaded, Care Team Updated, & History Updated if Applicable    []  Patient Declined Scheduling Dexa or Will Call  Back to Schedule    []  Patient Will Schedule with External Provider / Order Routed & Care Team Updated if Applicable       Additional Notes:

## 2024-11-15 NOTE — PROGRESS NOTES
"  Gary Moscoso presented for a  Medicare AWV and comprehensive Health Risk Assessment today. The following components were reviewed and updated:    Medical history  Family History  Social history  Allergies and Current Medications  Health Risk Assessment  Health Maintenance  Care Team         ** See Completed Assessments for Annual Wellness Visit within the encounter summary.**         The following assessments were completed:  Living Situation  CAGE  Depression Screening  Timed Get Up and Go  Whisper Test  Cognitive Function Screening  Nutrition Screening  ADL Screening  PAQ Screening        Opioid documentationdoes have a current opioid prescription.      Patient accepted further discussion regarding opioid medication use.      Patient is currently taking tramadol narcotic for back pain.        Pain level today is 4/10.       In addition to narcotic pain medications, patient is also using Baclofen, Flexeril for pain control.       Patient is followed by a specialist currently for their pain and will not be referred today.       Patient's opioid risk potential based on ORT-OUD tool:       Kristofer each box that applies   No   Yes     Family history of substance abuse   Alcohol [x] []   Illegal drugs [x] []   Rx drugs [x] []     Personal history of substance abuse   Alcohol [x] []   Illegal drugs [x] []   Rx drugs [x] []     Age between 16-45 years   [x]   []     Patient with ADD, OCD, Bipolar disorder, schizoprenia   [x]   []     Patient with depression   []   [x]                         Scoring total                                            1                     Non-opioid treatment options have been discussed today and added to the patient's after visit summary.                   Vitals:    11/21/24 0855   BP: 118/70   BP Location: Left arm   Patient Position: Sitting   Pulse: 74   Resp: 20   Temp: 98.4 °F (36.9 °C)   TempSrc: Oral   SpO2: 97%   Weight: 97.1 kg (214 lb 1.3 oz)   Height: 6' 1" (1.854 m)     Body mass " index is 28.24 kg/m².  Physical Exam  Vitals and nursing note reviewed.   HENT:      Head: Normocephalic.      Right Ear: Tympanic membrane normal.      Left Ear: Tympanic membrane normal.      Nose: Nose normal.      Mouth/Throat:      Mouth: Mucous membranes are moist.      Pharynx: Oropharynx is clear. No posterior oropharyngeal erythema.   Eyes:      Conjunctiva/sclera: Conjunctivae normal.   Cardiovascular:      Rate and Rhythm: Normal rate and regular rhythm.      Pulses: Normal pulses.      Heart sounds: Normal heart sounds.   Pulmonary:      Effort: Pulmonary effort is normal.      Breath sounds: Normal breath sounds.   Abdominal:      General: Abdomen is flat. Bowel sounds are normal. There is no distension.      Palpations: Abdomen is soft.   Musculoskeletal:         General: No swelling or tenderness.      Cervical back: Normal range of motion.      Lumbar back: Bony tenderness present. Decreased range of motion.      Right lower leg: No edema.      Left lower leg: No edema.   Skin:     General: Skin is warm and dry.      Capillary Refill: Capillary refill takes less than 2 seconds.   Neurological:      Mental Status: He is alert. Mental status is at baseline.   Psychiatric:         Mood and Affect: Mood normal.         Behavior: Behavior normal.               Diagnoses and health risks identified today and associated recommendations/orders:    Problem List Items Addressed This Visit          Psychiatric    Depression, recurrent     Currently not taking any meds for depression. He states he manages his depression by spending time with family or talking with others when he is feeling down. He is not interested in taking meds or counseling at this time.             Cardiac/Vascular    Essential hypertension     Blood pressure well controlled. Continue current medications. Follow up in 3 months or as needed.            Relevant Orders    Lipid Panel (Completed)     Other Visit Diagnoses       Encounter for  initial annual wellness visit (AWV) in Medicare patient    -  Primary    BMI 28.0-28.9,adult                Provided Gary with a 5-10 year written screening schedule and personal prevention plan. Recommendations were developed using the USPSTF age appropriate recommendations. Education, counseling, and referrals were provided as needed. After Visit Summary printed and given to patient which includes a list of additional screenings\tests needed.    Follow up for yearly annual wellness visit    I offered to discuss advanced care planning, including how to pick a person who would make decisions for you if you were unable to make them for yourself, called a health care power of , and what kind of decisions you might make such as use of life sustaining treatments such as ventilators and tube feeding when faced with a life limiting illness recorded on a living will that they will need to know. (How you want to be cared for as you near the end of your natural life)     X Patient is interested in learning more about how to make advanced directives.  I provided them paperwork and offered to discuss this with them.

## 2024-11-15 NOTE — PATIENT INSTRUCTIONS
Counseling and Referral of Other Preventative  (Italic type indicates deductible and co-insurance are waived)    Patient Name: Gary Moscoso  Today's Date: 11/21/2024    Health Maintenance       Date Due Completion Date    HIV Screening Never done ---    Hemoglobin A1c (Diabetic Prevention Screening) Never done ---    Shingles Vaccine (2 of 2) 07/28/2022 6/2/2022    Lipid Panel 12/11/2024 12/11/2023    Colorectal Cancer Screening 06/24/2026 6/24/2021    TETANUS VACCINE 04/30/2028 4/30/2018    RSV Vaccine (Age 60+ and Pregnant patients) (1 - 1-dose 75+ series) 10/05/2045 ---        No orders of the defined types were placed in this encounter.      Counseling and Referral of Other Preventative  (Italic type indicates deductible and co-insurance are waived)    Patient Name: Gary Moscoso  Today's Date: 11/21/2024    Health Maintenance       Date Due Completion Date    HIV Screening Never done ---    Hemoglobin A1c (Diabetic Prevention Screening) Never done ---    Shingles Vaccine (2 of 2) 07/28/2022 6/2/2022    Lipid Panel 12/11/2024 12/11/2023    Colorectal Cancer Screening 06/24/2026 6/24/2021    TETANUS VACCINE 04/30/2028 4/30/2018    RSV Vaccine (Age 60+ and Pregnant patients) (1 - 1-dose 75+ series) 10/05/2045 ---        No orders of the defined types were placed in this encounter.      The following information is provided to all patients.  This information is to help you find resources for any of the problems found today that may be affecting your health:                  Living healthy guide: ms.gov    Understanding Diabetes: www.diabetes.org      Eating healthy: www.cdc.gov/healthyweight      CDC home safety checklist: www.cdc.gov/steadi/patient.html      Agency on Aging: ms.gov    Alcoholics anonymous (AA): www.aa.org      Physical Activity: www.alanna.nih.gov/rs7cgbj      Tobacco use: ms.gov

## 2024-11-21 ENCOUNTER — OFFICE VISIT (OUTPATIENT)
Dept: FAMILY MEDICINE | Facility: CLINIC | Age: 54
End: 2024-11-21
Payer: MEDICARE

## 2024-11-21 VITALS
HEART RATE: 74 BPM | BODY MASS INDEX: 28.37 KG/M2 | RESPIRATION RATE: 20 BRPM | WEIGHT: 214.06 LBS | SYSTOLIC BLOOD PRESSURE: 118 MMHG | HEIGHT: 73 IN | DIASTOLIC BLOOD PRESSURE: 70 MMHG | OXYGEN SATURATION: 97 % | TEMPERATURE: 98 F

## 2024-11-21 DIAGNOSIS — F33.9 DEPRESSION, RECURRENT: ICD-10-CM

## 2024-11-21 DIAGNOSIS — I10 ESSENTIAL HYPERTENSION: ICD-10-CM

## 2024-11-21 DIAGNOSIS — Z00.00 ENCOUNTER FOR INITIAL ANNUAL WELLNESS VISIT (AWV) IN MEDICARE PATIENT: Primary | ICD-10-CM

## 2024-11-21 PROBLEM — J32.4 CHRONIC PANSINUSITIS: Status: RESOLVED | Noted: 2021-11-04 | Resolved: 2024-11-21

## 2024-11-21 PROBLEM — H66.004 RECURRENT ACUTE SUPPURATIVE OTITIS MEDIA OF RIGHT EAR WITHOUT SPONTANEOUS RUPTURE OF TYMPANIC MEMBRANE: Status: RESOLVED | Noted: 2024-07-10 | Resolved: 2024-11-21

## 2024-11-21 PROBLEM — R09.81 NASAL CONGESTION: Status: RESOLVED | Noted: 2024-02-14 | Resolved: 2024-11-21

## 2024-11-21 PROCEDURE — G0438 PPPS, INITIAL VISIT: HCPCS | Mod: ,,, | Performed by: NURSE PRACTITIONER

## 2024-11-21 PROCEDURE — 3074F SYST BP LT 130 MM HG: CPT | Mod: ,,, | Performed by: NURSE PRACTITIONER

## 2024-11-21 PROCEDURE — 3078F DIAST BP <80 MM HG: CPT | Mod: ,,, | Performed by: NURSE PRACTITIONER

## 2024-11-21 RX ORDER — DICLOFENAC SODIUM 75 MG/1
75 TABLET, DELAYED RELEASE ORAL 2 TIMES DAILY
Qty: 60 TABLET | Refills: 5 | Status: SHIPPED | OUTPATIENT
Start: 2024-11-21

## 2024-11-21 RX ORDER — ONDANSETRON 4 MG/1
4 TABLET, ORALLY DISINTEGRATING ORAL EVERY 6 HOURS PRN
Qty: 20 TABLET | Refills: 0 | Status: SHIPPED | OUTPATIENT
Start: 2024-11-21

## 2024-11-21 NOTE — LETTER
AUTHORIZATION FOR RELEASE OF   CONFIDENTIAL INFORMATION    Dear [facility],    We are seeing Gary Moscoso, date of birth 1970, in the clinic at [facility]. Brit Obando NP is the patient's PCP. Gary Moscoso has an outstanding lab/procedure at the time we reviewed his chart. In order to help keep his health information updated, he has authorized us to request the following medical record(s):        (  )  MAMMOGRAM                                      (  )  COLONOSCOPY      (  )  PAP SMEAR                                          (  )  OUTSIDE LAB RESULTS     (  )  DEXA SCAN                                          (  )  EYE EXAM            (  )  FOOT EXAM                                          (  )  ENTIRE RECORD     ( X )  OUTSIDE IMMUNIZATIONS                 ( X )shingles vaccine  _______________         Please fax records to Brit Obando NP, [phone]     If you have any questions, please contact office at [phone].           Patient Name: Gary Moscoso  : 1970  Patient Phone #: [phone]              Gary Moscoso  MRN: [id number]  : 1970  Age: 53 y.o.  Sex: male         Patient/Legal Guardian Signature  This signature was collected at 2023    Gary Moscoso     Self  _______________________________   Printed Name/Relationship to Patient      Consent for Examination and Treatment: I hereby authorize the providers and employees of [facility] ([facility]) to provide medical treatment/services which includes, but is not limited to, performing and administering tests and diagnostic procedures that are deemed necessary, including, but not limited to, imaging examinations, blood tests and other laboratory procedures as may be required by the hospital, clinic, or may be ordered by my physician(s) or persons working under the general and/or special instructions of my physician(s).      I understand and agree that this consent covers  all authorized persons, including but not limited to physicians, residents, nurse practitioners, physicians' assistants, specialists, consultants, student nurses, and independently contracted physicians, who are called upon by the physician in charge, to carry out the diagnostic procedures and medical or surgical treatment.     I hereby authorize [facility] to retain or dispose of any specimens or tissue, should there be such remaining from any test or procedure.     I hereby authorize and give consent for [facility] providers and employees to take photographs, images or videotapes of such diagnostic, surgical or treatment procedures of Patient as may be required by [facility] or as may be ordered by a physician. I further acknowledge and agree that [facility] may use cameras or other devices for patient monitoring.     I am aware that the practice of medicine is not an exact science, and I acknowledge that no guarantees have been made to me as to the outcome of any tests, procedures or treatment.     Authorization for Release of Information: I understand that my insurance company and/or their agents may need information necessary to make determinations about payment/reimbursement. I hereby provide authorization to release to all insurance companies, their successors, assignees, other parties with whom they may have contracted, or others acting on their behalf, that are involved with payment for any hospital and/or clinic charges incurred by the patient, any information that they request and deem necessary for payment/reimbursement, and/or quality review.  I further authorize the release of my health information to physicians or other health care practitioners on staff who are involved in my health care now and in the future, and to other health care providers, entities, or institutions for the purpose of my continued care and treatment, including referrals.     REGISTRATION AUTHORIZATION  Form No. 73026 (Rev. 7/13/2022)        Medicare Patient's Certification and Authorization to Release Information and Payment Request:  I certify that the information given by me in applying for payment under Title XVIII of the Social Security Act is correct. I authorize any motley of medical or other information about me to release to the Social SecurityAdministration, or its intermediaries or carriers, any information needed for this or a related Medicare claim. I request that payment of authorized benefits be made on my behalf.     Assignment of Insurance Benefits:   I hereby authorize any and all insurance companies, health plans, defined   benefit plans, health insurers or any entity that is or may be responsible for payment of my medical expenses to pay all hospital and medical benefits now due, and to become due and payable to me under any hospital benefits, sick benefits, injury benefits or any other benefit for services rendered to me, including Major Medical Benefits, direct to [facility] and all independently contracted physicians. I assign any and all rights that I may have against any and all insurance companies, health plans, defined benefit plans, health insurers or any entity that is or may be responsible for payment of my medical expenses, including, but not limited to any right to appeal a denial of a claim, any right to bring any action, lawsuit, administrative proceeding, or other cause of action on my behalf. I specifically assign my right to pursue litigation against any and all insurance companies, health plans, defined benefit plans, health insurers or any entity that is or may be responsible for payment of my medical expenses based upon a refusal to pay charges.            E. Valuables: It is understood and agreed that [facility] is not liable for the damage to or loss of any money, jewelry,   documents, dentures, eye glasses, hearing aids, prosthetics, or other property of value.     F. Computer Equipment: I understand and agree that  should I choose to use computer equipment owned by [facility] or if I choose to access the Internet via [facility]s network, I do so at my own risk. [facility] is not responsible for any damage to my computer equipment or to any damages of any type that might arise from my loss of equipment or data.     G. Acceptance of Financial Responsibility:  I agree that in consideration of the services and   supplies that have been   or will be furnished to the patient, I am hereby obligated to pay all charges made for or on the account of the patient according to the standard rates (in effect at the time the services and supplies are delivered) established by [facility], including its Patient Financial Assistance Policy to the extent it is applicable. I understand that I am responsible for all charges, or portions thereof, not covered by insurance or other sources. Patient refunds will be distributed only after balances at all [facility] facilities are paid.     H. Communication Authorization:  I hereby authorize [facility] and its representatives, along with any billing service   or  who may work on their behalf, to contact me on   my cell phone and/or home phone using pre- recorded messages, artificial voice messages, automatic telephone dialing devices or other computer assisted technology, or by electronic      mail, text messaging, or by any other form of electronic communication. This includes, but is not limited to, appointment reminders, yearly physical exam reminders, preventive care reminders, patient campaigns, welcome calls, and calls about account balances on my account or any account on which I am listed as a guarantor. I understand I have the right to opt out of these communications at any time.      Relationship  Between  Facility and  Provider:      I understand that some, but not all, providers furnishing services to the patient are not employees or agents of [facility]. The patient is under the care and  supervision of his/her attending physician, and it is the responsibility of the facility and its nursing staff to carry out the instructions of such physicians. It is the responsibility of the patient's physician/designee to obtain the patient's informed consent, when required, for medical or surgical treatment, special diagnostic or therapeutic procedures, or hospital services rendered for the patient under the special instructions of the physician/designee.     REGISTRATION AUTHORIZATION  Form No. 55261 (Rev. 7/13/2022)      Notice of Privacy Practices: I acknowledge I have received a copy of [facility]'s Notice of Privacy Practices.     Facility  Directory: I have discussed with the organization my desire to be either included or excluded  in the facility directory in the event of my being an inpatient at an [facility] facility. I understand that if my choice is to opt-out of being identified in the facility directory that the facility will not provide any information about me such as my condition (e.g. fair, stable, etc.) or my location in the facility (e.g., room number, department).     Immunizations: [facility] shares immunization information with state sponsored health departments to help you and your doctor keep track of your immunization records. By signing, you consent to have this information shared with the health department in your state:                                [place] - LINKS ([place] Immunization Network for Kids Statewide)                                [place] - MIIX ([place] Immunization Information eXchange)                                [place] - ImmPRINT (Immunization Patient Registry with Integrated Technology)     TERM: This authorization is valid for this and subsequent care/treatment I receive at [facility] and will remain valid unless/until revoked in writing by me.     [facility]: As used in this document, [facility] means all [facility] owned and managed facilities,  including, but not limited to, all health centers, surgery centers, clinics, urgent care centers, and hospitals.         [facility] complies with applicable Federal civil rights laws and does not discriminate on the basis of race, color, national origin, age, disability, or sex.  ATENCIÓN: si habla jennyañol, tiene a marroquin disposición servicios gratuitos de asistencia lingüística. Tyree al [phone].  CHÚ Ý: N?u b?n nói Ti?ng Vi?t, có các d?ch v? h? tr? ngôn ng? mi?n phí dành cho b?n. G?i s? [phone].        REGISTRATION AUTHORIZATION  Form No. 35676 (Rev. 7/13/2022)

## 2024-11-25 DIAGNOSIS — G89.29 CHRONIC LOW BACK PAIN WITH BILATERAL SCIATICA: Primary | ICD-10-CM

## 2024-11-25 DIAGNOSIS — M54.41 CHRONIC LOW BACK PAIN WITH BILATERAL SCIATICA: Primary | ICD-10-CM

## 2024-11-25 DIAGNOSIS — M54.42 CHRONIC LOW BACK PAIN WITH BILATERAL SCIATICA: Primary | ICD-10-CM

## 2024-11-27 ENCOUNTER — TELEPHONE (OUTPATIENT)
Dept: FAMILY MEDICINE | Facility: CLINIC | Age: 54
End: 2024-11-27
Payer: MEDICARE

## 2024-11-27 NOTE — TELEPHONE ENCOUNTER
Attempted to contact pt in regards to lab     Patient came to clinic and labs were discussed, pt given low fat, low cholesterol diet information.

## 2024-11-27 NOTE — TELEPHONE ENCOUNTER
----- Message from Brit Obando NP sent at 11/26/2024  5:17 PM CST -----  Cholesterol elevated. However, he was not fasting at time of lab draw. Encourage him to follow low fat/low cholesterol diet with increased exercise as tolerated.

## 2024-12-03 ENCOUNTER — CLINICAL SUPPORT (OUTPATIENT)
Dept: REHABILITATION | Facility: HOSPITAL | Age: 54
End: 2024-12-03
Payer: MEDICARE

## 2024-12-03 DIAGNOSIS — M54.42 CHRONIC LOW BACK PAIN WITH BILATERAL SCIATICA, UNSPECIFIED BACK PAIN LATERALITY: Primary | ICD-10-CM

## 2024-12-03 DIAGNOSIS — M54.41 CHRONIC LOW BACK PAIN WITH BILATERAL SCIATICA, UNSPECIFIED BACK PAIN LATERALITY: Primary | ICD-10-CM

## 2024-12-03 DIAGNOSIS — M54.42 CHRONIC LOW BACK PAIN WITH BILATERAL SCIATICA: ICD-10-CM

## 2024-12-03 DIAGNOSIS — M54.41 CHRONIC LOW BACK PAIN WITH BILATERAL SCIATICA: ICD-10-CM

## 2024-12-03 DIAGNOSIS — G89.29 CHRONIC LOW BACK PAIN WITH BILATERAL SCIATICA, UNSPECIFIED BACK PAIN LATERALITY: Primary | ICD-10-CM

## 2024-12-03 DIAGNOSIS — G89.29 CHRONIC LOW BACK PAIN WITH BILATERAL SCIATICA: ICD-10-CM

## 2024-12-03 PROCEDURE — 97161 PT EVAL LOW COMPLEX 20 MIN: CPT | Mod: PN

## 2024-12-03 PROCEDURE — 97110 THERAPEUTIC EXERCISES: CPT | Mod: PN
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
## 2024-12-06 ENCOUNTER — CLINICAL SUPPORT (OUTPATIENT)
Dept: REHABILITATION | Facility: HOSPITAL | Age: 54
End: 2024-12-06
Payer: MEDICARE

## 2024-12-06 DIAGNOSIS — G89.29 CHRONIC BILATERAL LOW BACK PAIN WITH BILATERAL SCIATICA: Primary | ICD-10-CM

## 2024-12-06 DIAGNOSIS — M54.41 CHRONIC BILATERAL LOW BACK PAIN WITH BILATERAL SCIATICA: Primary | ICD-10-CM

## 2024-12-06 DIAGNOSIS — M54.42 CHRONIC BILATERAL LOW BACK PAIN WITH BILATERAL SCIATICA: Primary | ICD-10-CM

## 2024-12-06 DIAGNOSIS — M25.69 DECREASED RANGE OF MOTION OF TRUNK AND BACK: ICD-10-CM

## 2024-12-06 PROCEDURE — 97530 THERAPEUTIC ACTIVITIES: CPT | Mod: PN,CQ

## 2024-12-06 PROCEDURE — 97112 NEUROMUSCULAR REEDUCATION: CPT | Mod: PN,CQ

## 2024-12-06 PROCEDURE — 97014 ELECTRIC STIMULATION THERAPY: CPT | Mod: PN,CQ

## 2024-12-06 NOTE — PROGRESS NOTES
Physical Therapy Treatment Note     Name: Gary Mai [facility]  Clinic Number: [id number]    Therapy Diagnosis: No diagnosis found.  Physician: Liza Lowe, FNP    Visit Date: 12/6/2024  Physician Orders: PT Eval and Treat    Medical Diagnosis from Referral: low back pain m54.42 m54.41 with bilateral sciatica   Evaluation Date: 12/3/2024  Authorization Period Expiration: 2/3/2025   Plan of Care Expiration: humana  Visit # / Visits authorized: 2/ 16  PTA Visit #: 1    Time In: 925  Time Out: 1020  Total Billable Time: 55 minutes    Precautions: Standard      Subjective     Pt reports: I rode in [facility] and had to sit in one position for several hours last night and I was hurting after.  He was compliant with home exercise program.  Response to previous treatment: soreness  Functional change: ongoing    Pain: 4/10  Location: bilateral back      Objective     Range of motion   Hip flexion   right  105      left  110        Gary participated in neuromuscular re-education activities to improve: Balance, Coordination, and Posture for 15 minutes. The following activities were included:  Slant board x 2'   Bilateral hamstrings stretch on step x 5  Bilateral ITB stretch x 5   Swissball knee flexion x 10  Swissball trunk rotation x 10  Bilateral piriformis stretch x 5      Gary participated in dynamic functional therapeutic activities to improve functional performance for 20 minutes, including:  Double support squats total gym x 20 to simulate bending and squatting  Double support calf raises total gym x 20 to simulate reaching on tip toes  Sitting swissball trunk roll outs x 10 to promote donning/doffing shoes  Sitting bilateral lateral roll outs x 10 to promote reaching to the side  Biking x 5' to promote endurance with walking      Gary received the following supervised modalities after being cleared for contradictions: IFC Electrical Stimulation:  Gary received IFC Electrical Stimulation for pain control  applied to the low back. Pt received stimulation at 100 % scan at a frequency of 12 for 20 minutes. Gary tolderated treatment well without any adverse effects.      Gary received hot pack for 20 minutes to low back.      Home Exercises Provided and Patient Education Provided     Education provided: home exercise program     Written Home Exercises Provided: Patient instructed to cont prior HEP.  Exercises were reviewed and Gary was able to demonstrate them prior to the end of the session.  Gary demonstrated good  understanding of the education provided.     See EMR under Patient Instructions for exercises provided prior visit.    Assessment     Patient voicing decreased pain 0/10 after treatment, had had improved bilateral hip range of motion this session.  Gary Is progressing well towards his goals.   Pt prognosis is Good.     Pt will continue to benefit from skilled outpatient physical therapy to address the deficits listed in the problem list box on initial evaluation, provide pt/family education and to maximize pt's level of independence in the home and community environment.     Pt's spiritual, cultural and educational needs considered and pt agreeable to plan of care and goals.     Anticipated barriers to physical therapy: compliance with home exercise program     Goals:  Short Term Goals: 4  weeks   Pt will be independent with home ex program   Decrease worse pain to 8/10   Be able to increase all lower extremity strength to 4/5   Increase bilateral hip flexion to 120 degrees      Long Term Goals: 8  weeks   Be able to tolerate 20 minutes of cardio   Be able to return to yard work with pain less than 4/10   Be able to sleep all night without awaking with pain   Increase mmt to 5/5 through out lower extremities    Plan   Plan of care Certification: 12/3/2024 to 2/3/2025 .     Outpatient Physical Therapy 2 times weekly for 8 weeks to include the following interventions: Electrical Stimulation ifc , Manual  Therapy, Neuromuscular Re-ed, Patient Education, and Therapeutic Activities. Plan of care reviewed with González Solo, PT.      Odessa Colon, PTA  12/6/2024

## 2024-12-10 ENCOUNTER — CLINICAL SUPPORT (OUTPATIENT)
Dept: REHABILITATION | Facility: HOSPITAL | Age: 54
End: 2024-12-10
Payer: MEDICARE

## 2024-12-10 DIAGNOSIS — G89.29 CHRONIC BILATERAL LOW BACK PAIN WITH BILATERAL SCIATICA: ICD-10-CM

## 2024-12-10 DIAGNOSIS — M54.42 CHRONIC BILATERAL LOW BACK PAIN WITH BILATERAL SCIATICA: ICD-10-CM

## 2024-12-10 DIAGNOSIS — M54.41 CHRONIC BILATERAL LOW BACK PAIN WITH BILATERAL SCIATICA: ICD-10-CM

## 2024-12-10 DIAGNOSIS — M25.69 DECREASED RANGE OF MOTION OF TRUNK AND BACK: Primary | ICD-10-CM

## 2024-12-10 PROCEDURE — 97014 ELECTRIC STIMULATION THERAPY: CPT | Mod: PN,CQ

## 2024-12-10 PROCEDURE — 97112 NEUROMUSCULAR REEDUCATION: CPT | Mod: PN,CQ

## 2024-12-10 PROCEDURE — 97530 THERAPEUTIC ACTIVITIES: CPT | Mod: PN,CQ

## 2024-12-10 NOTE — PROGRESS NOTES
Physical Therapy Treatment Note     Name: Gary Mai [facility]  Clinic Number: [id number]    Therapy Diagnosis:   Encounter Diagnoses   Name Primary?    Decreased range of motion of trunk and back Yes    Chronic bilateral low back pain with bilateral sciatica      Physician: Liza Lowe, YAQUELIN    Visit Date: 12/10/2024  Physician Orders: PT Eval and Treat    Medical Diagnosis from Referral: low back pain m54.42 m54.41 with bilateral sciatica   Evaluation Date: 12/3/2024  Authorization Period Expiration: 2/3/2025   Plan of Care Expiration: humana  Visit # / Visits authorized: 3/ 16  PTA Visit #: 2    Time In: 1050  Time Out: 1145  Total Billable Time: 55 minutes    Precautions: Standard      Subjective     Pt reports: I started back on the neurontin and it's has helped with my pain.  He was compliant with home exercise program.  Response to previous treatment: soreness  Functional change: ongoing    Pain: 2/10  Location: bilateral back      Objective     Range of motion   Hip flexion   right  110      left  111     Gary participated in neuromuscular re-education activities to improve: Balance, Coordination, and Posture for 15 minutes. The following activities were included:  Slant board x 2'   Bilateral hamstrings stretch on step x 5  Bilateral ITB stretch x 5   Swissball knee flexion x 10  Swissball trunk rotation x 10  Bilateral piriformis stretch x 5    Gary participated in dynamic functional therapeutic activities to improve functional performance for 20 minutes, including:  Double support squats total gym x 20 to simulate bending and squatting  Double support calf raises total gym x 20 to simulate reaching on tip toes  Double support leg presses 20 x 80# to simulate bending and squatting  Sitting swissball trunk roll outs x 10 to promote donning/doffing shoes  Sitting bilateral lateral roll outs x 10 to promote reaching to the side  Biking x 5'  intensity  2 to promote endurance with walking    Gary received  the following supervised modalities after being cleared for contradictions: IFC Electrical Stimulation:  Gary received IFC Electrical Stimulation for pain control applied to the low back. Pt received stimulation at 100 % scan at a frequency of 12 for 20 minutes. Gary tolderated treatment well without any adverse effects.      Gary received hot pack for 20 minutes to low back.    Home Exercises Provided and Patient Education Provided     Education provided: home exercise program     Written Home Exercises Provided: Patient instructed to cont prior HEP.  Exercises were reviewed and Gary was able to demonstrate them prior to the end of the session.  Gary demonstrated good  understanding of the education provided.     See EMR under Patient Instructions for exercises provided prior visit.    Assessment     Patient progressing gradually with hip flexion range of motion.   Gary Is progressing well towards his goals.   Pt prognosis is Good.     Pt will continue to benefit from skilled outpatient physical therapy to address the deficits listed in the problem list box on initial evaluation, provide pt/family education and to maximize pt's level of independence in the home and community environment.     Pt's spiritual, cultural and educational needs considered and pt agreeable to plan of care and goals.     Anticipated barriers to physical therapy: compliance with home exercise program     Goals:  Short Term Goals: 4  weeks   Pt will be independent with home ex program   Decrease worse pain to 8/10   Be able to increase all lower extremity strength to 4/5   Increase bilateral hip flexion to 120 degrees      Long Term Goals: 8  weeks   Be able to tolerate 20 minutes of cardio   Be able to return to yard work with pain less than 4/10   Be able to sleep all night without awaking with pain   Increase mmt to 5/5 through out lower extremities    Plan   Plan of care Certification: 12/3/2024 to 2/3/2025 .     Outpatient Physical  Therapy 2 times weekly for 8 weeks to include the following interventions: Electrical Stimulation ifc , Manual Therapy, Neuromuscular Re-ed, Patient Education, and Therapeutic Activities. Plan of care reviewed with González Solo PT.      Odessa Colon, PTA  12/10/2024

## 2024-12-12 ENCOUNTER — CLINICAL SUPPORT (OUTPATIENT)
Dept: REHABILITATION | Facility: HOSPITAL | Age: 54
End: 2024-12-12
Payer: MEDICARE

## 2024-12-12 DIAGNOSIS — M25.69 DECREASED RANGE OF MOTION OF TRUNK AND BACK: Primary | ICD-10-CM

## 2024-12-12 DIAGNOSIS — M54.41 CHRONIC BILATERAL LOW BACK PAIN WITH BILATERAL SCIATICA: ICD-10-CM

## 2024-12-12 DIAGNOSIS — M54.42 CHRONIC BILATERAL LOW BACK PAIN WITH BILATERAL SCIATICA: ICD-10-CM

## 2024-12-12 DIAGNOSIS — G89.29 CHRONIC BILATERAL LOW BACK PAIN WITH BILATERAL SCIATICA: ICD-10-CM

## 2024-12-12 PROCEDURE — 97530 THERAPEUTIC ACTIVITIES: CPT | Mod: PN,CQ

## 2024-12-12 PROCEDURE — 97112 NEUROMUSCULAR REEDUCATION: CPT | Mod: PN,CQ

## 2024-12-12 PROCEDURE — 97014 ELECTRIC STIMULATION THERAPY: CPT | Mod: PN,CQ

## 2024-12-12 NOTE — PROGRESS NOTES
Physical Therapy Treatment Note     Name: Gary Mai [facility]  Clinic Number: [id number]    Therapy Diagnosis:   Encounter Diagnoses   Name Primary?    Decreased range of motion of trunk and back Yes    Chronic bilateral low back pain with bilateral sciatica      Physician: Liza Lowe, YAQUELIN    Visit Date: 12/12/2024  Physician Orders: PT Eval and Treat    Medical Diagnosis from Referral: low back pain m54.42 m54.41 with bilateral sciatica   Evaluation Date: 12/3/2024  Authorization Period Expiration: 2/3/2025   Plan of Care Expiration: humana  Visit # / Visits authorized: 4/ 16  PTA Visit #: 3    Time In: 1055  Time Out: 1145  Total Billable Time: 50 minutes    Precautions: Standard      Subjective     Pt reports: I'm a little better, my calves aren't sore today.  He was compliant with home exercise program.  Response to previous treatment: soreness  Functional change: ongoing    Pain: 2/10  Location: bilateral back      Objective     Range of motion   Hip flexion   right  110      left  111     Gary participated in neuromuscular re-education activities to improve: Balance, Coordination, and Posture for 15 minutes. The following activities were included:  Slant board x 2'   Bilateral hamstrings stretch on step x 5  Bilateral ITB stretch x 5   Swissball knee flexion x 10  Swissball trunk rotation x 10  Bilateral piriformis stretch x 5    Gary participated in dynamic functional therapeutic activities to improve functional performance for 20 minutes, including:  Double support squats total gym x 20 to simulate bending and squatting  Double support calf raises total gym x 20 to simulate reaching on tip toes  Double support leg presses 20 x 85# to simulate bending and squatting  Double support calf presses 20 x 85# to simulate reaching on tip toes  Sitting swissball trunk roll outs x 10 to promote donning/doffing shoes  Sitting bilateral lateral roll outs x 10 to promote reaching to the side  Biking x 5'  intensity  2 to promote endurance with walking    Gary received the following supervised modalities after being cleared for contradictions: IFC Electrical Stimulation:  Gary received IFC Electrical Stimulation for pain control applied to the low back. Pt received stimulation at 100 % scan at a frequency of 13 for 15 minutes. Gary tolderated treatment well without any adverse effects.      Gary received hot pack for 15 minutes to low back.    Home Exercises Provided and Patient Education Provided     Education provided: home exercise program     Written Home Exercises Provided: Patient instructed to cont prior HEP.  Exercises were reviewed and Gary was able to demonstrate them prior to the end of the session.  Gary demonstrated good  understanding of the education provided.     See EMR under Patient Instructions for exercises provided prior visit.    Assessment     Patient progressing with lower extremity strengthening, able to increase weight with leg presses.  Gary Is progressing well towards his goals.   Pt prognosis is Good.     Pt will continue to benefit from skilled outpatient physical therapy to address the deficits listed in the problem list box on initial evaluation, provide pt/family education and to maximize pt's level of independence in the home and community environment.     Pt's spiritual, cultural and educational needs considered and pt agreeable to plan of care and goals.     Anticipated barriers to physical therapy: compliance with home exercise program     Goals:  Short Term Goals: 4  weeks   Pt will be independent with home ex program   Decrease worse pain to 8/10   Be able to increase all lower extremity strength to 4/5   Increase bilateral hip flexion to 120 degrees      Long Term Goals: 8  weeks   Be able to tolerate 20 minutes of cardio   Be able to return to yard work with pain less than 4/10   Be able to sleep all night without awaking with pain   Increase mmt to 5/5 through out lower  extremities    Plan   Plan of care Certification: 12/3/2024 to 2/3/2025 .     Outpatient Physical Therapy 2 times weekly for 8 weeks to include the following interventions: Electrical Stimulation ifc , Manual Therapy, Neuromuscular Re-ed, Patient Education, and Therapeutic Activities. Plan of care reviewed with González Solo, MOHSEN.      Odessa Colon, PTA  12/12/2024

## 2024-12-17 ENCOUNTER — CLINICAL SUPPORT (OUTPATIENT)
Dept: REHABILITATION | Facility: HOSPITAL | Age: 54
End: 2024-12-17
Payer: MEDICARE

## 2024-12-17 DIAGNOSIS — M25.69 DECREASED RANGE OF MOTION OF TRUNK AND BACK: Primary | ICD-10-CM

## 2024-12-17 PROCEDURE — 97014 ELECTRIC STIMULATION THERAPY: CPT | Mod: PN,CQ

## 2024-12-17 PROCEDURE — 97112 NEUROMUSCULAR REEDUCATION: CPT | Mod: PN,CQ

## 2024-12-17 PROCEDURE — 97530 THERAPEUTIC ACTIVITIES: CPT | Mod: PN,CQ

## 2024-12-17 NOTE — PROGRESS NOTES
Physical Therapy Treatment Note     Name: Gary Mai [facility]  Clinic Number: [id number]    Therapy Diagnosis:   Encounter Diagnosis   Name Primary?    Decreased range of motion of trunk and back Yes     Physician: Liza Lowe FNP    Visit Date: 12/17/2024  Physician Orders: PT Eval and Treat    Medical Diagnosis from Referral: low back pain m54.42 m54.41 with bilateral sciatica   Evaluation Date: 12/3/2024  Authorization Period Expiration: 2/3/2025   Plan of Care Expiration: humana  Visit # / Visits authorized: 5/ 16  PTA Visit #: 4    Time In: 1055  Time Out: 1145  Total Billable Time: 50 minutes    Precautions: Standard      Subjective     Pt reports: I couldn't even lift my left leg yesterday, I dont know why my back flares up like it does.  He was compliant with home exercise program.  Response to previous treatment: soreness  Functional change: ongoing    Pain: 3/10  Location: bilateral back      Objective     Range of motion   Hip flexion   right  110      left  111     Gary participated in neuromuscular re-education activities to improve: Balance, Coordination, and Posture for 15 minutes. The following activities were included:  Slant board x 2'   Bilateral hamstrings stretch on step x 5  Bilateral ITB stretch x 5   Swissball knee flexion x 10  Swissball trunk rotation x 10  Bilateral piriformis stretch x 5    Gary participated in dynamic functional therapeutic activities to improve functional performance for 20 minutes, including:  Double support squats total gym x 20 to simulate bending and squatting  Double support calf raises total gym x 20 to simulate reaching on tip toes  Double support leg presses 20 x 85# to simulate bending and squatting  Double support calf presses 20 x 85# to simulate reaching on tip toes  Sitting swissball trunk roll outs x 10 to promote donning/doffing shoes  Sitting bilateral lateral roll outs x 10 to promote reaching to the side  Biking x 5' intensity 2 to promote  5/3/2022      Chief Complaint   Patient presents with    Prostate Cancer     Assessment and Plan    72 y o  male managed by Dr Cordelia Payton    1  Prostate cancer  · Status post robot assisted laparoscopic prostatectomy 03/07/2017  · PSA performed 04/20/2022 resulted less than 0 01  · Repeat PSA in 1 year    2  Erectile dysfunction  · Continue with Tri Mix (100/3 0/30)    3  Stress incontinence  · Continue with pelvic floor/Kegel exercises    4  Nocturia  · Discussed dietary behavior modifications reduce irritative symptoms  · Ensure complete bladder emptying with urination  · No caffeinated beverages after 3:00 p m  · Continue to monitor for worsening/progression of symptoms      History of Present Illness  Rachael Case is a 72 y o  male here for follow up evaluation of  prostate cancer  Alisa Gauthier is status post robot assisted laparoscopic prostatectomy 03/07/2017 for [place] (4+3) prostate cancer    PSA trend below   He continues to report occasional stress incontinence but is not bothered by this at this time  Ivy Martinez continues with Kegel and pelvic floor exercises   Patient does report in increase in nocturia   He does admit to limited/decrease intake of water during the day  Component PSA, Total   Latest Ref Rng & Units 0 0 - 4 0 ng/mL   2/19/2018 <0 1   5/18/2018 <0 1   9/5/2018 <0 1   12/7/2018 <0 1   2/15/2019 <0 1   5/22/2019 <0 1   9/22/2021 <0 1   04/28/2022 <0 01          Review of Systems   Constitutional: Negative for chills and fever  Respiratory: Negative for cough and shortness of breath  Cardiovascular: Negative for chest pain  Gastrointestinal: Negative for abdominal distention, abdominal pain, blood in stool, nausea and vomiting  Genitourinary: Negative for difficulty urinating, dysuria, enuresis, flank pain, frequency, hematuria and urgency  Skin: Negative for rash             AUA SYMPTOM SCORE      Most Recent Value   AUA SYMPTOM SCORE    How often have you had a sensation of not emptying your bladder completely after you finished urinating? 1   How often have you had to urinate again less than two hours after you finished urinating? 4   How often have you found you stopped and started again several times when you urinate? 3   How often have you found it difficult to postpone urination? 0   How often have you had a weak urinary stream? 1   How often have you had to push or strain to begin urination? 0   How many times did you most typically get up to urinate from the time you went to bed at night until the time you got up in the morning?  3   Quality of Life: If you were to spend the rest of your life with your urinary condition just the way it is now, how would you feel about that? 1   AUA SYMPTOM SCORE 12             Past Medical History  Past Medical History:   Diagnosis Date    Asthma     stress/ sports induced    BPH (benign prostatic hyperplasia)     Cancer ([facility] [address] )     prostate    Colon polyp     Diverticulosis        Past Social History  Past Surgical History:   Procedure Laterality Date    COLONOSCOPY  04/10/2015    HERNIA REPAIR      KNEE ARTHROSCOPY      with Medial Meniscus Repair    MA LAP,PROSTATECTOMY,RADICAL,W/NERVE SPARE,INCL ROBOTIC N/A 3/1/2017    Procedure: RADICAL ROBOTIC PROSTATECTOMY W/ BILATERAL PELVIC LYMPH NODE DISSECTION;  Surgeon: Harish Hollis MD;  Location: [facility];  Service: Urology    TONSILLECTOMY       Social History     Tobacco Use   Smoking Status Never Smoker   Smokeless Tobacco Never Used       Past Family History  Family History   Problem Relation Age of Onset    Cancer Mother     Cancer Father     Cancer Brother        Past Social history  Social History     Socioeconomic History    Marital status: /Civil Union     Spouse name: Not on file    Number of children: Not on file    Years of education: Not on file    Highest education level: Not on file   Occupational History    Not on file   Tobacco Use    Smoking status: Never endurance with walking    Gary received the following supervised modalities after being cleared for contradictions: IFC Electrical Stimulation:  Gary received IFC Electrical Stimulation for pain control applied to the low back. Pt received stimulation at 100 % scan at a frequency of 19 for 15 minutes. Gary tolderated treatment well without any adverse effects.      Gary received hot pack for 15 minutes to low back.    Home Exercises Provided and Patient Education Provided     Education provided: home exercise program     Written Home Exercises Provided: Patient instructed to cont prior HEP.  Exercises were reviewed and Gary was able to demonstrate them prior to the end of the session.  Gary demonstrated good  understanding of the education provided.     See EMR under Patient Instructions for exercises provided prior visit.    Assessment     Patient fatigues quicker with left lower extremity compared to right with activities.  Gary Is progressing well towards his goals.   Pt prognosis is Good.     Pt will continue to benefit from skilled outpatient physical therapy to address the deficits listed in the problem list box on initial evaluation, provide pt/family education and to maximize pt's level of independence in the home and community environment.     Pt's spiritual, cultural and educational needs considered and pt agreeable to plan of care and goals.     Anticipated barriers to physical therapy: compliance with home exercise program     Goals:  Short Term Goals: 4  weeks   Pt will be independent with home ex program   Decrease worse pain to 8/10   Be able to increase all lower extremity strength to 4/5   Increase bilateral hip flexion to 120 degrees      Long Term Goals: 8  weeks   Be able to tolerate 20 minutes of cardio   Be able to return to yard work with pain less than 4/10   Be able to sleep all night without awaking with pain   Increase mmt to 5/5 through out lower extremities    Plan   Plan of care  Certification: 12/3/2024 to 2/3/2025 .     Outpatient Physical Therapy 2 times weekly for 8 weeks to include the following interventions: Electrical Stimulation ifc , Manual Therapy, Neuromuscular Re-ed, Patient Education, and Therapeutic Activities. Plan of care reviewed with González Solo PT.      [place] Colon, PTA  12/17/2024              Smoker    Smokeless tobacco: Never Used   Substance and Sexual Activity    Alcohol use: Yes     Alcohol/week: 2 0 standard drinks     Types: 2 Glasses of wine per week     Comment: with dinner every night    Drug use: No    Sexual activity: Not on file   Other Topics Concern    Not on file   Social History Narrative    Not on file     Social Determinants of Health     Financial Resource Strain: Not on file   Food Insecurity: Not on file   Transportation Needs: Not on file   Physical Activity: Not on file   Stress: Not on file   Social Connections: Not on file   Intimate Partner Violence: Not on file   Housing Stability: Not on file       Current Medications  Current Outpatient Medications   Medication Sig Dispense Refill    ALBUTEROL IN Inhale 2 puffs 4 (four) times a day as needed        ALBUTEROL IN Inhale 1 puff every 6 (six) hours as needed      JUBLIA 10 % SOLN       montelukast (SINGULAIR) 10 mg tablet Take 10 mg by mouth daily at bedtime      Papav-Phentolamine-Alprostadil (PGE1 / PHENTOLAMINE / PAPAVERINE, STANDARD TRIMIX, 20 MCG / 1 MG / 30 MG INJECTION) COMPOUND TRIMIX 40-1-30 - Inject 0 3mL to 1 0mL IC 20 minutes prior to intercourse  Use no more than 3 times per week   6 mL PRN until 3/1/22    Trelegy Ellipta 100-62 5-25 MCG/INH inhaler       atovaquone-proguanil (MALARONE) 250-100 mg Take 1 tablet by mouth daily with breakfast for 11 days Begin 1 day before entering malaria area and continue daily while in malaria area and for 1 week after leaving area (Patient not taking: Reported on 5/30/2019) 11 tablet 1    docusate sodium (COLACE) 100 mg capsule Take 1 capsule by mouth 2 (two) times a day for 30 days (Patient not taking: Reported on 5/30/2019) 60 capsule 0    HYDROcodone-acetaminophen (NORCO) 5-325 mg per tablet Take 2 tablets by mouth every 4 (four) hours as needed for pain for up to 30 doses Max Daily Amount: 12 tablets (Patient not taking: Reported on 9/22/2020) 30 tablet 0     No current facility-administered medications for this visit  Allergies  No Known Allergies      The following portions of the patient's history were reviewed and updated as appropriate: allergies, current medications, past medical history, past social history, past surgical history and problem list       Vitals  Vitals:    05/03/22 1511   BP: 118/80   Pulse: 60   Weight: 81 6 kg (180 lb)   Height: 6' (1 829 m)     Physical Exam  Physical Exam  Vitals reviewed  Constitutional:       General: He is not in acute distress  Appearance: Normal appearance  He is normal weight  HENT:      Head: Normocephalic  Pulmonary:      Effort: No respiratory distress  Breath sounds: Normal breath sounds  Skin:     General: Skin is warm and dry  Neurological:      General: No focal deficit present  Mental Status: He is alert and oriented to person, place, and time     Psychiatric:         Mood and Affect: Mood normal          Behavior: Behavior normal            Results  No results found for this or any previous visit (from the past 1 hour(s)) ]  Lab Results   Component Value Date    PSA <0 1 09/22/2021    PSA <0 1 05/22/2019    PSA <0 1 02/15/2019     Lab Results   Component Value Date    CALCIUM 9 3 05/18/2018    K 4 5 05/18/2018    CO2 29 05/18/2018     05/18/2018    BUN 23 05/18/2018    CREATININE 1 17 05/18/2018     Lab Results   Component Value Date    WBC 10 33 (H) 03/02/2017    HGB 12 6 03/02/2017    HCT 37 3 03/02/2017    MCV 88 03/02/2017     03/02/2017           Orders  Orders Placed This Encounter   Procedures    PSA Total, Diagnostic     Standing Status:   Future     Standing Expiration Date:   5/3/2023       TOMASA Wang

## 2024-12-19 ENCOUNTER — CLINICAL SUPPORT (OUTPATIENT)
Dept: REHABILITATION | Facility: HOSPITAL | Age: 54
End: 2024-12-19
Payer: MEDICARE

## 2024-12-19 DIAGNOSIS — M54.42 CHRONIC BILATERAL LOW BACK PAIN WITH BILATERAL SCIATICA: ICD-10-CM

## 2024-12-19 DIAGNOSIS — M54.41 CHRONIC BILATERAL LOW BACK PAIN WITH BILATERAL SCIATICA: ICD-10-CM

## 2024-12-19 DIAGNOSIS — G89.29 CHRONIC BILATERAL LOW BACK PAIN WITH BILATERAL SCIATICA: ICD-10-CM

## 2024-12-19 DIAGNOSIS — M25.69 DECREASED RANGE OF MOTION OF TRUNK AND BACK: Primary | ICD-10-CM

## 2024-12-19 PROCEDURE — 97112 NEUROMUSCULAR REEDUCATION: CPT | Mod: PN,CQ

## 2024-12-19 PROCEDURE — 97014 ELECTRIC STIMULATION THERAPY: CPT | Mod: PN,CQ

## 2024-12-19 PROCEDURE — 97530 THERAPEUTIC ACTIVITIES: CPT | Mod: PN,CQ

## 2024-12-24 ENCOUNTER — CLINICAL SUPPORT (OUTPATIENT)
Dept: REHABILITATION | Facility: HOSPITAL | Age: 54
End: 2024-12-24
Payer: MEDICARE

## 2024-12-24 DIAGNOSIS — M25.69 DECREASED RANGE OF MOTION OF TRUNK AND BACK: Primary | ICD-10-CM

## 2024-12-24 PROCEDURE — 97014 ELECTRIC STIMULATION THERAPY: CPT | Mod: PN

## 2024-12-24 PROCEDURE — 97530 THERAPEUTIC ACTIVITIES: CPT | Mod: PN

## 2024-12-24 PROCEDURE — 97112 NEUROMUSCULAR REEDUCATION: CPT | Mod: PN

## 2024-12-24 NOTE — PROGRESS NOTES
Physical Therapy Treatment Note     Name: Gary Mai [facility]  Clinic Number: [id number]    Therapy Diagnosis:   Encounter Diagnosis   Name Primary?    Decreased range of motion of trunk and back Yes     Physician: Liza Lowe FNP    Visit Date: 12/24/2024  Physician Orders: PT Eval and Treat    Medical Diagnosis from Referral: low back pain m54.42 m54.41 with bilateral sciatica   Evaluation Date: 12/3/2024  Authorization Period Expiration: 2/3/2025   Plan of Care Expiration: humana  Visit # / Visits authorized: 7/ 16  PTA Visit #: 0    Time In: 1100  Time Out: 1145  Total Billable Time: 45 minutes    Precautions: Standard      Subjective     Pt reports: I got injection yesterday   He was compliant with home exercise program.  Response to previous treatment: soreness  Functional change: ongoing    Pain: 3/10  Location: bilateral back      Objective     Range of motion   Hip flexion   right  113     left  113    Gary participated in neuromuscular re-education activities to improve: Balance, Coordination, and Posture for 15 minutes. The following activities were included:  Slant board x 2'   Bilateral hamstrings stretch on step x 5  Bilateral ITB stretch x 5   Partial sit ups x 10  Bridging off ball x 10  Swissball knee flexion x 10  Swissball trunk rotation x 10  Bilateral piriformis stretch x 5    Gary participated in dynamic functional therapeutic activities to improve functional performance for 15 minutes, including:  Double support squats total gym x 20 to simulate bending and squatting  Double support calf raises total gym x 20 to simulate reaching on tip toes  Sitting swissball trunk roll outs x 10 to promote donning/doffing shoes  Sitting bilateral lateral roll outs x 10 to promote reaching to the side  Biking x 5' intensity 2 to promote endurance with walking    Gary received the following supervised modalities after being cleared for contradictions: IFC Electrical Stimulation:  Gary received IFC  Electrical Stimulation for pain control applied to the low back. Pt received stimulation at 100 % scan at a frequency of 21 for 15 minutes. Gary tolderated treatment well without any adverse effects.      Gary received hot pack for 15 minutes to low back.    Home Exercises Provided and Patient Education Provided     Education provided: home exercise program     Written Home Exercises Provided: Patient instructed to cont prior HEP.  Exercises were reviewed and Gary was able to demonstrate them prior to the end of the session.  Gary demonstrated good  understanding of the education provided.     See EMR under Patient Instructions for exercises provided prior visit.    Assessment     Patient progressing gradually with hip range of motion, instructed to include partial sit ups with home exercise program.  Gary Is progressing well towards his goals.   Pt prognosis is Good.     Pt will continue to benefit from skilled outpatient physical therapy to address the deficits listed in the problem list box on initial evaluation, provide pt/family education and to maximize pt's level of independence in the home and community environment.     Pt's spiritual, cultural and educational needs considered and pt agreeable to plan of care and goals.     Anticipated barriers to physical therapy: compliance with home exercise program     Goals:  Short Term Goals: 4  weeks   Pt will be independent with home ex program   Decrease worse pain to 8/10   Be able to increase all lower extremity strength to 4/5   Increase bilateral hip flexion to 120 degrees      Long Term Goals: 8  weeks   Be able to tolerate 20 minutes of cardio   Be able to return to yard work with pain less than 4/10   Be able to sleep all night without awaking with pain   Increase mmt to 5/5 through out lower extremities    Plan   Plan of care Certification: 12/3/2024 to 2/3/2025 .     Outpatient Physical Therapy 2 times weekly for 8 weeks to include the following  interventions: Electrical Stimulation ifc , Manual Therapy, Neuromuscular Re-ed, Patient Education, and Therapeutic Activities.      Plan of care has been reestablished with Sandrita LYNN and  Kavitha LYNN,     González Solo, PT  12/24/2024

## 2024-12-26 ENCOUNTER — CLINICAL SUPPORT (OUTPATIENT)
Dept: REHABILITATION | Facility: HOSPITAL | Age: 54
End: 2024-12-26
Payer: MEDICARE

## 2024-12-26 DIAGNOSIS — G89.29 CHRONIC BILATERAL LOW BACK PAIN WITH BILATERAL SCIATICA: ICD-10-CM

## 2024-12-26 DIAGNOSIS — M54.42 CHRONIC BILATERAL LOW BACK PAIN WITH BILATERAL SCIATICA: ICD-10-CM

## 2024-12-26 DIAGNOSIS — M54.41 CHRONIC BILATERAL LOW BACK PAIN WITH BILATERAL SCIATICA: ICD-10-CM

## 2024-12-26 DIAGNOSIS — M25.69 DECREASED RANGE OF MOTION OF TRUNK AND BACK: Primary | ICD-10-CM

## 2024-12-26 PROCEDURE — 97112 NEUROMUSCULAR REEDUCATION: CPT | Mod: PN

## 2024-12-26 PROCEDURE — 97530 THERAPEUTIC ACTIVITIES: CPT | Mod: PN

## 2024-12-26 PROCEDURE — 97014 ELECTRIC STIMULATION THERAPY: CPT | Mod: PN

## 2024-12-26 NOTE — PROGRESS NOTES
Physical Therapy Treatment Note     Name: Gary Mai [facility]  Clinic Number: [id number]    Therapy Diagnosis:   Encounter Diagnosis   Name Primary?    Decreased range of motion of trunk and back Yes     Physician: Liza Lowe FNP    Visit Date: 12/26/2024  Physician Orders: PT Eval and Treat    Medical Diagnosis from Referral: low back pain m54.42 m54.41 with bilateral sciatica   Evaluation Date: 12/3/2024  Authorization Period Expiration: 2/3/2025   Plan of Care Expiration: humana  Visit # / Visits authorized: 8/ 16  PTA Visit #: 0    Time In: 1100  Time Out: 1145  Total Billable Time: 45 minutes    Precautions: Standard      Subjective     Pt reports:  feeling much better .   He was compliant with home exercise program.  Response to previous treatment: soreness  Functional change: ongoing    Pain: 0/10  Location: bilateral back      Objective     Range of motion   Hip flexion   right  113     left  113    Gary participated in neuromuscular re-education activities to improve: Balance, Coordination, and Posture for 15 minutes. The following activities were included:  Slant board x 2'   Bilateral hamstrings stretch on step x 5  Bilateral ITB stretch x 5   Partial sit ups x 10  Bridging off ball x 10  Swissball knee flexion x 10  Swissball trunk rotation x 10  Bilateral piriformis stretch x 5    Gary participated in dynamic functional therapeutic activities to improve functional performance for 15 minutes, including:  Double support squats total gym x 20 to simulate bending and squatting  Double support calf raises total gym x 20 to simulate reaching on tip toes  Sitting swissball trunk roll outs x 10 to promote donning/doffing shoes  Sitting bilateral lateral roll outs x 10 to promote reaching to the side  Biking x 5' intensity 2 to promote endurance with walking    Gary received the following supervised modalities after being cleared for contradictions: IFC Electrical Stimulation:  Gary received IFC  Electrical Stimulation for pain control applied to the low back. Pt received stimulation at 100 % scan at a frequency of 21 for 15 minutes. Gary tolderated treatment well without any adverse effects.      Gary received hot pack for 15 minutes to low back.    Home Exercises Provided and Patient Education Provided     Education provided: home exercise program     Written Home Exercises Provided: Patient instructed to cont prior HEP.  Exercises were reviewed and Gary was able to demonstrate them prior to the end of the session.  Gary demonstrated good  understanding of the education provided.     See EMR under Patient Instructions for exercises provided prior visit.    Assessment   Pt voices great improvement with last injection   Gary Is progressing well towards his goals.   Pt prognosis is Good.     Pt will continue to benefit from skilled outpatient physical therapy to address the deficits listed in the problem list box on initial evaluation, provide pt/family education and to maximize pt's level of independence in the home and community environment.     Pt's spiritual, cultural and educational needs considered and pt agreeable to plan of care and goals.     Anticipated barriers to physical therapy: compliance with home exercise program     Goals:  Short Term Goals: 4  weeks   Pt will be independent with home ex program   Decrease worse pain to 8/10   Be able to increase all lower extremity strength to 4/5   Increase bilateral hip flexion to 120 degrees      Long Term Goals: 8  weeks   Be able to tolerate 20 minutes of cardio   Be able to return to yard work with pain less than 4/10   Be able to sleep all night without awaking with pain   Increase mmt to 5/5 through out lower extremities    Plan   Plan of care Certification: 12/3/2024 to 2/3/2025 .     Outpatient Physical Therapy 2 times weekly for 8 weeks to include the following interventions: Electrical Stimulation ifc , Manual Therapy, Neuromuscular Re-ed,  Patient Education, and Therapeutic Activities.      Plan of care has been reestablished with Sandrita LYNN and  Kavitha LYNN,     González Solo, PT  12/26/2024

## 2024-12-31 ENCOUNTER — CLINICAL SUPPORT (OUTPATIENT)
Dept: REHABILITATION | Facility: HOSPITAL | Age: 54
End: 2024-12-31
Payer: MEDICARE

## 2024-12-31 DIAGNOSIS — M25.69 DECREASED RANGE OF MOTION OF TRUNK AND BACK: Primary | ICD-10-CM

## 2024-12-31 PROCEDURE — 97112 NEUROMUSCULAR REEDUCATION: CPT | Mod: PN

## 2024-12-31 PROCEDURE — 97014 ELECTRIC STIMULATION THERAPY: CPT | Mod: PN

## 2024-12-31 PROCEDURE — 97530 THERAPEUTIC ACTIVITIES: CPT | Mod: PN

## 2024-12-31 NOTE — PROGRESS NOTES
Physical Therapy Treatment Note     Name: Gary Mai [facility]  Clinic Number: [id number]    Therapy Diagnosis:   Encounter Diagnosis   Name Primary?    Decreased range of motion of trunk and back Yes     Physician: Liza Lowe FNP    Visit Date: 12/31/2024  Physician Orders: PT Eval and Treat    Medical Diagnosis from Referral: low back pain m54.42 m54.41 with bilateral sciatica   Evaluation Date: 12/3/2024  Authorization Period Expiration: 2/3/2025   Plan of Care Expiration: human[employer]  Visit # / Visits authorized: 9/ 16  PTA Visit #: 0    Time In: 1100  Time Out: 1145  Total Billable Time: 45 minutes    Precautions: Standard      Subjective     Pt reports:  feeling much better .   He was compliant with home exercise program.  Response to previous treatment: soreness  Functional change: ongoing    Pain: 0/10  Location: bilateral back      Objective     Range of motion   Hip flexion   right  113     left  113    Gary participated in neuromuscular re-education activities to improve: Balance, Coordination, and Posture for 15 minutes. The following activities were included:  Slant board x 2'   Bilateral hamstrings stretch on step x 5  Bilateral ITB stretch x 5   Partial sit ups x 10  Bridging off ball x 10  Swissball knee flexion x 10  Swissball trunk rotation x 10  Bilateral piriformis stretch x 5    Gary participated in dynamic functional therapeutic activities to improve functional performance for 15 minutes, including:  Double support squats total gym x 20 to simulate bending and squatting  Double support calf raises total gym x 20 to simulate reaching on tip toes  Sitting swissball trunk roll outs x 10 to promote donning/doffing shoes  Sitting bilateral lateral roll outs x 10 to promote reaching to the side  Biking x 5' intensity 2 to promote endurance with walking    Gary received the following supervised modalities after being cleared for contradictions: IFC Electrical Stimulation:  Gary received IFC  Electrical Stimulation for pain control applied to the low back. Pt received stimulation at 100 % scan at a frequency of 21 for 15 minutes. Gary tolderated treatment well without any adverse effects.      Gary received hot pack for 15 minutes to low back.    Home Exercises Provided and Patient Education Provided     Education provided: home exercise program     Written Home Exercises Provided: Patient instructed to cont prior HEP.  Exercises were reviewed and Gary was able to demonstrate them prior to the end of the session.  Gary demonstrated good  understanding of the education provided.     See EMR under Patient Instructions for exercises provided prior visit.    Assessment   Pt voices great improvement with last injection   Gary Is progressing well towards his goals.   Pt prognosis is Good.     Pt will continue to benefit from skilled outpatient physical therapy to address the deficits listed in the problem list box on initial evaluation, provide pt/family education and to maximize pt's level of independence in the home and community environment.     Pt's spiritual, cultural and educational needs considered and pt agreeable to plan of care and goals.     Anticipated barriers to physical therapy: compliance with home exercise program     Goals:  Short Term Goals: 4  weeks   Pt will be independent with home ex program   Decrease worse pain to 8/10   Be able to increase all lower extremity strength to 4/5   Increase bilateral hip flexion to 120 degrees      Long Term Goals: 8  weeks   Be able to tolerate 20 minutes of cardio   Be able to return to yard work with pain less than 4/10   Be able to sleep all night without awaking with pain   Increase mmt to 5/5 through out lower extremities    Plan   Plan of care Certification: 12/3/2024 to 2/3/2025 .     Outpatient Physical Therapy 2 times weekly for 8 weeks to include the following interventions: Electrical Stimulation ifc , Manual Therapy, Neuromuscular Re-ed,  Patient Education, and Therapeutic Activities.      Plan of care has been reestablished with Sandrita LYNN and  Kavitha LYNN,     González Solo, PT  12/31/2024

## 2025-01-07 ENCOUNTER — CLINICAL SUPPORT (OUTPATIENT)
Dept: REHABILITATION | Facility: HOSPITAL | Age: 55
End: 2025-01-07
Payer: MEDICARE

## 2025-01-07 DIAGNOSIS — M25.69 DECREASED RANGE OF MOTION OF TRUNK AND BACK: Primary | ICD-10-CM

## 2025-01-07 PROCEDURE — 97112 NEUROMUSCULAR REEDUCATION: CPT | Mod: PN

## 2025-01-07 PROCEDURE — 97014 ELECTRIC STIMULATION THERAPY: CPT | Mod: PN

## 2025-01-07 PROCEDURE — 97110 THERAPEUTIC EXERCISES: CPT | Mod: PN

## 2025-01-07 NOTE — PROGRESS NOTES
Physical Therapy Treatment Note     Name: Gary Mai [facility]  Clinic Number: [id number]    Therapy Diagnosis:   Encounter Diagnosis   Name Primary?    Decreased range of motion of trunk and back Yes     Physician: Liza Lowe FNP    Visit Date: 1/7/2025  Physician Orders: PT Eval and Treat    Medical Diagnosis from Referral: low back pain m54.42 m54.41 with bilateral sciatica   Evaluation Date: 12/3/2024  Authorization Period Expiration: 2/3/2025   Plan of Care Expiration: humana  Visit # / Visits authorized: 10/ 16  PTA Visit #: 0    Time In: 1100  Time Out: 1145  Total Billable Time: 45 minutes    Precautions: Standard      Subjective     Pt reports: right hip and low back is a lot stiffer having to take muscle relaxer   He was compliant with home exercise program.  Response to previous treatment: soreness  Functional change: ongoing    Pain: 0/10  Location: bilateral back      Objective     Range of motion   Hip flexion   right  100     left  11    Gary participated in neuromuscular re-education activities to improve: Balance, Coordination, and Posture for 15 minutes. The following activities were included:  Slant board x 2'   Bilateral hamstrings stretch on step x 5  Bilateral ITB stretch x 5   Partial sit ups x 10  Bridging off ball x 10  Swissball knee flexion x 10  Swissball trunk rotation x 10  Bilateral piriformis stretch x 5    Gary participated in dynamic functional therapeutic activities to improve functional performance for 15 minutes, including:  Double support squats total gym x 20 to simulate bending and squatting  Double support calf raises total gym x 20 to simulate reaching on tip toes  Sitting swissball trunk roll outs x 10 to promote donning/doffing shoes  Sitting bilateral lateral roll outs x 10 to promote reaching to the side  Biking x 5' intensity 2 to promote endurance with walking    Gary received the following supervised modalities after being cleared for contradictions: IFC  Electrical Stimulation:  Gary received IFC Electrical Stimulation for pain control applied to the low back. Pt received stimulation at 100 % scan at a frequency of 21 for 15 minutes. Gary tolderated treatment well without any adverse effects.      Gary received hot pack for 15 minutes to low back.    Home Exercises Provided and Patient Education Provided     Education provided: home exercise program     Written Home Exercises Provided: Patient instructed to cont prior HEP.  Exercises were reviewed and Gary was able to demonstrate them prior to the end of the session.  Gary demonstrated good  understanding of the education provided.     See EMR under Patient Instructions for exercises provided prior visit.    Assessment   Pt voices great improvement with last injection   Gary Is progressing well towards his goals.   Pt prognosis is Good.     Pt will continue to benefit from skilled outpatient physical therapy to address the deficits listed in the problem list box on initial evaluation, provide pt/family education and to maximize pt's level of independence in the home and community environment.     Pt's spiritual, cultural and educational needs considered and pt agreeable to plan of care and goals.     Anticipated barriers to physical therapy: compliance with home exercise program     Goals:  Short Term Goals: 4  weeks   Pt will be independent with home ex program   Decrease worse pain to 8/10   Be able to increase all lower extremity strength to 4/5   Increase bilateral hip flexion to 120 degrees      Long Term Goals: 8  weeks   Be able to tolerate 20 minutes of cardio   Be able to return to yard work with pain less than 4/10   Be able to sleep all night without awaking with pain   Increase mmt to 5/5 through out lower extremities    Plan   Plan of care Certification: 12/3/2024 to 2/3/2025 .     Outpatient Physical Therapy 2 times weekly for 8 weeks to include the following interventions: Electrical Stimulation  ifc , Manual Therapy, Neuromuscular Re-ed, Patient Education, and Therapeutic Activities.      Plan of care has been reestablished with Sandrita Solo, PT  1/7/2025

## 2025-01-09 ENCOUNTER — CLINICAL SUPPORT (OUTPATIENT)
Dept: REHABILITATION | Facility: HOSPITAL | Age: 55
End: 2025-01-09
Payer: MEDICARE

## 2025-01-09 DIAGNOSIS — G89.29 CHRONIC BILATERAL LOW BACK PAIN WITH BILATERAL SCIATICA: ICD-10-CM

## 2025-01-09 DIAGNOSIS — M25.69 DECREASED RANGE OF MOTION OF TRUNK AND BACK: Primary | ICD-10-CM

## 2025-01-09 DIAGNOSIS — M54.42 CHRONIC BILATERAL LOW BACK PAIN WITH BILATERAL SCIATICA: ICD-10-CM

## 2025-01-09 DIAGNOSIS — M54.41 CHRONIC BILATERAL LOW BACK PAIN WITH BILATERAL SCIATICA: ICD-10-CM

## 2025-01-09 PROCEDURE — 97112 NEUROMUSCULAR REEDUCATION: CPT | Mod: PN

## 2025-01-09 PROCEDURE — 97014 ELECTRIC STIMULATION THERAPY: CPT | Mod: PN

## 2025-01-09 PROCEDURE — 97530 THERAPEUTIC ACTIVITIES: CPT | Mod: PN

## 2025-01-09 NOTE — PROGRESS NOTES
Physical Therapy Treatment Note     Name: Gary Mai [facility] [place]  Clinic Number: [id number]    Therapy Diagnosis:   Encounter Diagnoses   Name Primary?    Decreased range of motion of trunk and back Yes    Chronic bilateral low back pain with bilateral sciatica      Physician: Liza Lowe FNP    Visit Date: 1/9/2025  Physician Orders: PT Eval and Treat    Medical Diagnosis from Referral: low back pain m54.42 m54.41 with bilateral sciatica   Evaluation Date: 12/3/2024  Authorization Period Expiration: 2/3/2025   Plan of Care Expiration: humana  Visit # / Visits authorized: 10/ 16  PTA Visit #: 0    Time In: 1100  Time Out: 1145  Total Billable Time: 45 minutes    Precautions: Standard      Subjective     Pt reports: pt voices he is tight in bilateral glutes and right groin   He was compliant with home exercise program.  Response to previous treatment: soreness  Functional change: ongoing    Pain: 0/10  Location: bilateral back      Objective     Range of motion   Hip flexion   right  100     left  110    Gary participated in neuromuscular re-education activities to improve: Balance, Coordination, and Posture for 15 minutes. The following activities were included:  Slant board x 2'   Bilateral hamstrings stretch on step x 5  Bilateral ITB stretch x 5   Partial sit ups x 10  Bridging off ball x 10  Swissball knee flexion x 10  Swissball trunk rotation x 10  Bilateral piriformis stretch x 5    Gary participated in dynamic functional therapeutic activities to improve functional performance for 15 minutes, including:  Double support squats total gym x 20 to simulate bending and squatting  Double support calf raises total gym x 20 to simulate reaching on tip toes  Sitting swissball trunk roll outs x 10 to promote donning/doffing shoes  Sitting bilateral lateral roll outs x 10 to promote reaching to the side  Biking x 5' intensity 2 to promote endurance with walking    Gary received the following supervised modalities  after being cleared for contradictions: IFC Electrical Stimulation:  Gary received IFC Electrical Stimulation for pain control applied to the low back. Pt received stimulation at 100 % scan at a frequency of 21 for 15 minutes. Gary tolderated treatment well without any adverse effects.      Gary received hot pack for 15 minutes to low back.    Home Exercises Provided and Patient Education Provided     Education provided: home exercise program     Written Home Exercises Provided: Patient instructed to cont prior HEP.  Exercises were reviewed and Gary was able to demonstrate them prior to the end of the session.  Gary demonstrated good  understanding of the education provided.     See EMR under Patient Instructions for exercises provided prior visit.    Assessment   Pt voices great improvement with last injection   Gary Is progressing well towards his goals.   Pt prognosis is Good.     Pt will continue to benefit from skilled outpatient physical therapy to address the deficits listed in the problem list box on initial evaluation, provide pt/family education and to maximize pt's level of independence in the home and community environment.     Pt's spiritual, cultural and educational needs considered and pt agreeable to plan of care and goals.     Anticipated barriers to physical therapy: compliance with home exercise program     Goals:  Short Term Goals: 4  weeks   Pt will be independent with home ex program   Decrease worse pain to 8/10   Be able to increase all lower extremity strength to 4/5   Increase bilateral hip flexion to 120 degrees      Long Term Goals: 8  weeks   Be able to tolerate 20 minutes of cardio   Be able to return to yard work with pain less than 4/10   Be able to sleep all night without awaking with pain   Increase mmt to 5/5 through out lower extremities    Plan   Plan of care Certification: 12/3/2024 to 2/3/2025 .     Outpatient Physical Therapy 2 times weekly for 8 weeks to include the  following interventions: Electrical Stimulation ifc , Manual Therapy, Neuromuscular Re-ed, Patient Education, and Therapeutic Activities.      Plan of care has been reestablished with Sandrita Solo, PT  1/9/2025

## 2025-01-14 ENCOUNTER — CLINICAL SUPPORT (OUTPATIENT)
Dept: REHABILITATION | Facility: HOSPITAL | Age: 55
End: 2025-01-14
Payer: MEDICARE

## 2025-01-14 DIAGNOSIS — M25.69 DECREASED RANGE OF MOTION OF TRUNK AND BACK: Primary | ICD-10-CM

## 2025-01-14 DIAGNOSIS — M54.42 CHRONIC BILATERAL LOW BACK PAIN WITH BILATERAL SCIATICA: ICD-10-CM

## 2025-01-14 DIAGNOSIS — M54.41 CHRONIC BILATERAL LOW BACK PAIN WITH BILATERAL SCIATICA: ICD-10-CM

## 2025-01-14 DIAGNOSIS — G89.29 CHRONIC BILATERAL LOW BACK PAIN WITH BILATERAL SCIATICA: ICD-10-CM

## 2025-01-14 PROCEDURE — 97530 THERAPEUTIC ACTIVITIES: CPT | Mod: PN,CQ

## 2025-01-14 PROCEDURE — 97112 NEUROMUSCULAR REEDUCATION: CPT | Mod: PN,CQ

## 2025-01-14 PROCEDURE — 97014 ELECTRIC STIMULATION THERAPY: CPT | Mod: PN,CQ

## 2025-01-14 NOTE — PROGRESS NOTES
Physical Therapy Treatment Note     Name: Gary Mai [facility]  Clinic Number: [id number]    Therapy Diagnosis:   Encounter Diagnoses   Name Primary?    Decreased range of motion of trunk and back Yes    Chronic bilateral low back pain with bilateral sciatica      Physician: Liza Lowe, YAQUELIN    Visit Date: 1/14/2025  Physician Orders: PT Eval and Treat    Medical Diagnosis from Referral: low back pain m54.42 m54.41 with bilateral sciatica   Evaluation Date: 12/3/2024  Authorization Period Expiration: 2/3/2025   Plan of Care Expiration: humana  Visit # / Visits authorized: 12/ 16  PTA Visit #: 1    Time In: 1100  Time Out: 1145  Total Billable Time: 45 minutes    Precautions: Standard      Subjective     Pt reports: I'm doing ok today, been consistent with taking muscle relaxers.  He was compliant with home exercise program.  Response to previous treatment: soreness  Functional change: ongoing    Pain: 1/10  Location: bilateral back      Objective     Range of motion   Hip flexion   right  100     left  110    Gary participated in neuromuscular re-education activities to improve: Balance, Coordination, and Posture for 8 minutes. The following activities were included:  Slant board x 2'   Bilateral hamstrings stretch on step x 5  Bilateral ITB stretch x 5       Gary participated in dynamic functional therapeutic activities to improve functional performance for 22 minutes, including:  Double support squats total gym x 20 to simulate bending and squatting  Double support calf raises total gym x 20 to simulate reaching on tip toes  Sitting swissball trunk roll outs x 10 to promote donning/doffing shoes  Sitting bilateral lateral roll outs x 10 to promote reaching to the side  Biking x 5' intensity 2 to promote endurance with walking  Double support leg presses 20 x 85# to simulate bending and squatting  Double support calf presses 20 x 85# to simulate reaching on tip toes    Gary received the following supervised  modalities after being cleared for contradictions: IFC Electrical Stimulation:  Gary received IFC Electrical Stimulation for pain control applied to the low back. Pt received stimulation at 100 % scan at a frequency of 21 for 15 minutes. Gary tolderated treatment well without any adverse effects.      Gary received hot pack for 15 minutes to low back.    Home Exercises Provided and Patient Education Provided     Education provided: home exercise program     Written Home Exercises Provided: Patient instructed to cont prior HEP.  Exercises were reviewed and Gary was able to demonstrate them prior to the end of the session.  Gary demonstrated good  understanding of the education provided.     See EMR under Patient Instructions for exercises provided prior visit.    Assessment   Pt progressing with lower extremity strengthening, able to perform leg presses with good effort. Patient voicing decreased pain 0/10 after treatment.  Gary Is progressing well towards his goals.   Pt prognosis is Good.     Pt will continue to benefit from skilled outpatient physical therapy to address the deficits listed in the problem list box on initial evaluation, provide pt/family education and to maximize pt's level of independence in the home and community environment.     Pt's spiritual, cultural and educational needs considered and pt agreeable to plan of care and goals.     Anticipated barriers to physical therapy: compliance with home exercise program     Goals:  Short Term Goals: 4  weeks   Pt will be independent with home ex program   Decrease worse pain to 8/10   Be able to increase all lower extremity strength to 4/5   Increase bilateral hip flexion to 120 degrees      Long Term Goals: 8  weeks   Be able to tolerate 20 minutes of cardio   Be able to return to yard work with pain less than 4/10   Be able to sleep all night without awaking with pain   Increase mmt to 5/5 through out lower extremities    Plan   Plan of care  Certification: 12/3/2024 to 2/3/2025 .     Outpatient Physical Therapy 2 times weekly for 8 weeks to include the following interventions: Electrical Stimulation ifc , Manual Therapy, Neuromuscular Re-ed, Patient Education, and Therapeutic Activities.      Plan of care has been reestablished with Sandrita Colon, PTA  1/14/2025

## 2025-01-16 ENCOUNTER — CLINICAL SUPPORT (OUTPATIENT)
Dept: REHABILITATION | Facility: HOSPITAL | Age: 55
End: 2025-01-16
Payer: MEDICARE

## 2025-01-16 DIAGNOSIS — M25.69 DECREASED RANGE OF MOTION OF TRUNK AND BACK: Primary | ICD-10-CM

## 2025-01-16 PROCEDURE — 97112 NEUROMUSCULAR REEDUCATION: CPT | Mod: PN

## 2025-01-16 PROCEDURE — 97530 THERAPEUTIC ACTIVITIES: CPT | Mod: PN

## 2025-01-16 PROCEDURE — 97014 ELECTRIC STIMULATION THERAPY: CPT | Mod: PN

## 2025-01-16 NOTE — PROGRESS NOTES
Physical Therapy Treatment Note     Name: Gary Mai [facility]  Clinic Number: [id number]    Therapy Diagnosis:   Encounter Diagnosis   Name Primary?    Decreased range of motion of trunk and back Yes     Physician: Liza Lowe FNP    Visit Date: 1/16/2025  Physician Orders: PT Eval and Treat    Medical Diagnosis from Referral: low back pain m54.42 m54.41 with bilateral sciatica   Evaluation Date: 12/3/2024  Authorization Period Expiration: 2/3/2025   Plan of Care Expiration: humana  Visit # / Visits authorized: 13/ 16  PTA Visit #:     Time In: 1100  Time Out: 1145  Total Billable Time: 45 minutes    Precautions: Standard      Subjective     Pt reports: really sore this morning   He was compliant with home exercise program.  Response to previous treatment: soreness  Functional change: ongoing    Pain: 4/ 10  Location: bilateral back      Objective     Range of motion   Hip flexion   right  100     left  110    Gary participated in neuromuscular re-education activities to improve: Balance, Coordination, and Posture for 8 minutes. The following activities were included:  Slant board x 2'   Bilateral hamstrings stretch on step x 5  Bilateral ITB stretch x 5       Gary participated in dynamic functional therapeutic activities to improve functional performance for 22 minutes, including:  Double support squats total gym x 20 to simulate bending and squatting  Double support calf raises total gym x 20 to simulate reaching on tip toes  Sitting swissball trunk roll outs x 10 to promote donning/doffing shoes  Sitting bilateral lateral roll outs x 10 to promote reaching to the side  Biking x 5' intensity 2 to promote endurance with walking  Double support leg presses 20 x 85# to simulate bending and squatting  Double support calf presses 20 x 85# to simulate reaching on tip toes    Gary received the following supervised modalities after being cleared for contradictions: IFC Electrical Stimulation:  Gary received IFC  Electrical Stimulation for pain control applied to the low back. Pt received stimulation at 100 % scan at a frequency of 21 for 15 minutes. Gary tolderated treatment well without any adverse effects.      Gary received hot pack for 15 minutes to low back.    Home Exercises Provided and Patient Education Provided     Education provided: home exercise program     Written Home Exercises Provided: Patient instructed to cont prior HEP.  Exercises were reviewed and Gary was able to demonstrate them prior to the end of the session.  Gary demonstrated good  understanding of the education provided.     See EMR under Patient Instructions for exercises provided prior visit.    Assessment    Pt voices relief post tx 1/10  Gary Is progressing well towards his goals.   Pt prognosis is Good.     Pt will continue to benefit from skilled outpatient physical therapy to address the deficits listed in the problem list box on initial evaluation, provide pt/family education and to maximize pt's level of independence in the home and community environment.     Pt's spiritual, cultural and educational needs considered and pt agreeable to plan of care and goals.     Anticipated barriers to physical therapy: compliance with home exercise program     Goals:  Short Term Goals: 4  weeks   Pt will be independent with home ex program   Decrease worse pain to 8/10   Be able to increase all lower extremity strength to 4/5   Increase bilateral hip flexion to 120 degrees      Long Term Goals: 8  weeks   Be able to tolerate 20 minutes of cardio   Be able to return to yard work with pain less than 4/10   Be able to sleep all night without awaking with pain   Increase mmt to 5/5 through out lower extremities    Plan   Plan of care Certification: 12/3/2024 to 2/3/2025 .     Outpatient Physical Therapy 2 times weekly for 8 weeks to include the following interventions: Electrical Stimulation ifc , Manual Therapy, Neuromuscular Re-ed, Patient Education,  and Therapeutic Activities.      Plan of care has been reestablished with Sandrita Solo, PT  1/16/2025

## 2025-01-21 ENCOUNTER — CLINICAL SUPPORT (OUTPATIENT)
Dept: REHABILITATION | Facility: HOSPITAL | Age: 55
End: 2025-01-21
Payer: MEDICARE

## 2025-01-21 DIAGNOSIS — M54.42 CHRONIC BILATERAL LOW BACK PAIN WITH BILATERAL SCIATICA: ICD-10-CM

## 2025-01-21 DIAGNOSIS — G89.29 CHRONIC BILATERAL LOW BACK PAIN WITH BILATERAL SCIATICA: ICD-10-CM

## 2025-01-21 DIAGNOSIS — M25.69 DECREASED RANGE OF MOTION OF TRUNK AND BACK: Primary | ICD-10-CM

## 2025-01-21 DIAGNOSIS — M54.41 CHRONIC BILATERAL LOW BACK PAIN WITH BILATERAL SCIATICA: ICD-10-CM

## 2025-01-21 PROCEDURE — 97530 THERAPEUTIC ACTIVITIES: CPT | Mod: PN,CQ

## 2025-01-21 PROCEDURE — 97014 ELECTRIC STIMULATION THERAPY: CPT | Mod: PN,CQ

## 2025-01-21 PROCEDURE — 97112 NEUROMUSCULAR REEDUCATION: CPT | Mod: PN,CQ

## 2025-01-21 NOTE — PROGRESS NOTES
Physical Therapy Treatment Note     Name: Gary Mai [facility]  Clinic Number: [id number]    Therapy Diagnosis:   Encounter Diagnoses   Name Primary?    Decreased range of motion of trunk and back Yes    Chronic bilateral low back pain with bilateral sciatica      Physician: Liza Lowe, YAQUELIN    Visit Date: 1/21/2025  Physician Orders: PT Eval and Treat    Medical Diagnosis from Referral: low back pain m54.42 m54.41 with bilateral sciatica   Evaluation Date: 12/3/2024  Authorization Period Expiration: 2/3/2025   Plan of Care Expiration: humana  Visit # / Visits authorized: 14/ 16  PTA Visit #: 1    Time In: 1100  Time Out: 1145  Total Billable Time: 45 minutes    Precautions: Standard      Subjective     Pt reports: I had nerve pain in thigh yesterday, felt like needles. It's a little better this am, at least I can touch my thigh. I see go to pain treatment this week.  He was compliant with home exercise program.  Response to previous treatment: soreness  Functional change: ongoing    Pain: 2/ 10  Location: bilateral back      Objective     Range of motion   Hip flexion   right  100     left  110    Gary participated in neuromuscular re-education activities to improve: Balance, Coordination, and Posture for 10 minutes. The following activities were included:  Slant board x 2'   Bilateral hamstrings stretch on step x 5  Bilateral ITB stretch x 5   Left figure 4 hip stretch x 5    Gary participated in dynamic functional therapeutic activities to improve functional performance for 20 minutes, including:  Double support squats total gym x 20 to simulate bending and squatting  Double support calf raises total gym x 20 to simulate reaching on tip toes  Sitting swissball trunk roll outs x 10 to promote donning/doffing shoes  Sitting bilateral lateral roll outs x 10 to promote reaching to the side  Biking x 5' intensity 2 to promote endurance with walking  Double support leg presses 20 x 100# to simulate bending and  squatting  Double support calf presses 20 x 100# to simulate reaching on tip toes    Gary received the following supervised modalities after being cleared for contradictions: IFC Electrical Stimulation:  Gary received IFC Electrical Stimulation for pain control applied to the low back. Pt received stimulation at 100 % scan at a frequency of 21 for 15 minutes. Gary tolderated treatment well without any adverse effects.      Gary received hot pack for 15 minutes to low back.    Home Exercises Provided and Patient Education Provided     Education provided: home exercise program     Written Home Exercises Provided: Patient instructed to cont prior HEP.  Exercises were reviewed and Gary was able to demonstrate them prior to the end of the session.  Gary demonstrated good  understanding of the education provided.     See EMR under Patient Instructions for exercises provided prior visit.    Assessment    Pt able to increase weight with leg presses improving strength.   Gary Is progressing well towards his goals.   Pt prognosis is Good.     Pt will continue to benefit from skilled outpatient physical therapy to address the deficits listed in the problem list box on initial evaluation, provide pt/family education and to maximize pt's level of independence in the home and community environment.     Pt's spiritual, cultural and educational needs considered and pt agreeable to plan of care and goals.     Anticipated barriers to physical therapy: compliance with home exercise program     Goals:  Short Term Goals: 4  weeks   Pt will be independent with home ex program   Decrease worse pain to 8/10   Be able to increase all lower extremity strength to 4/5   Increase bilateral hip flexion to 120 degrees      Long Term Goals: 8  weeks   Be able to tolerate 20 minutes of cardio   Be able to return to yard work with pain less than 4/10   Be able to sleep all night without awaking with pain   Increase mmt to 5/5 through out  lower extremities    Plan   Plan of care Certification: 12/3/2024 to 2/3/2025 .     Outpatient Physical Therapy 2 times weekly for 8 weeks to include the following interventions: Electrical Stimulation ifc , Manual Therapy, Neuromuscular Re-ed, Patient Education, and Therapeutic Activities.      Plan of care has been reestablished with Sandrita Colon, PTA  1/21/2025

## 2025-01-23 ENCOUNTER — CLINICAL SUPPORT (OUTPATIENT)
Dept: REHABILITATION | Facility: HOSPITAL | Age: 55
End: 2025-01-23
Payer: MEDICARE

## 2025-01-23 DIAGNOSIS — M54.42 CHRONIC BILATERAL LOW BACK PAIN WITH BILATERAL SCIATICA: ICD-10-CM

## 2025-01-23 DIAGNOSIS — M54.41 CHRONIC BILATERAL LOW BACK PAIN WITH BILATERAL SCIATICA: ICD-10-CM

## 2025-01-23 DIAGNOSIS — M25.69 DECREASED RANGE OF MOTION OF TRUNK AND BACK: Primary | ICD-10-CM

## 2025-01-23 DIAGNOSIS — G89.29 CHRONIC BILATERAL LOW BACK PAIN WITH BILATERAL SCIATICA: ICD-10-CM

## 2025-01-23 PROCEDURE — 97530 THERAPEUTIC ACTIVITIES: CPT | Mod: PN

## 2025-01-23 PROCEDURE — 97014 ELECTRIC STIMULATION THERAPY: CPT | Mod: PN

## 2025-01-23 PROCEDURE — 97112 NEUROMUSCULAR REEDUCATION: CPT | Mod: PN

## 2025-01-23 NOTE — PROGRESS NOTES
Physical Therapy Treatment Note     Name: Gary Mai [facility]  Clinic Number: [id number]    Therapy Diagnosis:   Encounter Diagnoses   Name Primary?    Decreased range of motion of trunk and back Yes    Chronic bilateral low back pain with bilateral sciatica      Physician: Liza Lowe FNP    Visit Date: 1/23/2025  Physician Orders: PT Eval and Treat    Medical Diagnosis from Referral: low back pain m54.42 m54.41 with bilateral sciatica   Evaluation Date: 12/3/2024  Authorization Period Expiration: 2/3/2025   Plan of Care Expiration: humana  Visit # / Visits authorized: 15/ 16  PTA Visit #:     Time In: 1100  Time Out: 1145  Total Billable Time: 45 minutes    Precautions: Standard      Subjective     Pt reports been feeling a lot better , got a good dr report     He was compliant with home exercise program.  Response to previous treatment: soreness  Functional change: ongoing    Pain: 2/ 10  Location: bilateral back      Objective     Range of motion   Hip flexion   right  105   left  110    Gary participated in neuromuscular re-education activities to improve: Balance, Coordination, and Posture for 10 minutes. The following activities were included:  Slant board x 2'   Bilateral hamstrings stretch on step x 5  Bilateral ITB stretch x 5   Left figure 4 hip stretch x 5    Gary participated in dynamic functional therapeutic activities to improve functional performance for 20 minutes, including:  Double support squats total gym x 20 to simulate bending and squatting  Double support calf raises total gym x 20 to simulate reaching on tip toes  Sitting swissball trunk roll outs x 10 to promote donning/doffing shoes  Sitting bilateral lateral roll outs x 10 to promote reaching to the side  Biking x 5' intensity 2 to promote endurance with walking  Double support leg presses 20 x 100# to simulate bending and squatting  Double support calf presses 20 x 100# to simulate reaching on tip toes    Gary received the  following supervised modalities after being cleared for contradictions: IFC Electrical Stimulation:  Gary received IFC Electrical Stimulation for pain control applied to the low back. Pt received stimulation at 100 % scan at a frequency of 21 for 15 minutes. Gary tolderated treatment well without any adverse effects.      Gary received hot pack for 15 minutes to low back.    Home Exercises Provided and Patient Education Provided     Education provided: home exercise program     Written Home Exercises Provided: Patient instructed to cont prior HEP.  Exercises were reviewed and Gary was able to demonstrate them prior to the end of the session.  Gary demonstrated good  understanding of the education provided.     See EMR under Patient Instructions for exercises provided prior visit.    Assessment   Pt voices more confidence with less pain   Gary Is progressing well towards his goals.   Pt prognosis is Good.     Pt will continue to benefit from skilled outpatient physical therapy to address the deficits listed in the problem list box on initial evaluation, provide pt/family education and to maximize pt's level of independence in the home and community environment.     Pt's spiritual, cultural and educational needs considered and pt agreeable to plan of care and goals.     Anticipated barriers to physical therapy: compliance with home exercise program     Goals:  Short Term Goals: 4  weeks   Pt will be independent with home ex program   Decrease worse pain to 8/10   Be able to increase all lower extremity strength to 4/5   Increase bilateral hip flexion to 120 degrees      Long Term Goals: 8  weeks   Be able to tolerate 20 minutes of cardio   Be able to return to yard work with pain less than 4/10   Be able to sleep all night without awaking with pain   Increase mmt to 5/5 through out lower extremities    Plan   Plan of care Certification: 12/3/2024 to 2/3/2025 .     Outpatient Physical Therapy 2 times weekly for 8  weeks to include the following interventions: Electrical Stimulation ifc , Manual Therapy, Neuromuscular Re-ed, Patient Education, and Therapeutic Activities.      Plan of care has been reestablished with Sandrita Solo, PT  1/23/2025

## 2025-01-29 ENCOUNTER — CLINICAL SUPPORT (OUTPATIENT)
Dept: REHABILITATION | Facility: HOSPITAL | Age: 55
End: 2025-01-29
Payer: MEDICARE

## 2025-01-29 DIAGNOSIS — M54.41 CHRONIC BILATERAL LOW BACK PAIN WITH BILATERAL SCIATICA: ICD-10-CM

## 2025-01-29 DIAGNOSIS — G89.29 CHRONIC BILATERAL LOW BACK PAIN WITH BILATERAL SCIATICA: ICD-10-CM

## 2025-01-29 DIAGNOSIS — M54.42 CHRONIC BILATERAL LOW BACK PAIN WITH BILATERAL SCIATICA: ICD-10-CM

## 2025-01-29 DIAGNOSIS — M25.69 DECREASED RANGE OF MOTION OF TRUNK AND BACK: Primary | ICD-10-CM

## 2025-01-29 PROCEDURE — 97112 NEUROMUSCULAR REEDUCATION: CPT | Mod: PN,CQ

## 2025-01-29 PROCEDURE — 97530 THERAPEUTIC ACTIVITIES: CPT | Mod: PN,CQ

## 2025-01-29 NOTE — PROGRESS NOTES
Physical Therapy Treatment Note      Name: Gary Mai [facility] [place]  Clinic Number: [id number]     Therapy Diagnosis:        Encounter Diagnoses   Name Primary?    Decreased range of motion of trunk and back Yes    Chronic bilateral low back pain with bilateral sciatica        Physician: Liza Lowe, YAQUELIN     Visit Date: 1/29/2025  Physician Orders: PT Eval and Treat    Medical Diagnosis from Referral: low back pain m54.42 m54.41 with bilateral sciatica   Evaluation Date: 12/3/2024  Authorization Period Expiration: 2/3/2025   Plan of Care Expiration: humana  Visit # / Visits authorized: 16/ 16  PTA Visit #: 1     Time In: 1100  Time Out: 1138  Total Billable Time: 45 minutes     Precautions: Standard        Subjective      Pt reports I started a new med last week and feeling much better.  He was compliant with home exercise program.  Response to previous treatment: soreness  Functional change: ongoing     Pain: 0/ 10  Location: bilateral back       Objective      Range of motion   Hip flexion   right  115   left  112     Gary participated in neuromuscular re-education activities to improve: Balance, Coordination, and Posture for 12 minutes. The following activities were included:  Slant board x 2'   Bilateral hamstrings stretch on step x 5  Bilateral ITB stretch x 5   Left figure 4 hip stretch x 5     Gary participated in dynamic functional therapeutic activities to improve functional performance for 26 minutes, including:  Double support squats total gym x 20 to simulate bending and squatting  Double support calf raises total gym x 20 to simulate reaching on tip toes  Sitting swissball trunk roll outs x 10 to promote donning/doffing shoes  Sitting bilateral lateral roll outs x 10 to promote reaching to the side  Biking x 5' intensity 2 to promote endurance with walking  Double support leg presses 20 x 100# to simulate bending and squatting  Double support calf presses 20 x 100# to simulate reaching on tip toes      Home Exercises Provided and Patient Education Provided      Education provided: home exercise program      Written Home Exercises Provided: Patient instructed to cont prior HEP.  Exercises were reviewed and Gary was able to demonstrate them prior to the end of the session.  Gary demonstrated good  understanding of the education provided.      See EMR under Patient Instructions for exercises provided prior visit.     Assessment   Patient instructed to be consistent with home exercise program, has progressed well and met all but one goal outlined in evaluation.  Gary Is progressing well towards his goals.   Pt prognosis is Good.      Pt will continue to benefit from skilled outpatient physical therapy to address the deficits listed in the problem list box on initial evaluation, provide pt/family education and to maximize pt's level of independence in the home and community environment.      Pt's spiritual, cultural and educational needs considered and pt agreeable to plan of care and goals.     Anticipated barriers to physical therapy: compliance with home exercise program      Goals:  Short Term Goals: 4  weeks   Pt will be independent with home ex program -met  Decrease worse pain to 8/10- met  Be able to increase all lower extremity strength to 4/5   Increase bilateral hip flexion to 120 degrees -     Long Term Goals: 8  weeks   Be able to tolerate 20 minutes of cardio -met  Be able to return to yard work with pain less than 4/10 -met  Be able to sleep all night without awaking with pain-met   Increase mmt to 5/5 through out lower extremities-met     Plan     Outpatient Therapy Discharge Summary     Name: Gary Mai [facility]  Clinic Number: [id number]    Therapy Diagnosis:   Encounter Diagnoses   Name Primary?    Decreased range of motion of trunk and back Yes    Chronic bilateral low back pain with bilateral sciatica      Physician: Liza Lowe FNP         Assessment    Goals:  Pt met goals     Discharge  reason: Patient has met all of his/her goals    Plan   This patient is discharged from Physical Therapy.   González Solo, PT

## 2025-01-29 NOTE — PROGRESS NOTES
Physical Therapy Treatment Note     Name: Gary Mai [facility]  Clinic Number: [id number]    Therapy Diagnosis:   Encounter Diagnoses   Name Primary?    Decreased range of motion of trunk and back Yes    Chronic bilateral low back pain with bilateral sciatica      Physician: Liza Lowe, YAQUELIN    Visit Date: 1/29/2025  Physician Orders: PT Eval and Treat    Medical Diagnosis from Referral: low back pain m54.42 m54.41 with bilateral sciatica   Evaluation Date: 12/3/2024  Authorization Period Expiration: 2/3/2025   Plan of Care Expiration: humana  Visit # / Visits authorized: 16/ 16  PTA Visit #: 1    Time In: 1100  Time Out: 1138  Total Billable Time: 45 minutes    Precautions: Standard      Subjective     Pt reports I started a new med last week and feeling much better.  He was compliant with home exercise program.  Response to previous treatment: soreness  Functional change: ongoing    Pain: 0/ 10  Location: bilateral back      Objective     Range of motion   Hip flexion   right  115   left  112    Gary participated in neuromuscular re-education activities to improve: Balance, Coordination, and Posture for 12 minutes. The following activities were included:  Slant board x 2'   Bilateral hamstrings stretch on step x 5  Bilateral ITB stretch x 5   Left figure 4 hip stretch x 5    Gary participated in dynamic functional therapeutic activities to improve functional performance for 26 minutes, including:  Double support squats total gym x 20 to simulate bending and squatting  Double support calf raises total gym x 20 to simulate reaching on tip toes  Sitting swissball trunk roll outs x 10 to promote donning/doffing shoes  Sitting bilateral lateral roll outs x 10 to promote reaching to the side  Biking x 5' intensity 2 to promote endurance with walking  Double support leg presses 20 x 100# to simulate bending and squatting  Double support calf presses 20 x 100# to simulate reaching on tip toes    Home Exercises  Provided and Patient Education Provided     Education provided: home exercise program     Written Home Exercises Provided: Patient instructed to cont prior HEP.  Exercises were reviewed and Gary was able to demonstrate them prior to the end of the session.  Gary demonstrated good  understanding of the education provided.     See EMR under Patient Instructions for exercises provided prior visit.    Assessment   Patient instructed to be consistent with home exercise program, has progressed well and met all but one goal outlined in evaluation.  Gary Is progressing well towards his goals.   Pt prognosis is Good.     Pt will continue to benefit from skilled outpatient physical therapy to address the deficits listed in the problem list box on initial evaluation, provide pt/family education and to maximize pt's level of independence in the home and community environment.     Pt's spiritual, cultural and educational needs considered and pt agreeable to plan of care and goals.     Anticipated barriers to physical therapy: compliance with home exercise program     Goals:  Short Term Goals: 4  weeks   Pt will be independent with home ex program -met  Decrease worse pain to 8/10- met  Be able to increase all lower extremity strength to 4/5   Increase bilateral hip flexion to 120 degrees -     Long Term Goals: 8  weeks   Be able to tolerate 20 minutes of cardio -met  Be able to return to yard work with pain less than 4/10 -met  Be able to sleep all night without awaking with pain-met   Increase mmt to 5/5 through out lower extremities-met    Plan   Will d/c to home exercise program, see physical therapist d/c note.    [place] Colon, PTA  1/29/2025

## 2025-01-30 ENCOUNTER — OFFICE VISIT (OUTPATIENT)
Dept: FAMILY MEDICINE | Facility: CLINIC | Age: 55
End: 2025-01-30
Payer: MEDICARE

## 2025-01-30 VITALS
SYSTOLIC BLOOD PRESSURE: 139 MMHG | HEIGHT: 73 IN | BODY MASS INDEX: 28.94 KG/M2 | HEART RATE: 73 BPM | RESPIRATION RATE: 18 BRPM | WEIGHT: 218.38 LBS | OXYGEN SATURATION: 99 % | TEMPERATURE: 98 F | DIASTOLIC BLOOD PRESSURE: 87 MMHG

## 2025-01-30 DIAGNOSIS — J01.00 ACUTE MAXILLARY SINUSITIS, RECURRENCE NOT SPECIFIED: Primary | ICD-10-CM

## 2025-01-30 PROBLEM — R05.9 COUGH: Status: RESOLVED | Noted: 2024-02-14 | Resolved: 2025-01-30

## 2025-01-30 PROCEDURE — 3079F DIAST BP 80-89 MM HG: CPT | Mod: ,,,

## 2025-01-30 PROCEDURE — 96372 THER/PROPH/DIAG INJ SC/IM: CPT | Mod: ,,,

## 2025-01-30 PROCEDURE — 1159F MED LIST DOCD IN RCRD: CPT | Mod: ,,,

## 2025-01-30 PROCEDURE — 3075F SYST BP GE 130 - 139MM HG: CPT | Mod: ,,,

## 2025-01-30 PROCEDURE — 99213 OFFICE O/P EST LOW 20 MIN: CPT | Mod: 25,,,

## 2025-01-30 PROCEDURE — 3008F BODY MASS INDEX DOCD: CPT | Mod: ,,,

## 2025-01-30 PROCEDURE — 1160F RVW MEDS BY RX/DR IN RCRD: CPT | Mod: ,,,

## 2025-01-30 RX ORDER — DEXAMETHASONE SODIUM PHOSPHATE 4 MG/ML
4 INJECTION, SOLUTION INTRA-ARTICULAR; INTRALESIONAL; INTRAMUSCULAR; INTRAVENOUS; SOFT TISSUE
Status: COMPLETED | OUTPATIENT
Start: 2025-01-30 | End: 2025-01-30

## 2025-01-30 RX ORDER — AMOXICILLIN AND CLAVULANATE POTASSIUM 875; 125 MG/1; MG/1
1 TABLET, FILM COATED ORAL EVERY 12 HOURS
Qty: 14 TABLET | Refills: 0 | Status: SHIPPED | OUTPATIENT
Start: 2025-01-30 | End: 2025-02-06

## 2025-01-30 RX ORDER — CEFTRIAXONE 1 G/1
1 INJECTION, POWDER, FOR SOLUTION INTRAMUSCULAR; INTRAVENOUS
Status: COMPLETED | OUTPATIENT
Start: 2025-01-30 | End: 2025-01-30

## 2025-01-30 RX ORDER — CELECOXIB 200 MG/1
200 CAPSULE ORAL
COMMUNITY
Start: 2025-01-23 | End: 2025-05-23

## 2025-01-30 RX ORDER — FLUTICASONE PROPIONATE 50 MCG
2 SPRAY, SUSPENSION (ML) NASAL DAILY
Qty: 11.1 ML | Refills: 0 | Status: SHIPPED | OUTPATIENT
Start: 2025-01-30

## 2025-01-30 RX ADMIN — CEFTRIAXONE 1 G: 1 INJECTION, POWDER, FOR SOLUTION INTRAMUSCULAR; INTRAVENOUS at 08:01

## 2025-01-30 RX ADMIN — DEXAMETHASONE SODIUM PHOSPHATE 4 MG: 4 INJECTION, SOLUTION INTRA-ARTICULAR; INTRALESIONAL; INTRAMUSCULAR; INTRAVENOUS; SOFT TISSUE at 08:01

## 2025-01-30 NOTE — PROGRESS NOTES
Subjective     Patient ID: Gary Moscoso is a 54 y.o. male.    Chief Complaint: Sinus Problem (Patient reports nasal congestion and drainage with itchy watery eyes and nose x3 weeks. Reports taking over the counter medications including allergy meds and robitussin. Meds will clear up the symptoms and as soon as he stops taking them symptoms come right back. OTC meds have been elevating his blood pressure patient states. Declined all POCT testing today.)    Sinus Problem      Review of Systems       Objective     Physical Exam  Vitals and nursing note reviewed.   Constitutional:       Appearance: Normal appearance.   HENT:      Head: Normocephalic.      Right Ear: Ear canal and external ear normal. Tympanic membrane is bulging.      Left Ear: Ear canal and external ear normal. Tympanic membrane is bulging.      Nose: Congestion present.      Right Sinus: Maxillary sinus tenderness present.      Left Sinus: Maxillary sinus tenderness present.      Mouth/Throat:      Mouth: Mucous membranes are moist.      Pharynx: Oropharynx is clear.   Eyes:      Pupils: Pupils are equal, round, and reactive to light.   Cardiovascular:      Rate and Rhythm: Normal rate and regular rhythm.      Heart sounds: Normal heart sounds.   Pulmonary:      Effort: Pulmonary effort is normal.      Breath sounds: Normal breath sounds.   Musculoskeletal:         General: Normal range of motion.      Cervical back: Normal range of motion.   Skin:     General: Skin is warm.      Capillary Refill: Capillary refill takes less than 2 seconds.   Neurological:      General: No focal deficit present.      Mental Status: He is alert.   Psychiatric:         Mood and Affect: Mood normal.            Assessment and Plan     1. Acute maxillary sinusitis, recurrence not specified  -     amoxicillin-clavulanate 875-125mg (AUGMENTIN) 875-125 mg per tablet; Take 1 tablet by mouth every 12 (twelve) hours. for 7 days  Dispense: 14 tablet; Refill: 0  -      fluticasone propionate (FLONASE) 50 mcg/actuation nasal spray; 2 sprays (100 mcg total) by Each Nostril route once daily.  Dispense: 11.1 mL; Refill: 0  -     cefTRIAXone injection 1 g  -     dexAMETHasone injection 4 mg        Pt advised that steroid injection could raise BP and blood sugar. Pt advised to monitor at home, rtc as needed         No follow-ups on file.

## 2025-02-20 ENCOUNTER — OFFICE VISIT (OUTPATIENT)
Dept: FAMILY MEDICINE | Facility: CLINIC | Age: 55
End: 2025-02-20
Payer: MEDICARE

## 2025-02-20 VITALS
DIASTOLIC BLOOD PRESSURE: 80 MMHG | RESPIRATION RATE: 19 BRPM | WEIGHT: 218.81 LBS | OXYGEN SATURATION: 99 % | TEMPERATURE: 97 F | HEART RATE: 65 BPM | HEIGHT: 73 IN | BODY MASS INDEX: 29 KG/M2 | SYSTOLIC BLOOD PRESSURE: 130 MMHG

## 2025-02-20 DIAGNOSIS — J01.41 ACUTE RECURRENT PANSINUSITIS: Primary | ICD-10-CM

## 2025-02-20 DIAGNOSIS — K21.9 GASTROESOPHAGEAL REFLUX DISEASE WITHOUT ESOPHAGITIS: ICD-10-CM

## 2025-02-20 PROCEDURE — 1160F RVW MEDS BY RX/DR IN RCRD: CPT | Mod: ,,, | Performed by: NURSE PRACTITIONER

## 2025-02-20 PROCEDURE — 99213 OFFICE O/P EST LOW 20 MIN: CPT | Mod: 25,,, | Performed by: NURSE PRACTITIONER

## 2025-02-20 PROCEDURE — 3008F BODY MASS INDEX DOCD: CPT | Mod: ,,, | Performed by: NURSE PRACTITIONER

## 2025-02-20 PROCEDURE — 96372 THER/PROPH/DIAG INJ SC/IM: CPT | Mod: ,,, | Performed by: NURSE PRACTITIONER

## 2025-02-20 PROCEDURE — 1159F MED LIST DOCD IN RCRD: CPT | Mod: ,,, | Performed by: NURSE PRACTITIONER

## 2025-02-20 PROCEDURE — 3079F DIAST BP 80-89 MM HG: CPT | Mod: ,,, | Performed by: NURSE PRACTITIONER

## 2025-02-20 PROCEDURE — 3075F SYST BP GE 130 - 139MM HG: CPT | Mod: ,,, | Performed by: NURSE PRACTITIONER

## 2025-02-20 RX ORDER — CEFTRIAXONE 500 MG/1
500 INJECTION, POWDER, FOR SOLUTION INTRAMUSCULAR; INTRAVENOUS ONCE
Qty: 0.5 G | Refills: 0 | Status: SHIPPED | OUTPATIENT
Start: 2025-02-20 | End: 2025-02-20

## 2025-02-20 RX ORDER — METHYLPREDNISOLONE ACETATE 40 MG/ML
40 INJECTION, SUSPENSION INTRA-ARTICULAR; INTRALESIONAL; INTRAMUSCULAR; SOFT TISSUE
Status: COMPLETED | OUTPATIENT
Start: 2025-02-20 | End: 2025-02-20

## 2025-02-20 RX ORDER — HYDROGEN PEROXIDE 3 %
20 SOLUTION, NON-ORAL MISCELLANEOUS
Qty: 90 CAPSULE | Refills: 1 | Status: SHIPPED | OUTPATIENT
Start: 2025-02-20

## 2025-02-20 RX ORDER — AZITHROMYCIN 500 MG/1
500 TABLET, FILM COATED ORAL DAILY
Qty: 5 TABLET | Refills: 0 | Status: SHIPPED | OUTPATIENT
Start: 2025-02-20

## 2025-02-20 RX ORDER — CEFTRIAXONE 500 MG/1
500 INJECTION, POWDER, FOR SOLUTION INTRAMUSCULAR; INTRAVENOUS
Status: COMPLETED | OUTPATIENT
Start: 2025-02-20 | End: 2025-02-20

## 2025-02-20 RX ORDER — CEFTRIAXONE 1 G/1
1 INJECTION, POWDER, FOR SOLUTION INTRAMUSCULAR; INTRAVENOUS
Status: DISCONTINUED | OUTPATIENT
Start: 2025-02-20 | End: 2025-02-20

## 2025-02-20 RX ORDER — CETIRIZINE HYDROCHLORIDE, PSEUDOEPHEDRINE HYDROCHLORIDE 5; 120 MG/1; MG/1
1 TABLET, FILM COATED, EXTENDED RELEASE ORAL 2 TIMES DAILY PRN
Qty: 20 TABLET | Refills: 0 | Status: SHIPPED | OUTPATIENT
Start: 2025-02-20 | End: 2025-03-02

## 2025-02-20 RX ADMIN — CEFTRIAXONE 500 MG: 500 INJECTION, POWDER, FOR SOLUTION INTRAMUSCULAR; INTRAVENOUS at 10:02

## 2025-02-20 RX ADMIN — METHYLPREDNISOLONE ACETATE 40 MG: 40 INJECTION, SUSPENSION INTRA-ARTICULAR; INTRALESIONAL; INTRAMUSCULAR; SOFT TISSUE at 10:02

## 2025-03-02 PROBLEM — J01.41 ACUTE RECURRENT PANSINUSITIS: Status: ACTIVE | Noted: 2023-08-28

## 2025-03-03 NOTE — ASSESSMENT & PLAN NOTE
Rocephin and Depomedrol given IM in clinic.   Zithromax as ordered and Zyrtec D as needed.    Reviewed pathology of current symptoms, medication side effects/risk/benefits/directions on taking medications, and worsening or persistent symptoms that require follow up in next 2-3 days. Saline/steroid nasal sprays, antihistamine use, increase fluid intake, and multivitamin/elderberry/Zinc use were recommended. May take Tylenol or Motrin as needed for pain and/or fever. Patient was instructed to take antibiotic as directed, complete entire course to avoid antibiotic resistance, and take OTC probiotic with antibiotic to prevent GI upset. Patient verbalized understanding of treatment plan and denies any questions

## 2025-03-03 NOTE — PROGRESS NOTES
Brit Obando NP   [facility]  [address]  [place]  [address]      PATIENT NAME: Gary Moscoso  : 1970  DATE: 25  MRN: [id number]      Billing Provider: Brit Obando NP  Level of Service: OK OFFICE/OUTPT VISIT, EST, LEVL III, 20-29 MIN  Patient PCP Information       Provider PCP Type    Brit Obando NP General            Reason for Visit / Chief Complaint: Follow-up (3 month f/u. Patient goes to pain treatment has been taking Celebrex and Robaxin that have been effective in pain treatment. Pt is seeing Urologist Dr. Puga for elevated PSA. ) and Sinus Problem (Patient continues to c/o nasal congestion and sinus pressure headache. He was treated 2025 and reports problem did get better after completing antibiotics but symptoms returned. Declines covid/flu swab. )         History of Present Illness / Problem Focused Workflow     54 year old male presents to clinic for 3 month f/u. Patient goes to pain treatment has been taking Celebrex and Robaxin that have been effective in pain treatment. Pt is seeing Urologist Dr. Puga for elevated PSA.   Sinus Problem: Patient continues to c/o nasal congestion and sinus pressure headache. He was treated 2025 and reports problem did get better after completing antibiotics but symptoms returned. Declines covid/flu swab.             Review of Systems     @Review of Systems   Constitutional:  Negative for activity change, appetite change, fatigue and fever.   HENT:  Positive for nasal congestion, rhinorrhea, sinus pressure/congestion and sore throat. Negative for ear pain.    Eyes:  Negative for pain, redness, visual disturbance and eye dryness.   Respiratory:  Positive for cough. Negative for shortness of breath.    Cardiovascular:  Negative for chest pain and leg swelling.   Gastrointestinal:  Negative for abdominal distention, abdominal pain, constipation and diarrhea.   Endocrine: Negative for cold intolerance,  heat intolerance and polyuria.   Genitourinary:  Negative for bladder incontinence, dysuria, frequency and urgency.   Musculoskeletal:  Negative for arthralgias, gait problem and myalgias.   Integumentary:  Negative for color change, rash and wound.   Allergic/Immunologic: Negative for environmental allergies and food allergies.   Neurological:  Negative for dizziness, weakness, light-headedness and headaches.   Psychiatric/Behavioral:  Negative for behavioral problems and sleep disturbance.        Medical / Social / Family History     Past Medical History:   Diagnosis Date    Aseptic necrosis of head of humerus 09/27/2013    BPH (benign prostatic hyperplasia)     Dr. Kristofer Puga    Cough 02/14/2024    Cyst of epididymis 02/18/2021    Degeneration of lumbar intervertebral disc 03/24/2013    Depression     Encounter for examination for driving license 08/06/2018    GERD (gastroesophageal reflux disease)     Hypertension     Hypokalemia     Lateral epicondylitis, left elbow 06/01/2020    Myocardial infarction 2020    pt reports    Non-diabetic hypoglycemia 01/10/2012    Presence of right artificial hip joint 02/04/2016    Prolapsed cervical intervertebral disc 05/09/2013    without myelopathy       Past Surgical History:   Procedure Laterality Date    ARTHROSCOPY, HIP Left 9/20/2021    Procedure: ARTHROSCOPY, HIP, WITH LABRUM REPAIR;  Surgeon: Charles Yeh MD;  Location: [facility];  Service: Orthopedics;  Laterality: Left;    ARTHROSCOPY, HIP Left 9/20/2021    Procedure: ARTHROSCOPY, HIP, WITH FEMOROPLASTY;  Surgeon: Charles Yeh MD;  Location: [facility];  Service: Orthopedics;  Laterality: Left;    ARTHROSCOPY, HIP Left 9/20/2021    Procedure: ARTHROSCOPY, HIP, WITH ACETABULOPLASTY, WITH REPAIR OF LABRUM IF INDICATED;  Surgeon: Charles Yeh MD;  Location: [facility];  Service: Orthopedics;  Laterality: Left;    BIOPSY, PROSTATE, USING PROSTATE MAPPING N/A 10/5/2023    Procedure: BIOPSY,  PROSTATE, USING PROSTATE MAPPING;  Surgeon: Kristofer Puga MD;  Location: [facility] OR;  Service: Urology;  Laterality: N/A;    CARPAL TUNNEL RELEASE      DIAGNOSTIC LAPAROSCOPY N/A 3/15/2021    Procedure: LAPAROSCOPY, DIAGNOSTIC;  Surgeon: Jay Nolen MD;  Location: [facility] OR;  Service: General;  Laterality: N/A;  1052 FAMILY INFORMED OF SURGERY START  1130 DR NOLEN TALKED TO PATIENT FAMILY    HIP SURGERY      inguinal hernia reparir  03/05/2021    Dr. Robert Nolen    ROBOT-ASSISTED LAPAROSCOPIC REPAIR OF INGUINAL HERNIA USING DA JALEN XI Bilateral 3/15/2021    Procedure: XI ROBOTIC INGUINAL HERNIA REPAIRS WITH MESH;  Surgeon: Jay Nolen MD;  Location: [facility] OR;  Service: General;  Laterality: Bilateral;    SHOULDER SURGERY      VARICOSE VEIN SURGERY         Medications and Allergies     Medications  Outpatient Medications Marked as Taking for the 2/20/25 encounter (Office Visit) with Brit Obando NP   Medication Sig Dispense Refill    ALLERGY RELIEF D12 5-120 mg per tablet Take 1 tablet by mouth 2 (two) times daily as needed for Allergies. 20 tablet 0    amitriptyline (ELAVIL) 50 MG tablet 50 mg nightly as needed.      ascorbic acid, vitamin C, (VITAMIN C) 100 MG tablet Take 100 mg by mouth once daily.      aspirin (ECOTRIN) 81 MG EC tablet Take 81 mg by mouth once daily.      baclofen (LIORESAL) 5 mg Tab tablet Take 1 tablet by mouth 3 (three) times daily.      carvediloL (COREG) 12.5 MG tablet Take 12.5 mg by mouth 2 (two) times daily.      CELEBREX 200 mg capsule Take 200 mg by mouth.      cetirizine (ZYRTEC) 10 MG tablet Take 1 tablet (10 mg total) by mouth once daily. 30 tablet 5    colchicine (COLCRYS) 0.6 mg tablet Take 1 tablet (0.6 mg total) by mouth as needed (gout flare up). 30 tablet 0    cyclobenzaprine (FLEXERIL) 5 MG tablet Take 1 tablet by mouth 3 times daily as needed.      fluticasone propionate (FLONASE) 50 mcg/actuation nasal spray 2 sprays (100 mcg total) by Each  Nostril route once daily. 11.1 mL 0    gabapentin (NEURONTIN) 100 MG capsule Take 1 capsule by mouth every evening.      MAG 64 64 mg TbEC Take 64 mg by mouth 2 (two) times daily.      methocarbamoL (ROBAXIN) 750 MG Tab Take 750 mg by mouth every 8 (eight) hours as needed (pain).      montelukast (SINGULAIR) 10 mg tablet Take 10 mg by mouth every evening.      NIFEdipine (ADALAT CC) 30 MG TbSR Take 30 mg by mouth 2 (two) times a day.      ondansetron (ZOFRAN-ODT) 4 MG TbDL Take 1 tablet (4 mg total) by mouth every 6 (six) hours as needed (nausea). 20 tablet 0    potassium chloride SA (K-DUR,KLOR-CON) 20 MEQ tablet Take 20 mEq by mouth 2 (two) times daily.      tadalafiL (CIALIS) 20 MG Tab Take 20 mg by mouth daily as needed.      tamsulosin (FLOMAX) 0.4 mg Cap Take 1 capsule by mouth every morning.      traMADoL (ULTRAM) 50 mg tablet Take 1 tablet by mouth every 6 (six) hours as needed.      XIIDRA 5 % Dpet Instill 1 drop into both eyes twice a day as directed      [DISCONTINUED] esomeprazole (NEXIUM) 20 MG capsule Take 20 mg by mouth before breakfast.         Allergies  Review of patient's allergies indicates:   Allergen Reactions    Ace inhibitors Swelling    Lisinopril Anaphylaxis    Opioids - morphine analogues Nausea And Vomiting    Codeine Nausea And Vomiting    Mobic [meloxicam] Itching       Physical Examination     Vitals:    02/20/25 0944   BP: 130/80   Pulse: 65   Resp: 19   Temp: 97.4 °F (36.3 °C)     Physical Exam  Vitals and nursing note reviewed.   Constitutional:       Appearance: Normal appearance.   HENT:      Head: Normocephalic.      Right Ear: Tympanic membrane normal.      Left Ear: Tympanic membrane normal.      Nose: Congestion and rhinorrhea present. Rhinorrhea is purulent.      Right Turbinates: Pale.      Left Turbinates: Pale.      Right Sinus: Maxillary sinus tenderness and frontal sinus tenderness present.      Left Sinus: Maxillary sinus tenderness and frontal sinus tenderness present.       Mouth/Throat:      Lips: Pink.      Mouth: Mucous membranes are moist.      Pharynx: Oropharyngeal exudate (clear post nasal drainage.) and posterior oropharyngeal erythema present.   Eyes:      Conjunctiva/sclera: Conjunctivae normal.   Cardiovascular:      Rate and Rhythm: Normal rate and regular rhythm.      Pulses: Normal pulses.      Heart sounds: Normal heart sounds.   Pulmonary:      Effort: Pulmonary effort is normal.      Breath sounds: Normal breath sounds. No wheezing or rhonchi.   Abdominal:      General: Abdomen is flat. Bowel sounds are normal. There is no distension.      Palpations: Abdomen is soft.      Tenderness: There is no abdominal tenderness.   Musculoskeletal:         General: Normal range of motion.      Cervical back: Normal range of motion.   Skin:     General: Skin is warm and dry.      Capillary Refill: Capillary refill takes less than 2 seconds.   Neurological:      General: No focal deficit present.      Mental Status: He is alert and oriented to person, place, and time. Mental status is at baseline.   Psychiatric:         Mood and Affect: Mood normal.         Behavior: Behavior normal.               Lab Results   Component Value Date    WBC 5.08 06/09/2024    HGB 15.0 06/09/2024    HCT 43.8 06/09/2024    MCV 87.3 06/09/2024     06/09/2024        CMP  Sodium   Date Value Ref Range Status   06/09/2024 141 136 - 145 mmol/L Final     Potassium   Date Value Ref Range Status   06/09/2024 4.2 3.5 - 5.1 mmol/L Final     Chloride   Date Value Ref Range Status   06/09/2024 111 (H) 98 - 107 mmol/L Final     CO2   Date Value Ref Range Status   06/09/2024 23 21 - 32 mmol/L Final     Glucose   Date Value Ref Range Status   06/09/2024 97 74 - 106 mg/dL Final     BUN   Date Value Ref Range Status   06/09/2024 15 7 - 18 mg/dL Final     Creatinine   Date Value Ref Range Status   06/09/2024 1.11 0.70 - 1.30 mg/dL Final     Calcium   Date Value Ref Range Status   06/09/2024 9.1 8.5 - 10.1  mg/dL Final     Total Protein   Date Value Ref Range Status   06/09/2024 7.8 6.4 - 8.2 g/dL Final     Albumin   Date Value Ref Range Status   06/09/2024 4.1 3.5 - 5.0 g/dL Final     Bilirubin, Total   Date Value Ref Range Status   06/09/2024 1.2 >0.0 - 1.2 mg/dL Final     Alk Phos   Date Value Ref Range Status   06/09/2024 102 45 - 115 U/L Final     AST   Date Value Ref Range Status   06/09/2024 31 15 - 37 U/L Final     ALT   Date Value Ref Range Status   06/09/2024 32 16 - 61 U/L Final     Anion Gap   Date Value Ref Range Status   06/09/2024 11 7 - 16 mmol/L Final     eGFR   Date Value Ref Range Status   06/09/2024 79 >=60 mL/min/1.73m2 Final     Procedures   Assessment and Plan (including Health Maintenance)   :    Plan:     Problem List Items Addressed This Visit          ENT    Acute recurrent pansinusitis - Primary    Current Assessment & Plan   Rocephin and Depomedrol given IM in clinic.   Zithromax as ordered and Zyrtec D as needed.    Reviewed pathology of current symptoms, medication side effects/risk/benefits/directions on taking medications, and worsening or persistent symptoms that require follow up in next 2-3 days. Saline/steroid nasal sprays, antihistamine use, increase fluid intake, and multivitamin/elderberry/Zinc use were recommended. May take Tylenol or Motrin as needed for pain and/or fever. Patient was instructed to take antibiotic as directed, complete entire course to avoid antibiotic resistance, and take OTC probiotic with antibiotic to prevent GI upset. Patient verbalized understanding of treatment plan and denies any questions         Relevant Medications    azithromycin (ZITHROMAX) 500 MG tablet    cetirizine-pseudoephedrine 5-120 mg Tb12       GI    Gastroesophageal reflux disease without esophagitis    Current Assessment & Plan   Well controlled on Nexium. Continue at current dosage. Follow up in 3 months or as needed.          Relevant Medications    esomeprazole (NEXIUM) 20 MG capsule        Health Maintenance Topics with due status: Not Due       Topic Last Completion Date    TETANUS VACCINE 04/30/2018    Colorectal Cancer Screening 06/24/2021    Lipid Panel 11/25/2024    RSV Vaccine (Age 60+ and Pregnant patients) Not Due       Future Appointments   Date Time Provider Department Center   5/20/2025  9:40 AM Brit Obando NP [facility]   11/13/2025  9:00 AM AWV NURSE [facility]        Health Maintenance Due   Topic Date Due    HIV Screening  Never done    Hemoglobin A1c (Diabetic Prevention Screening)  Never done    Pneumococcal Vaccines (Age 50+) (1 of 1 - PCV) Never done    Shingles Vaccine (2 of 2) 07/28/2022          Signature:  Brit Obando NP  [facility]  [address] [place] [address]  [id number]    Date of encounter: 2/20/25

## 2025-03-21 DIAGNOSIS — J01.41 ACUTE RECURRENT PANSINUSITIS: Primary | ICD-10-CM

## 2025-03-21 RX ORDER — 5-HYDROXYTRYPTOPHAN (5-HTP) 100 MG
1 CAPSULE ORAL 2 TIMES DAILY PRN
Qty: 20 TABLET | Refills: 0 | Status: SHIPPED | OUTPATIENT
Start: 2025-03-21

## 2025-03-24 ENCOUNTER — TELEPHONE (OUTPATIENT)
Dept: FAMILY MEDICINE | Facility: CLINIC | Age: 55
End: 2025-03-24
Payer: MEDICARE

## 2025-03-24 ENCOUNTER — PATIENT MESSAGE (OUTPATIENT)
Dept: FAMILY MEDICINE | Facility: CLINIC | Age: 55
End: 2025-03-24
Payer: MEDICARE

## 2025-03-24 NOTE — TELEPHONE ENCOUNTER
Copied from CRM #[id number]. Topic: General Inquiry - Patient Advice  >> Mar 24, 2025  3:43 PM Med Assistant Alexandra wrote:  Who Called: Raya (wife)    Caller is requesting assistance/information from provider's office.      Preferred Method of Contact: Phone Call  Patient's Preferred Phone Number on File: [phone]   Best Call Back Number, if different:[phone]  Additional Information: wants to talk about disability letter, showing he not able to work due to all his problems

## 2025-03-25 NOTE — TELEPHONE ENCOUNTER
Patient reached out to clinic through patient portal. Gave patient information regarding disability paperwork.

## 2025-04-03 DIAGNOSIS — R97.20 ELEVATED PSA: Primary | ICD-10-CM

## 2025-04-03 RX ORDER — SODIUM CHLORIDE 9 MG/ML
INJECTION, SOLUTION INTRAVENOUS CONTINUOUS
Status: CANCELLED | OUTPATIENT
Start: 2025-04-04

## 2025-04-03 RX ORDER — GENTAMICIN 40 MG/ML
120 INJECTION, SOLUTION INTRAMUSCULAR; INTRAVENOUS ONCE
Status: CANCELLED | OUTPATIENT
Start: 2025-04-04

## 2025-04-03 RX ORDER — DIPHENHYDRAMINE HYDROCHLORIDE 50 MG/ML
25 INJECTION, SOLUTION INTRAMUSCULAR; INTRAVENOUS EVERY 6 HOURS PRN
Status: CANCELLED | OUTPATIENT
Start: 2025-04-04

## 2025-04-03 RX ORDER — ONDANSETRON HYDROCHLORIDE 2 MG/ML
4 INJECTION, SOLUTION INTRAVENOUS EVERY 4 HOURS PRN
Status: CANCELLED | OUTPATIENT
Start: 2025-04-04

## 2025-04-04 ENCOUNTER — ANESTHESIA (OUTPATIENT)
Dept: SURGERY | Facility: HOSPITAL | Age: 55
End: 2025-04-04
Payer: MEDICARE

## 2025-04-04 ENCOUNTER — HOSPITAL ENCOUNTER (OUTPATIENT)
Facility: HOSPITAL | Age: 55
Discharge: HOME | End: 2025-04-04
Attending: UROLOGY | Admitting: UROLOGY
Payer: MEDICARE

## 2025-04-04 ENCOUNTER — ANESTHESIA EVENT (OUTPATIENT)
Dept: SURGERY | Facility: HOSPITAL | Age: 55
End: 2025-04-04
Payer: MEDICARE

## 2025-04-04 VITALS
BODY MASS INDEX: 25.09 KG/M2 | HEART RATE: 49 BPM | OXYGEN SATURATION: 98 % | SYSTOLIC BLOOD PRESSURE: 121 MMHG | DIASTOLIC BLOOD PRESSURE: 68 MMHG | RESPIRATION RATE: 12 BRPM | HEIGHT: 76 IN | WEIGHT: 206 LBS | TEMPERATURE: 98 F

## 2025-04-04 DIAGNOSIS — R97.20 ELEVATED PSA: ICD-10-CM

## 2025-04-04 DIAGNOSIS — R93.89 ABNORMAL MRI: ICD-10-CM

## 2025-04-04 PROCEDURE — 27000716 HC OXISENSOR PROBE, ANY SIZE: Performed by: NURSE ANESTHETIST, CERTIFIED REGISTERED

## 2025-04-04 PROCEDURE — 71000033 HC RECOVERY, INTIAL HOUR: Performed by: UROLOGY

## 2025-04-04 PROCEDURE — 37000009 HC ANESTHESIA EA ADD 15 MINS: Performed by: UROLOGY

## 2025-04-04 PROCEDURE — 37000008 HC ANESTHESIA 1ST 15 MINUTES: Performed by: UROLOGY

## 2025-04-04 PROCEDURE — 63600175 PHARM REV CODE 636 W HCPCS: Performed by: NURSE ANESTHETIST, CERTIFIED REGISTERED

## 2025-04-04 PROCEDURE — 27000510 HC BLANKET BAIR HUGGER ANY SIZE: Performed by: NURSE ANESTHETIST, CERTIFIED REGISTERED

## 2025-04-04 PROCEDURE — 25000003 PHARM REV CODE 250: Performed by: UROLOGY

## 2025-04-04 PROCEDURE — 71000015 HC POSTOP RECOV 1ST HR: Performed by: UROLOGY

## 2025-04-04 PROCEDURE — 27000177 HC AIRWAY, LARYNGEAL MASK: Performed by: NURSE ANESTHETIST, CERTIFIED REGISTERED

## 2025-04-04 PROCEDURE — 96372 THER/PROPH/DIAG INJ SC/IM: CPT | Performed by: UROLOGY

## 2025-04-04 PROCEDURE — 88305 TISSUE EXAM BY PATHOLOGIST: CPT | Mod: TC,91,SUR | Performed by: UROLOGY

## 2025-04-04 PROCEDURE — 36000705 HC OR TIME LEV I EA ADD 15 MIN: Performed by: UROLOGY

## 2025-04-04 PROCEDURE — 36000704 HC OR TIME LEV I 1ST 15 MIN: Performed by: UROLOGY

## 2025-04-04 PROCEDURE — 63600175 PHARM REV CODE 636 W HCPCS: Performed by: UROLOGY

## 2025-04-04 RX ORDER — DEXAMETHASONE SODIUM PHOSPHATE 4 MG/ML
INJECTION, SOLUTION INTRA-ARTICULAR; INTRALESIONAL; INTRAMUSCULAR; INTRAVENOUS; SOFT TISSUE
Status: DISCONTINUED | OUTPATIENT
Start: 2025-04-04 | End: 2025-04-04

## 2025-04-04 RX ORDER — LIDOCAINE HYDROCHLORIDE 20 MG/ML
INJECTION, SOLUTION EPIDURAL; INFILTRATION; INTRACAUDAL; PERINEURAL
Status: DISCONTINUED | OUTPATIENT
Start: 2025-04-04 | End: 2025-04-04

## 2025-04-04 RX ORDER — GENTAMICIN 40 MG/ML
120 INJECTION, SOLUTION INTRAMUSCULAR; INTRAVENOUS ONCE
Status: COMPLETED | OUTPATIENT
Start: 2025-04-04 | End: 2025-04-04

## 2025-04-04 RX ORDER — PROPOFOL 10 MG/ML
VIAL (ML) INTRAVENOUS
Status: DISCONTINUED | OUTPATIENT
Start: 2025-04-04 | End: 2025-04-04

## 2025-04-04 RX ORDER — ONDANSETRON HYDROCHLORIDE 2 MG/ML
INJECTION, SOLUTION INTRAVENOUS
Status: DISCONTINUED | OUTPATIENT
Start: 2025-04-04 | End: 2025-04-04

## 2025-04-04 RX ORDER — IPRATROPIUM BROMIDE AND ALBUTEROL SULFATE 2.5; .5 MG/3ML; MG/3ML
3 SOLUTION RESPIRATORY (INHALATION) ONCE
Status: DISCONTINUED | OUTPATIENT
Start: 2025-04-04 | End: 2025-04-04 | Stop reason: HOSPADM

## 2025-04-04 RX ORDER — HYDROMORPHONE HYDROCHLORIDE 2 MG/ML
0.5 INJECTION, SOLUTION INTRAMUSCULAR; INTRAVENOUS; SUBCUTANEOUS EVERY 5 MIN PRN
Status: DISCONTINUED | OUTPATIENT
Start: 2025-04-04 | End: 2025-04-04 | Stop reason: HOSPADM

## 2025-04-04 RX ORDER — ONDANSETRON HYDROCHLORIDE 2 MG/ML
4 INJECTION, SOLUTION INTRAVENOUS DAILY PRN
Status: DISCONTINUED | OUTPATIENT
Start: 2025-04-04 | End: 2025-04-04 | Stop reason: HOSPADM

## 2025-04-04 RX ORDER — MORPHINE SULFATE 10 MG/ML
4 INJECTION INTRAMUSCULAR; INTRAVENOUS; SUBCUTANEOUS EVERY 5 MIN PRN
Status: DISCONTINUED | OUTPATIENT
Start: 2025-04-04 | End: 2025-04-04 | Stop reason: HOSPADM

## 2025-04-04 RX ORDER — DIPHENHYDRAMINE HYDROCHLORIDE 50 MG/ML
25 INJECTION, SOLUTION INTRAMUSCULAR; INTRAVENOUS EVERY 6 HOURS PRN
Status: DISCONTINUED | OUTPATIENT
Start: 2025-04-04 | End: 2025-04-04 | Stop reason: HOSPADM

## 2025-04-04 RX ORDER — ACETAMINOPHEN 10 MG/ML
1000 INJECTION, SOLUTION INTRAVENOUS ONCE
Status: DISCONTINUED | OUTPATIENT
Start: 2025-04-04 | End: 2025-04-04 | Stop reason: HOSPADM

## 2025-04-04 RX ORDER — ONDANSETRON HYDROCHLORIDE 2 MG/ML
4 INJECTION, SOLUTION INTRAVENOUS EVERY 4 HOURS PRN
Status: DISCONTINUED | OUTPATIENT
Start: 2025-04-04 | End: 2025-04-04 | Stop reason: HOSPADM

## 2025-04-04 RX ORDER — MEPERIDINE HYDROCHLORIDE 25 MG/ML
25 INJECTION INTRAMUSCULAR; INTRAVENOUS; SUBCUTANEOUS EVERY 10 MIN PRN
Status: DISCONTINUED | OUTPATIENT
Start: 2025-04-04 | End: 2025-04-04 | Stop reason: HOSPADM

## 2025-04-04 RX ORDER — MIDAZOLAM HYDROCHLORIDE 1 MG/ML
INJECTION INTRAMUSCULAR; INTRAVENOUS
Status: DISCONTINUED | OUTPATIENT
Start: 2025-04-04 | End: 2025-04-04

## 2025-04-04 RX ORDER — SODIUM CHLORIDE 9 MG/ML
INJECTION, SOLUTION INTRAVENOUS CONTINUOUS
Status: DISCONTINUED | OUTPATIENT
Start: 2025-04-04 | End: 2025-04-04 | Stop reason: HOSPADM

## 2025-04-04 RX ADMIN — DEXAMETHASONE SODIUM PHOSPHATE 4 MG: 4 INJECTION, SOLUTION INTRA-ARTICULAR; INTRALESIONAL; INTRAMUSCULAR; INTRAVENOUS; SOFT TISSUE at 10:04

## 2025-04-04 RX ADMIN — PROPOFOL 200 MG: 10 INJECTION, EMULSION INTRAVENOUS at 10:04

## 2025-04-04 RX ADMIN — SODIUM CHLORIDE: 9 INJECTION, SOLUTION INTRAVENOUS at 10:04

## 2025-04-04 RX ADMIN — GENTAMICIN SULFATE 120 MG: 40 INJECTION, SOLUTION INTRAMUSCULAR; INTRAVENOUS at 07:04

## 2025-04-04 RX ADMIN — MIDAZOLAM HYDROCHLORIDE 2 MG: 1 INJECTION, SOLUTION INTRAMUSCULAR; INTRAVENOUS at 10:04

## 2025-04-04 RX ADMIN — LIDOCAINE HYDROCHLORIDE 100 MG: 20 INJECTION, SOLUTION EPIDURAL; INFILTRATION; INTRACAUDAL; PERINEURAL at 10:04

## 2025-04-04 RX ADMIN — SODIUM CHLORIDE: 9 INJECTION, SOLUTION INTRAVENOUS at 08:04

## 2025-04-04 RX ADMIN — ONDANSETRON 4 MG: 2 INJECTION INTRAMUSCULAR; INTRAVENOUS at 10:04

## 2025-04-04 NOTE — H&P
Patient:  Gary Moscoso  : [birth year]  MRN # [id number]  Date of service:  2025    HPI:  54-year-old black male with an elevated PSA.  We obtained an MRI for PSA of 5-1/2-6.  He has a hip that scattered and interfered with the imaging of the prostate.  We did not get a PI-RADS interpretation.  We did get a volume of 47 g.  I am now seeing him back in follow up and his PSA is 8.1.  Obviously had risen.  He has had no voiding changes.  Says he voids with a good stream with no hesitancy or urgency.  No gross hematuria or urinary tract infections.  His urine is concentrated.  But no hematuria or pyuria.  Nitrate negative.  There was no family history of prostate cancer.    Review of Systems:  General/constitutional:  Patient denies chills, fever, weight loss, fatigue.     HEENT:  Patient denies ear infection, sinus infection, hearing loss, sore throat.     Eyes:  Patient denies blurred vision, eye pain, double vision.     Respiratory:  Patient denies chronic cough, wheezing, dyspnea.     Cardiovascular:  Patient denies chest pain.     Gastrointestinal:  Patient denies blood in stool, abdominal pain, constipation, diarrhea, nausea, vomiting, decreased appetite.     Hematology:  Patient denies bleed easily.     Musculoskeletal:  Patient denies back pain.     Skin:  Patient denies rash.     Neurological:  Patient denies headache.    /70  Pulse 52  Wt 217 lb (98.4 kg)  SpO2 95%  BMI 28.63 kg/m²     Physical Exam  Vitals reviewed.   Constitutional:       Appearance: Normal appearance. He is normal weight.   HENT:      Head: Normocephalic and atraumatic.      Mouth/Throat:      Mouth: Mucous membranes are moist.      Pharynx: Oropharynx is clear.   Cardiovascular:      Rate and Rhythm: Normal rate and regular rhythm.   Pulmonary:      Effort: Pulmonary effort is normal.      Breath sounds: Normal breath sounds.   Abdominal:      General: Abdomen is flat. There is no distension.      Palpations: Abdomen is  soft. There is no mass.      Tenderness: There is no abdominal tenderness. There is no right CVA tenderness, left CVA tenderness, guarding or rebound.      Hernia: No hernia is present.   Genitourinary:     Penis: Normal.       Testes: Normal.      Comments: Rectal shows good tone, no masses.  Prostate is 35-40 g.  No nodularity.  No asymmetry.  Musculoskeletal:      Cervical back: Neck supple.   Neurological:      Mental Status: He is alert.     Assessment/Plan:  Patient with an elevated PSA.  Poor MRI.  His prostate is enlarged but I do not have any nodules or asymmetry.     PSA continues to rise and is 8.1.  I have recommended that we perform a prostate ultrasound.  I explained that we do this as an outpatient under an anesthesia.  I explained we will more than likely take biopsies.  The ultrasound is used to investigate the internal architecture of the prostate.  Unfortunately MRIs best but we just do not have that.     If we biopsy then he will have to be quite for 2 days.  The 1st day he will have an anesthetic and must take it easy.  The hard part is the next day.  Bleeding is the 1. Problem.  He was on meloxicam and he was going to have to hold that for a week prior.  And no other nonsteroidals or aspirin.  He will have some mild blood in his urine and probably the 1st bowel movement.  I answered multiple questions.  We had a long discussion.     Risks, complication, outcomes, sequelae, prognosis explained for a transrectal prostate biopsy.  Patient understood this and agreed to proceed.     Bactrim DS, 7., 1 p.o. the evening before the procedure and the evening of the procedure then twice a day until finished.       Schedule transrectal ultrasound with biopsy as an outpatient under anesthesia.    Problem:  Bph Without Urinary Obstruction     Diagnosis:  Essential hypertension     Elevated PSA     BPH without urinary obstruction     No orders of the defined types were placed in this  encounter.    Electronically signed by Kristofer Puga MD at 2/19/2025  2:24 PM

## 2025-04-04 NOTE — OP NOTE
Preoperative diagnosis: BPH with elevated PSA and nondiagnostic MRI of the prostate.    Postop diagnosis:  Same.      Procedure: Transrectal ultrasound with prostate biopsy.      Surgeon: Kristofer Puga MD.      Anesthesia:  General LMA.    EBL: 2 cc.      Complications: None.      Brief clinical summary:  Patient is a 54-year-old gentleman with an elevated PSA of about 5-1/2-6.  We ordered an MRI of his prostate.  Unfortunately his hip prosthesis is older and he coughs scatter and we were unable to get a good interpretation of his prostate.  They could not give me any type of indication that there was a lesion seen.  So there was no PI-RADS determination.  With this in mind I have recommended a transrectal ultrasound with biopsies.  We will do this as an outpatient under anesthesia.  We discussed this procedure in detail.  Risks, complications, outcomes, sequelae, prognosis and alternative therapy was discussed.  Patient understood this and agreed to proceed.      Technique: Patient is brought to the operative suite and placed on the table in the left lateral decubitus position.  He is given a general LMA anesthetic which he tolerated well.  He is then well padded and positioned for transrectal ultrasound.    The ultrasound probe was well lubricated and inserted in the rectum atraumatically.  The prostate with serially examined.  We performed some measurements and volumetric examination.  The prostate was 4.19 cm in height.  4.99 cm in width and 5.83 cm in length.  Prostate volume was calculated at 63.86 g.  His PSA density was 0.09 which is normal.    Looking with the ultrasound I did not see any high both echoic or hyperechoic lesions.    The prostate was then biopsied with a sextant biopsies.  We began on the right side laterally and then moved to the left side.  We moved lateral to medial on both sides.    After the biopsy was performed the ultrasound was removed.  Perineal pressure was held for 5 minutes.   Patient was then awakened from general anesthesia having tolerated procedure well was sent to the recovery room in stable condition.

## 2025-04-04 NOTE — INTERVAL H&P NOTE
The patient has been examined and the H&P has been reviewed:    I concur with the findings and no changes have occurred since H&P was written.    Surgery risks, benefits and alternative options discussed and understood by patient/family.    There are no hospital problems to display for this patient.    Patient:  Gary Moscoso  : [birth year]  MRN # [id number]  Date of service:  UPDATED:  2025    HPI:  54-year-old black male with an elevated PSA.  We obtained an MRI for his PSA of 5.5 - 6.  He has a hip that scattered and interfered with the imaging of the prostate.  We did not get a PI-RADS interpretation.  We did get a volume of 47 g.  I saw him in follow up and his PSA is now up to 8.1.  I recommended that we perform a prostate ultrasound with possible prostate biospies.  I again answered multiple questions and went over the risks, complication, outcomes, sequelae, prognosis for the transrectal prostate biopsy.      Physical Exam  Constitutional:       Appearance: Normal appearance. He is normal weight.   HENT:      Head: Normocephalic and atraumatic.      Mouth: Mucous membranes are moist.      Pharynx: Oropharynx is clear.   Cardiovascular:      Rate and Rhythm: Normal rate and regular rhythm.   Pulmonary:      Effort: Pulmonary effort is normal.      Breath sounds: Normal breath sounds.   Abdominal:      General: Abdomen is flat. There is no distension.      Palpations: Abdomen is soft. There is no mass.      Tenderness: There is no abdominal tenderness. There is no right CVA tenderness, left CVA tenderness, guarding or rebound.   Musculoskeletal:      Cervical back: Neck supple.   Neurological:      Mental Status: He is alert.     Plan: Transrectal Ultrasound of prostate with possible prostate needle biopsies - under general anesthesia.  Patient understands procedure and agrees to proceed this morning.

## 2025-04-04 NOTE — ANESTHESIA PREPROCEDURE EVALUATION
04/04/2025  Gary Moscoso is a 54 y.o., male.      Pre-op Assessment    I have reviewed the Patient Summary Reports.     I have reviewed the Nursing Notes. I have reviewed the NPO Status.   I have reviewed the Medications.     Review of Systems  Anesthesia Hx:  No problems with previous Anesthesia                Social:  Non-Smoker, No Alcohol Use       Hematology/Oncology:  Hematology Normal   Oncology Normal                                   EENT/Dental:  EENT/Dental Normal           Cardiovascular:     Hypertension  Past MI                                           Pulmonary:  Pulmonary Normal                       Renal/:    BPH              Hepatic/GI:     GERD                Musculoskeletal:  Arthritis   Hx AVN Hip            Neurological:  Neurology Normal                                      Endocrine:  Endocrine Normal            Dermatological:  Skin Normal    Psych:   anxiety                 Physical Exam  General: Well nourished    Airway:  Mallampati: II / II  Mouth Opening: Normal  TM Distance: > 6 cm  Tongue: Normal  Neck ROM: Normal ROM    Chest/Lungs:  Clear to auscultation, Normal Respiratory Rate    Heart:  Rate: Normal  Rhythm: Regular Rhythm        Anesthesia Plan  Type of Anesthesia, risks & benefits discussed:    Anesthesia Type: Gen Supraglottic Airway  Intra-op Monitoring Plan: Standard ASA Monitors  Post Op Pain Control Plan: multimodal analgesia  Induction:  IV  Informed Consent: Informed consent signed with the Patient and all parties understand the risks and agree with anesthesia plan.  All questions answered.   ASA Score: 3  Day of Surgery Review of History & Physical: H&P Update referred to the surgeon/provider.I have interviewed and examined the patient. I have reviewed the patient's H&P dated: There are no significant changes. H&P completed by Anesthesiologist.    Ready  For Surgery From Anesthesia Perspective.     .

## 2025-04-04 NOTE — PROGRESS NOTES
Report given and care released to Sandrita MORALES RN. VSS_ 99% RA, HR 99, resp 14, /81. No active bleeding noted. Wife at bedside. All questions answered. KEVIN

## 2025-04-04 NOTE — DISCHARGE SUMMARY
[facility] - Periop Services  Discharge Note  Short Stay    Procedure(s) (LRB):  BIOPSY, PROSTATE, USING PROSTATE MAPPING (N/A)      OUTCOME: Patient tolerated treatment/procedure well without complication and is now ready for discharge.    DISPOSITION: Home or Self Care    FINAL DIAGNOSIS:  <principal problem not specified>    FOLLOWUP: In clinic    DISCHARGE INSTRUCTIONS:  No discharge procedures on file.      Clinical Reference Documents Added to Patient Instructions         Document    [facility] WORK EXCUSE - ACCOMPANY A PATIENT    PROSTATE BIOPSY DISCHARGE INSTRUCTIONS (ENGLISH)            TIME SPENT ON DISCHARGE: 5 minutes

## 2025-04-04 NOTE — ANESTHESIA PROCEDURE NOTES
Intubation    Date/Time: 4/4/2025 10:17 AM    Performed by: Yung Garcia CRNA  Authorized by: Danyel Mario MD    Intubation:     Induction:  Intravenous    Intubated:  Postinduction    Mask Ventilation:  Not attempted    Attempts:  1    Attempted By:  CRNA    Difficult Airway Encountered?: No      Complications:  None    Airway Device:  Supraglottic airway/LMA    Airway Device Size:  4.0    Style/Cuff Inflation:  Cuffed (inflated to minimal occlusive pressure)    Placement Verified By:  Capnometry    Complicating Factors:  None    Findings Post-Intubation:  BS equal bilateral and atraumatic/condition of teeth unchanged

## 2025-04-04 NOTE — TRANSFER OF CARE
"Anesthesia Transfer of Care Note    Patient: Gary Moscoso    Procedure(s) Performed: Procedure(s) (LRB):  BIOPSY, PROSTATE, USING PROSTATE MAPPING (N/A)    Patient location: PACU    Anesthesia Type: general    Transport from OR: Transported from OR on 100% O2 by closed face mask with adequate spontaneous ventilation    Post pain: adequate analgesia    Post assessment: no apparent anesthetic complications    Post vital signs: stable    Level of consciousness: responds to stimulation    Nausea/Vomiting: no nausea/vomiting    Complications: none    Transfer of care protocol was followed      Last vitals: Visit Vitals  /74   Pulse 63   Temp 36.4 °C (97.6 °F) (Oral)   Resp 14   Ht 6' 4" (1.93 m)   Wt 93.4 kg (206 lb)   SpO2 100%   BMI 25.08 kg/m²     "

## 2025-04-05 NOTE — ANESTHESIA POSTPROCEDURE EVALUATION
Anesthesia Post Evaluation    Patient: Gary Moscoso    Procedure(s) Performed: Procedure(s) (LRB):  BIOPSY, PROSTATE, USING PROSTATE MAPPING (N/A)    Final Anesthesia Type: general      Patient location during evaluation: PACU  Patient participation: Yes- Able to Participate  Level of consciousness: awake and alert  Post-procedure vital signs: reviewed and stable  Pain management: adequate  Airway patency: patent  SMITH mitigation strategies: Multimodal analgesia  PONV status at discharge: No PONV  Anesthetic complications: no      Cardiovascular status: blood pressure returned to baseline  Respiratory status: unassisted  Hydration status: euvolemic  Follow-up not needed.              Vitals Value Taken Time   /68 04/04/25 11:46   Temp 36.4 °C (97.6 °F) 04/04/25 10:44   Pulse 49 04/04/25 12:00   Resp 12 04/04/25 11:10   SpO2 98 % 04/04/25 12:00         Event Time   Out of Recovery 11:10:00         Pain/Haley Score: Haley Score: 10 (4/4/2025 12:14 PM)  Modified Haley Score: 20 (4/4/2025 12:14 PM)

## 2025-04-22 ENCOUNTER — CLINICAL SUPPORT (OUTPATIENT)
Dept: PRIMARY CARE CLINIC | Facility: CLINIC | Age: 55
End: 2025-04-22

## 2025-04-22 DIAGNOSIS — Z02.4 ENCOUNTER FOR DEPARTMENT OF TRANSPORTATION (DOT) EXAMINATION FOR DRIVING LICENSE RENEWAL: Primary | ICD-10-CM

## 2025-04-22 NOTE — PROGRESS NOTES
Patient ID: Gary Moscoso is a 54 y.o. male.    Chief Complaint: No chief complaint on file.    History of Present Illness              Physical Exam              Assessment & Plan               1. Encounter for Department of Transportation (DOT) examination for driving license renewal        No follow-ups on file.    This note was generated with the assistance of ambient listening technology. Verbal consent was obtained by the patient and accompanying visitor(s) for the recording of patient appointment to facilitate this note. I attest to having reviewed and edited the generated note for accuracy, though some syntax or spelling errors may persist. Please contact the author of this note for any clarification.

## 2025-05-14 ENCOUNTER — HOSPITAL ENCOUNTER (OUTPATIENT)
Dept: RADIOLOGY | Facility: HOSPITAL | Age: 55
Discharge: HOME | End: 2025-05-14
Attending: ANESTHESIOLOGY
Payer: MEDICARE

## 2025-05-14 DIAGNOSIS — M54.16 LUMBAR RADICULOPATHY: ICD-10-CM

## 2025-05-14 PROCEDURE — 72148 MRI LUMBAR SPINE W/O DYE: CPT | Mod: TC

## 2025-05-14 PROCEDURE — 72148 MRI LUMBAR SPINE W/O DYE: CPT | Mod: 26,,, | Performed by: RADIOLOGY

## 2025-05-20 ENCOUNTER — OFFICE VISIT (OUTPATIENT)
Dept: FAMILY MEDICINE | Facility: CLINIC | Age: 55
End: 2025-05-20
Payer: MEDICARE

## 2025-05-20 VITALS
SYSTOLIC BLOOD PRESSURE: 110 MMHG | OXYGEN SATURATION: 97 % | RESPIRATION RATE: 18 BRPM | HEART RATE: 63 BPM | TEMPERATURE: 98 F | BODY MASS INDEX: 25.6 KG/M2 | DIASTOLIC BLOOD PRESSURE: 78 MMHG | HEIGHT: 76 IN | WEIGHT: 210.19 LBS

## 2025-05-20 DIAGNOSIS — J01.90 ACUTE BACTERIAL SINUSITIS: ICD-10-CM

## 2025-05-20 DIAGNOSIS — J01.41 ACUTE RECURRENT PANSINUSITIS: Primary | ICD-10-CM

## 2025-05-20 DIAGNOSIS — I10 ESSENTIAL HYPERTENSION: ICD-10-CM

## 2025-05-20 DIAGNOSIS — B96.89 ACUTE BACTERIAL SINUSITIS: ICD-10-CM

## 2025-05-20 DIAGNOSIS — Z13.1 SCREENING FOR DIABETES MELLITUS: ICD-10-CM

## 2025-05-20 DIAGNOSIS — Z13.220 SCREENING FOR LIPOID DISORDERS: ICD-10-CM

## 2025-05-20 DIAGNOSIS — R97.20 ELEVATED PSA: ICD-10-CM

## 2025-05-20 PROCEDURE — 1160F RVW MEDS BY RX/DR IN RCRD: CPT | Mod: ,,, | Performed by: NURSE PRACTITIONER

## 2025-05-20 PROCEDURE — 3078F DIAST BP <80 MM HG: CPT | Mod: ,,, | Performed by: NURSE PRACTITIONER

## 2025-05-20 PROCEDURE — 3044F HG A1C LEVEL LT 7.0%: CPT | Mod: ,,, | Performed by: NURSE PRACTITIONER

## 2025-05-20 PROCEDURE — 96372 THER/PROPH/DIAG INJ SC/IM: CPT | Mod: ,,, | Performed by: NURSE PRACTITIONER

## 2025-05-20 PROCEDURE — 3008F BODY MASS INDEX DOCD: CPT | Mod: ,,, | Performed by: NURSE PRACTITIONER

## 2025-05-20 PROCEDURE — 99213 OFFICE O/P EST LOW 20 MIN: CPT | Mod: 25,,, | Performed by: NURSE PRACTITIONER

## 2025-05-20 PROCEDURE — 3074F SYST BP LT 130 MM HG: CPT | Mod: ,,, | Performed by: NURSE PRACTITIONER

## 2025-05-20 PROCEDURE — 1159F MED LIST DOCD IN RCRD: CPT | Mod: ,,, | Performed by: NURSE PRACTITIONER

## 2025-05-20 RX ORDER — CEFTRIAXONE 1 G/1
1 INJECTION, POWDER, FOR SOLUTION INTRAMUSCULAR; INTRAVENOUS
Status: COMPLETED | OUTPATIENT
Start: 2025-05-20 | End: 2025-05-20

## 2025-05-20 RX ORDER — HYDROCODONE BITARTRATE AND ACETAMINOPHEN 5; 325 MG/1; MG/1
1 TABLET ORAL EVERY 12 HOURS
COMMUNITY
Start: 2025-05-13

## 2025-05-20 RX ORDER — CEFDINIR 300 MG/1
300 CAPSULE ORAL 2 TIMES DAILY
Qty: 20 CAPSULE | Refills: 0 | Status: SHIPPED | OUTPATIENT
Start: 2025-05-20 | End: 2025-05-30

## 2025-05-20 RX ORDER — CELECOXIB 200 MG/1
200 CAPSULE ORAL DAILY PRN
COMMUNITY
Start: 2025-05-13

## 2025-05-20 RX ADMIN — CEFTRIAXONE 1 G: 1 INJECTION, POWDER, FOR SOLUTION INTRAMUSCULAR; INTRAVENOUS at 10:05

## 2025-05-22 ENCOUNTER — RESULTS FOLLOW-UP (OUTPATIENT)
Dept: FAMILY MEDICINE | Facility: CLINIC | Age: 55
End: 2025-05-22

## 2025-05-22 DIAGNOSIS — R73.03 PREDIABETES: Primary | ICD-10-CM

## 2025-05-23 ENCOUNTER — TELEPHONE (OUTPATIENT)
Dept: FAMILY MEDICINE | Facility: CLINIC | Age: 55
End: 2025-05-23
Payer: MEDICARE

## 2025-05-23 NOTE — PROGRESS NOTES
Cholesterol looks good. Continue low fat/low cholesterol diet with increased exercise as tolerated. Hgb A1C 5.9 which is considered pre diabetic. Work on low carb/ no concentrated sweets diet. Increase water intake. Will continue to monitor levels.

## 2025-05-23 NOTE — TELEPHONE ENCOUNTER
----- Message from Brit Obando NP sent at 5/22/2025  9:19 PM CDT -----  Cholesterol looks good. Continue low fat/low cholesterol diet with increased exercise as tolerated. Hgb A1C 5.9 which is considered pre diabetic. Work on low carb/ no concentrated sweets diet.   Increase water intake. Will continue to monitor levels.   ----- Message -----  From: Lab, Background User  Sent: 5/22/2025   8:53 PM CDT  To: Brit Obando NP

## 2025-07-09 ENCOUNTER — TELEPHONE (OUTPATIENT)
Dept: FAMILY MEDICINE | Facility: CLINIC | Age: 55
End: 2025-07-09
Payer: MEDICARE

## 2025-07-09 NOTE — TELEPHONE ENCOUNTER
Copied from CRM #[id number]. Topic: Appointments - Appointment Scheduling  >> Jul 9, 2025  1:32 PM Janelle wrote:  Who Called: Gary Sergei Danis    Caller is requesting a sooner appointment. Caller declined first available appointment listed below. Caller will not accept being placed on the waitlist and is requesting a message be sent to doctor.    When is the first available appointment?  Options offered (Virtual Visit, Urgent Care):   Symptoms: surgical clearance      Preferred Method of Contact: Phone Call  Patient's Preferred Phone Number on File: [phone]   Best Call Back Number, if different:[phone]  Additional Information: clinic requesting appointment be around July 28th.    Attempted to call patient, no answer, left message,  need to know when surgery is to schedule appt. Do not have anything before 08/08/2025

## 2025-07-23 ENCOUNTER — HOSPITAL ENCOUNTER (EMERGENCY)
Facility: HOSPITAL | Age: 55
Discharge: HOME | End: 2025-07-23

## 2025-07-23 VITALS
RESPIRATION RATE: 17 BRPM | HEART RATE: 60 BPM | WEIGHT: 211 LBS | DIASTOLIC BLOOD PRESSURE: 88 MMHG | SYSTOLIC BLOOD PRESSURE: 152 MMHG | TEMPERATURE: 98 F | BODY MASS INDEX: 25.69 KG/M2 | HEIGHT: 76 IN | OXYGEN SATURATION: 99 %

## 2025-07-23 DIAGNOSIS — S92.424A CLOSED NONDISPLACED FRACTURE OF DISTAL PHALANX OF RIGHT GREAT TOE, INITIAL ENCOUNTER: Primary | ICD-10-CM

## 2025-07-23 PROCEDURE — 99283 EMERGENCY DEPT VISIT LOW MDM: CPT | Mod: 25

## 2025-07-23 NOTE — Clinical Note
"Gary Hall" Danis was seen and treated in our emergency department on 7/23/2025.  He may return to work on 07/25/2025.       If you have any questions or concerns, please don't hesitate to call.      Gurvinder Stewart, NP"

## 2025-07-24 ENCOUNTER — TELEPHONE (OUTPATIENT)
Dept: ORTHOPEDICS | Facility: CLINIC | Age: 55
End: 2025-07-24
Payer: MEDICARE

## 2025-07-24 NOTE — ED PROVIDER NOTES
Encounter Date: 7/23/2025       History     Chief Complaint   Patient presents with    Foot Injury     Pov to er - states tub was dropped on R toe - went to [facility] and xray shows fx - sent here      54-year-old male presents to the emergency department for evaluation of right great toe injury.  Reports that a iron tub fell on his right great toe when they removed it.  States that he was seen at a clinic in [facility] and diagnosed with a toe fracture and told to come to the ED for a splint.  Denies numbness/tingling/loss of motor function to right great toe.    The history is provided by the patient. No  was used.   Foot Injury   The incident occurred at home. The injury mechanism was a direct blow. The incident occurred several hours ago. The pain is present in the right toes. The quality of the pain is described as aching. The pain is at a severity of 6/10. The pain has been Improving since onset. Associated symptoms include inability to bear weight. Pertinent negatives include no numbness, no loss of motion, no muscle weakness, no loss of sensation and no tingling. He reports no foreign bodies present. The symptoms are aggravated by activity. He has tried nothing for the symptoms.     Review of patient's allergies indicates:   Allergen Reactions    Ace inhibitors Swelling    Lisinopril Anaphylaxis    Opioids - morphine analogues Nausea And Vomiting    Sulfa (sulfonamide antibiotics) Anaphylaxis, Itching and Rash    Codeine Nausea And Vomiting    Mobic [meloxicam] Itching     Past Medical History:   Diagnosis Date    Aseptic necrosis of head of humerus 09/27/2013    BPH (benign prostatic hyperplasia)     Dr. Kristofer Puga    Cough 02/14/2024    Cyst of epididymis 02/18/2021    Degeneration of lumbar intervertebral disc 03/24/2013    Depression     Encounter for examination for driving license 08/06/2018    GERD (gastroesophageal reflux disease)     Hypertension     Hypokalemia     Lateral  epicondylitis, left elbow 06/01/2020    Myocardial infarction 2020    pt reports    Non-diabetic hypoglycemia 01/10/2012    Presence of right artificial hip joint 02/04/2016    Prolapsed cervical intervertebral disc 05/09/2013    without myelopathy     Past Surgical History:   Procedure Laterality Date    ARTHROSCOPY, HIP Left 9/20/2021    Procedure: ARTHROSCOPY, HIP, WITH LABRUM REPAIR;  Surgeon: Charles Yeh MD;  Location: [facility] OR;  Service: Orthopedics;  Laterality: Left;    ARTHROSCOPY, HIP Left 9/20/2021    Procedure: ARTHROSCOPY, HIP, WITH FEMOROPLASTY;  Surgeon: Charles Yeh MD;  Location: [facility] OR;  Service: Orthopedics;  Laterality: Left;    ARTHROSCOPY, HIP Left 9/20/2021    Procedure: ARTHROSCOPY, HIP, WITH ACETABULOPLASTY, WITH REPAIR OF LABRUM IF INDICATED;  Surgeon: Charles Yeh MD;  Location: [facility] OR;  Service: Orthopedics;  Laterality: Left;    BIOPSY, PROSTATE, USING PROSTATE MAPPING N/A 10/5/2023    Procedure: BIOPSY, PROSTATE, USING PROSTATE MAPPING;  Surgeon: Kristofer Puga MD;  Location: [facility] OR;  Service: Urology;  Laterality: N/A;    BIOPSY, PROSTATE, USING PROSTATE MAPPING N/A 4/4/2025    Procedure: BIOPSY, PROSTATE, USING PROSTATE MAPPING;  Surgeon: Kristofer Puga MD;  Location: [facility];  Service: Urology;  Laterality: N/A;    CARPAL TUNNEL RELEASE      DIAGNOSTIC LAPAROSCOPY N/A 3/15/2021    Procedure: LAPAROSCOPY, DIAGNOSTIC;  Surgeon: Jay Noeln MD;  Location: [facility];  Service: General;  Laterality: N/A;  1052 FAMILY INFORMED OF SURGERY START  1130 DR NOLEN TALKED TO PATIENT FAMILY    HIP SURGERY      inguinal hernia reparir  03/05/2021    Dr. Robert Nolen    ROBOT-ASSISTED LAPAROSCOPIC REPAIR OF INGUINAL HERNIA USING DA JALEN XI Bilateral 3/15/2021    Procedure: XI ROBOTIC INGUINAL HERNIA REPAIRS WITH MESH;  Surgeon: Jay Nolen MD;  Location: [facility];  Service: General;  Laterality: Bilateral;    SHOULDER SURGERY       VARICOSE VEIN SURGERY       Family History   Problem Relation Name Age of Onset    Colon cancer Mother      Hypertension Mother      Diabetes Mellitus Father      Heart disease Father      Hypertension Father      Heart attack Sister      Cancer Sister      Heart attack Brother      No Known Problems Daughter      No Known Problems Daughter      Cancer Maternal Grandmother      Heart disease Maternal Grandfather      Heart disease Paternal Grandmother      Diabetes Paternal Grandmother      Hypertension Paternal Grandmother      Cancer Paternal Grandfather       Social History[1]  Review of Systems   Constitutional:  Negative for activity change, appetite change, chills and fever.   Eyes:  Negative for photophobia, pain and visual disturbance.   Genitourinary:  Negative for decreased urine volume, difficulty urinating and dysuria.   Musculoskeletal:  Negative for arthralgias and joint swelling.   Neurological:  Negative for tingling, tremors, weakness and numbness.   Psychiatric/Behavioral:  Negative for confusion.    All other systems reviewed and are negative.      Physical Exam     Initial Vitals [07/23/25 2159]   BP Pulse Resp Temp SpO2   (!) 152/88 60 17 97.5 °F (36.4 °C) 99 %      MAP       --         Physical Exam    Vitals reviewed.  Constitutional: No distress.   HENT:   Head: Normocephalic.   Eyes: Conjunctivae are normal. Right eye exhibits no discharge. Left eye exhibits no discharge.   Neck: Neck supple.   Normal range of motion.  Cardiovascular:  Normal rate, regular rhythm and normal heart sounds.           Pulses:       Dorsalis pedis pulses are 2+ on the right side.        Posterior tibial pulses are 2+ on the right side.   Pulmonary/Chest: Breath sounds normal. No respiratory distress. He has no wheezes. He has no rhonchi. He has no rales. He exhibits no tenderness.   Abdominal: Abdomen is soft. Bowel sounds are normal. He exhibits no distension and no mass. There is no abdominal tenderness. There  is no rebound and no guarding.   Musculoskeletal:         General: Normal range of motion.      Cervical back: Normal range of motion and neck supple.      Right foot: Normal capillary refill. Swelling (right great toe) present. Normal pulse.     Lymphadenopathy:     He has no cervical adenopathy.   Neurological: He is alert and oriented to person, place, and time. He has normal strength. No sensory deficit. GCS score is 15. GCS eye subscore is 4. GCS verbal subscore is 5. GCS motor subscore is 6.   Skin: Skin is warm and dry. Capillary refill takes less than 2 seconds.   Psychiatric: He has a normal mood and affect. His behavior is normal.         Medical Screening Exam   See Full Note    ED Course   Procedures  Labs Reviewed - No data to display       Imaging Results              X-Ray Toe 2 or More Views Right (In process)                      Medications - No data to display  Medical Decision Making  54-year-old male presents to the emergency department for evaluation of right great toe injury.  Reports that a iron tub fell on his right great toe when they removed it.  States that he was seen at a clinic in [facility] and diagnosed with a toe fracture and told to come to the ED for a splint.  Denies numbness/tingling/loss of motor function to right great toe.  X-ray ordered and reviewed.  Walking boot applied   Follow up and return precautions discussed with patient, who verbalized understanding.  Referral given to Orthopedics  Diagnosis: Closed nondisplaced fracture of distal phalanx of right great toe    Amount and/or Complexity of Data Reviewed  Radiology: ordered.                                      Clinical Impression:   Final diagnoses:  [S92.424A] Closed nondisplaced fracture of distal phalanx of right great toe, initial encounter (Primary)        ED Disposition Condition    Discharge Stable          ED Prescriptions    None       Follow-up Information    None            [1]   Social History  Tobacco Use     Smoking status: Never     Passive exposure: Never    Smokeless tobacco: Never   Substance Use Topics    Alcohol use: Not Currently    Drug use: Not Currently        Gurvinder Stewart NP  07/23/25 1993

## 2025-07-24 NOTE — DISCHARGE INSTRUCTIONS
Follow up with Orthopedics, they will call you with an appointment date/time.  Follow up with primary care provider in the morning for an appointment for re-evaluation.  Return to the emergency department for new or worsening symptoms.

## 2025-07-31 ENCOUNTER — EXTERNAL CHRONIC CARE MANAGEMENT (OUTPATIENT)
Dept: FAMILY MEDICINE | Facility: CLINIC | Age: 55
End: 2025-07-31

## 2025-07-31 PROCEDURE — 99490 CHRNC CARE MGMT STAFF 1ST 20: CPT | Mod: ,,, | Performed by: NURSE PRACTITIONER

## 2025-09-02 ENCOUNTER — OFFICE VISIT (OUTPATIENT)
Dept: FAMILY MEDICINE | Facility: CLINIC | Age: 55
End: 2025-09-02
Payer: MEDICARE

## 2025-09-02 VITALS
RESPIRATION RATE: 16 BRPM | TEMPERATURE: 98 F | OXYGEN SATURATION: 98 % | DIASTOLIC BLOOD PRESSURE: 85 MMHG | HEIGHT: 76 IN | SYSTOLIC BLOOD PRESSURE: 126 MMHG | WEIGHT: 210.19 LBS | HEART RATE: 57 BPM | BODY MASS INDEX: 25.6 KG/M2

## 2025-09-02 DIAGNOSIS — J01.90 ACUTE BACTERIAL SINUSITIS: Primary | ICD-10-CM

## 2025-09-02 DIAGNOSIS — K21.9 GASTROESOPHAGEAL REFLUX DISEASE WITHOUT ESOPHAGITIS: ICD-10-CM

## 2025-09-02 DIAGNOSIS — B96.89 ACUTE BACTERIAL SINUSITIS: Primary | ICD-10-CM

## 2025-09-02 PROCEDURE — 96372 THER/PROPH/DIAG INJ SC/IM: CPT | Mod: ,,, | Performed by: NURSE PRACTITIONER

## 2025-09-02 PROCEDURE — 99213 OFFICE O/P EST LOW 20 MIN: CPT | Mod: 25,,, | Performed by: NURSE PRACTITIONER

## 2025-09-02 PROCEDURE — 3008F BODY MASS INDEX DOCD: CPT | Mod: ,,, | Performed by: NURSE PRACTITIONER

## 2025-09-02 PROCEDURE — 3044F HG A1C LEVEL LT 7.0%: CPT | Mod: ,,, | Performed by: NURSE PRACTITIONER

## 2025-09-02 PROCEDURE — 3074F SYST BP LT 130 MM HG: CPT | Mod: ,,, | Performed by: NURSE PRACTITIONER

## 2025-09-02 PROCEDURE — 1160F RVW MEDS BY RX/DR IN RCRD: CPT | Mod: ,,, | Performed by: NURSE PRACTITIONER

## 2025-09-02 PROCEDURE — 1159F MED LIST DOCD IN RCRD: CPT | Mod: ,,, | Performed by: NURSE PRACTITIONER

## 2025-09-02 PROCEDURE — 3079F DIAST BP 80-89 MM HG: CPT | Mod: ,,, | Performed by: NURSE PRACTITIONER

## 2025-09-02 RX ORDER — ASPIRIN 81 MG/1
81 TABLET ORAL DAILY
COMMUNITY

## 2025-09-02 RX ORDER — CEFTRIAXONE 1 G/1
1 INJECTION, POWDER, FOR SOLUTION INTRAMUSCULAR; INTRAVENOUS
Status: COMPLETED | OUTPATIENT
Start: 2025-09-02 | End: 2025-09-02

## 2025-09-02 RX ORDER — AMOXICILLIN AND CLAVULANATE POTASSIUM 875; 125 MG/1; MG/1
1 TABLET, FILM COATED ORAL EVERY 12 HOURS
Qty: 20 TABLET | Refills: 0 | Status: SHIPPED | OUTPATIENT
Start: 2025-09-02

## 2025-09-02 RX ORDER — POTASSIUM CHLORIDE 1500 MG/1
20 TABLET, EXTENDED RELEASE ORAL 2 TIMES DAILY
COMMUNITY
Start: 2025-07-28

## 2025-09-02 RX ORDER — OMEPRAZOLE 20 MG/1
20 CAPSULE, DELAYED RELEASE ORAL DAILY
Qty: 90 CAPSULE | Refills: 3 | Status: SHIPPED | OUTPATIENT
Start: 2025-09-02 | End: 2026-09-02

## 2025-09-02 RX ORDER — TRAMADOL HYDROCHLORIDE 50 MG/1
50 TABLET, FILM COATED ORAL EVERY 6 HOURS PRN
COMMUNITY

## 2025-09-02 RX ADMIN — CEFTRIAXONE 1 G: 1 INJECTION, POWDER, FOR SOLUTION INTRAMUSCULAR; INTRAVENOUS at 09:09

## (undated) DEVICE — GLOVE SURGICAL PROTEXIS PI SIZE 6.5

## (undated) DEVICE — Device

## (undated) DEVICE — SCISSORS MONOPOLAR CURVED ENDOWRIST DAVINCI XI 10US/EA

## (undated) DEVICE — GLOVE SURGICAL PROTEXIS PI CLASSIC SIZE 7.0

## (undated) DEVICE — COVER MAYO STAND W/PAD 23X54IN

## (undated) DEVICE — CLOSURE WOUND STERI STRIP REINFORCED 0.5 X 2IN

## (undated) DEVICE — MINITAPE BLUE 39.5

## (undated) DEVICE — GOWN SURGICAL SMARTGOWN LEVEL 4 / EXTRA LARGE STERILE

## (undated) DEVICE — WAND COBLATION WEREWOLF FLOW 50

## (undated) DEVICE — SOL IRRIGATION SALINE 0.9% 1000ML BOTTLE

## (undated) DEVICE — WAND COBLATION AMBIENT HIPVAC 50 IF S

## (undated) DEVICE — GLOVE SURGICAL PROTEXIS PI BLUE SIZE 6.5

## (undated) DEVICE — GLOVE SURGICAL PROTEXIS PI SIZE 7

## (undated) DEVICE — SUTURE FIBERWIRE #2 BRAIDED BX/12

## (undated) DEVICE — GLOVE SURGICAL PROTEXIS PI CLASSIC SIZE 6.5

## (undated) DEVICE — GOWN SURGICAL IMPERVIOUS LEVEL 4 / 2X-LARGE STERILE

## (undated) DEVICE — BLADE INCISOR PLUS ELITE LONG 4.5MM

## (undated) DEVICE — GLOVE SURGICAL PROTEXIS PI BLUE SIZE 7.0

## (undated) DEVICE — CDS LAP CHOLE

## (undated) DEVICE — WARMER SCOPE DISP

## (undated) DEVICE — NEEDLE DRIVER SUTURE CUT MEGA ENDOWRIST DAVINCI XI 10US/EA

## (undated) DEVICE — TRAY FOLEY CATH 16FR SILICONE LUBRI-SIL W/DRAINAGE BAG

## (undated) DEVICE — NEEDLE GUIDE ENDOCAVITY

## (undated) DEVICE — GOWN SURGICAL STERILE LEVEL 3 / XX-LARGE

## (undated) DEVICE — DRESSING TELFA MINI ISLAND LF NONADH

## (undated) DEVICE — FILM IOBAN ANTIMICROBL 60X60CM

## (undated) DEVICE — SPONGE GAUZE 4X4 12 PLY STL AMD 10/TRAY

## (undated) DEVICE — GLOVE SURGICAL PROTEXIS PI BLUE SIZE 8.5

## (undated) DEVICE — GLOVE SURGICAL PROTEXIS PI CLASSIC SIZE 8.0

## (undated) DEVICE — WATER BOOM FLOOR SUCTION STRIP

## (undated) DEVICE — DRAPE FLUORO C ARM 36X30IN

## (undated) DEVICE — APPLICATOR CHLORAPREP HI-LITE TINTED ORANGE 26ML

## (undated) DEVICE — SUTURE STRATFIX 14CM X 14CM SH 2-0

## (undated) DEVICE — CDS KNEE ARTHROSCOPY

## (undated) DEVICE — STRIP CLOSURE SKIN 1/2 X 4 IN

## (undated) DEVICE — INSERT CARTRIDGE 5X34CM DISP F/SCISSOR

## (undated) DEVICE — DAVINCI 5 X 8 UNIVERSAL SEAL

## (undated) DEVICE — TUBING ARTHRO STRYKER

## (undated) DEVICE — NDL MAXCORE BIOPSY 18G 25CM

## (undated) DEVICE — ACCESSORY TIP COVER

## (undated) DEVICE — PAD PERINEAL SUPINE

## (undated) DEVICE — COVER LIGHT HANDLE FLEX PK/2

## (undated) DEVICE — TUBING SUCTION 5MM STERILE 3/16IN X 12FT

## (undated) DEVICE — SOL IRRIGATION SALINE 3000ML BAG

## (undated) DEVICE — SUTURE MONOCRYL 4-0 27IN

## (undated) DEVICE — GLOVE SURGICAL PROTEXIS PI CLASSIC SIZE 7.5

## (undated) DEVICE — DRAPE STERI ISOLATION IOBAN 2

## (undated) DEVICE — DAVINICI OPTICAL 8MM BLADLESS OBTURATOR

## (undated) DEVICE — ADHESIVE LIQUID MASTISOL 2/3CC

## (undated) DEVICE — CANISTER SUCTION 12000ML DISPOSABLE

## (undated) DEVICE — DAVINCI ARM DRAPE

## (undated) DEVICE — DRESSING XEROFORM 5X9

## (undated) DEVICE — TROCAR BLADELESS Z-THREAD 5MM X 100MM